# Patient Record
Sex: MALE | Race: WHITE | NOT HISPANIC OR LATINO | Employment: OTHER
[De-identification: names, ages, dates, MRNs, and addresses within clinical notes are randomized per-mention and may not be internally consistent; named-entity substitution may affect disease eponyms.]

---

## 2017-01-04 ENCOUNTER — CHARTING TRANS (OUTPATIENT)
Dept: OTHER | Age: 62
End: 2017-01-04

## 2017-03-01 ENCOUNTER — CHARTING TRANS (OUTPATIENT)
Dept: OTHER | Age: 62
End: 2017-03-01

## 2017-03-06 ENCOUNTER — CHARTING TRANS (OUTPATIENT)
Dept: OTHER | Age: 62
End: 2017-03-06

## 2017-03-07 ENCOUNTER — LAB SERVICES (OUTPATIENT)
Dept: OTHER | Age: 62
End: 2017-03-07

## 2017-03-07 ENCOUNTER — CHARTING TRANS (OUTPATIENT)
Dept: OTHER | Age: 62
End: 2017-03-07

## 2017-03-07 ENCOUNTER — CHARTING TRANS (OUTPATIENT)
Dept: INTERNAL MEDICINE | Age: 62
End: 2017-03-07

## 2017-03-07 LAB
ALBUMIN SERPL BCG-MCNC: 4 G/DL (ref 3.6–5.1)
ALBUMIN/CREAT UR: 5.6 MG/G (ref 0–30)
ALP SERPL-CCNC: 88 U/L (ref 30–130)
ALT SERPL W/O P-5'-P-CCNC: 33 U/L (ref 17–47)
AST SERPL-CCNC: 17 U/L (ref 14–43)
BASOPHIL %: 0.8 % (ref 0–1.2)
BASOPHIL ABSOLUTE #: 0.1 10*3/UL (ref 0–0.1)
BILIRUB SERPL-MCNC: 0.5 MG/DL (ref 0–1.3)
BUN SERPL-MCNC: 20 MG/DL (ref 6–27)
CALCIUM SERPL-MCNC: 9.6 MG/DL (ref 8.6–10.6)
CHLORIDE SERPL-SCNC: 100 MMOL/L (ref 96–107)
CHOLEST SERPL-MCNC: 165 MG/DL (ref 140–200)
CREAT UR-MCNC: 111 MG/DL
CREATININE, SERUM: 0.8 MG/DL (ref 0.6–1.6)
DIFFERENTIAL TYPE: NORMAL
EOSINOPHIL %: 4.8 % (ref 0–10)
EOSINOPHIL ABSOLUTE #: 0.4 10*3/UL (ref 0–0.5)
EST. AVERAGE GLUCOSE BLD GHB EST-MCNC: 171 MG/DL (ref 0–154)
GFR SERPL CREATININE-BSD FRML MDRD: >60 ML/MIN/{1.73M2}
GFR SERPL CREATININE-BSD FRML MDRD: >60 ML/MIN/{1.73M2}
GLUCOSE P FAST SERPL-MCNC: 240 MG/DL (ref 60–100)
HBA1C MFR BLD: 7.6 % (ref 4.2–6)
HCO3 SERPL-SCNC: 27 MMOL/L (ref 22–32)
HDLC SERPL-MCNC: 63 MG/DL
HEMATOCRIT: 40.9 % (ref 40–51)
HEMOGLOBIN: 13.9 G/DL (ref 13.7–17.5)
LDLC SERPL CALC-MCNC: 87 MG/DL (ref 30–100)
LYMPH PERCENT: 31.3 % (ref 20.5–51.1)
LYMPHOCYTE ABSOLUTE #: 2.3 10*3/UL (ref 1.2–3.4)
MEAN CORPUSCULAR HGB CONCENTRATION: 34 % (ref 32–36)
MEAN CORPUSCULAR HGB: 31.2 PG (ref 27–34)
MEAN CORPUSCULAR VOLUME: 91.9 FL (ref 79–95)
MEAN PLATELET VOLUME: 10.1 FL (ref 8.6–12.4)
MICROALBUMIN UR-MCNC: 6.2 MG/L
MONOCYTE ABSOLUTE #: 0.8 10*3/UL (ref 0.2–0.9)
MONOCYTE PERCENT: 11 % (ref 4.3–12.9)
NEUTROPHIL ABSOLUTE #: 3.8 10*3/UL (ref 1.4–6.5)
NEUTROPHIL PERCENT: 52.1 % (ref 34–73.5)
PLATELET COUNT: 163 10*3/UL (ref 150–400)
POTASSIUM SERPL-SCNC: 4.6 MMOL/L (ref 3.5–5.3)
PROT SERPL-MCNC: 7 G/DL (ref 6.4–8.5)
PSA SERPL-MCNC: 0.48 NG/ML (ref 0–4.1)
RED BLOOD CELL COUNT: 4.45 10*6/UL (ref 3.9–5.7)
RED CELL DISTRIBUTION WIDTH: 11.9 % (ref 11.3–14.8)
SODIUM SERPL-SCNC: 138 MMOL/L (ref 136–146)
TRIGL SERPL-MCNC: 75 MG/DL (ref 0–200)
TSH SERPL-ACNC: 5.32 M[IU]/L (ref 0.3–4.82)
WHITE BLOOD CELL COUNT: 7.4 10*3/UL (ref 4–10)

## 2017-04-01 ENCOUNTER — CHARTING TRANS (OUTPATIENT)
Dept: OTHER | Age: 62
End: 2017-04-01

## 2017-05-03 ENCOUNTER — CHARTING TRANS (OUTPATIENT)
Dept: OTHER | Age: 62
End: 2017-05-03

## 2017-06-02 ENCOUNTER — CHARTING TRANS (OUTPATIENT)
Dept: OTHER | Age: 62
End: 2017-06-02

## 2017-06-15 ENCOUNTER — LAB SERVICES (OUTPATIENT)
Dept: OTHER | Age: 62
End: 2017-06-15

## 2017-06-15 LAB — TSH SERPL-ACNC: 8.32 M[IU]/L (ref 0.3–4.82)

## 2017-07-13 ENCOUNTER — CHARTING TRANS (OUTPATIENT)
Dept: OTHER | Age: 62
End: 2017-07-13

## 2017-09-15 ENCOUNTER — CHARTING TRANS (OUTPATIENT)
Dept: OTHER | Age: 62
End: 2017-09-15

## 2017-10-05 ENCOUNTER — CHARTING TRANS (OUTPATIENT)
Dept: OTHER | Age: 62
End: 2017-10-05

## 2017-10-13 ENCOUNTER — LAB SERVICES (OUTPATIENT)
Dept: OTHER | Age: 62
End: 2017-10-13

## 2017-10-13 LAB
BUN SERPL-MCNC: 20 MG/DL (ref 6–27)
CALCIUM SERPL-MCNC: 9.6 MG/DL (ref 8.6–10.6)
CHLORIDE SERPL-SCNC: 104 MMOL/L (ref 96–107)
CREATININE, SERUM: 1 MG/DL (ref 0.6–1.6)
EST. AVERAGE GLUCOSE BLD GHB EST-MCNC: 147 MG/DL (ref 0–154)
GFR SERPL CREATININE-BSD FRML MDRD: >60 ML/MIN/{1.73M2}
GFR SERPL CREATININE-BSD FRML MDRD: >60 ML/MIN/{1.73M2}
GLUCOSE SERPL-MCNC: 104 MG/DL (ref 70–200)
HBA1C MFR BLD: 6.7 % (ref 4.2–6)
HCO3 SERPL-SCNC: 27 MMOL/L (ref 22–32)
POTASSIUM SERPL-SCNC: 4.4 MMOL/L (ref 3.5–5.3)
SODIUM SERPL-SCNC: 142 MMOL/L (ref 136–146)
TSH SERPL-ACNC: 0.25 M[IU]/L (ref 0.3–4.82)

## 2017-10-20 ENCOUNTER — CHARTING TRANS (OUTPATIENT)
Dept: OTHER | Age: 62
End: 2017-10-20

## 2017-10-23 ENCOUNTER — CHARTING TRANS (OUTPATIENT)
Dept: OTHER | Age: 62
End: 2017-10-23

## 2017-12-06 ENCOUNTER — CHARTING TRANS (OUTPATIENT)
Dept: OTHER | Age: 62
End: 2017-12-06

## 2017-12-28 ENCOUNTER — LAB SERVICES (OUTPATIENT)
Dept: OTHER | Age: 62
End: 2017-12-28

## 2017-12-28 ENCOUNTER — IMAGING SERVICES (OUTPATIENT)
Dept: OTHER | Age: 62
End: 2017-12-28

## 2017-12-28 ENCOUNTER — CHARTING TRANS (OUTPATIENT)
Dept: OTHER | Age: 62
End: 2017-12-28

## 2017-12-28 LAB — TSH SERPL DL<=0.05 MIU/L-ACNC: 0.65 M[IU]/L (ref 0.3–4.82)

## 2018-01-02 ENCOUNTER — CHARTING TRANS (OUTPATIENT)
Dept: OTHER | Age: 63
End: 2018-01-02

## 2018-01-09 ENCOUNTER — CHARTING TRANS (OUTPATIENT)
Dept: OTHER | Age: 63
End: 2018-01-09

## 2018-01-10 ENCOUNTER — CHARTING TRANS (OUTPATIENT)
Dept: OTHER | Age: 63
End: 2018-01-10

## 2018-01-22 ENCOUNTER — CHARTING TRANS (OUTPATIENT)
Dept: OTHER | Age: 63
End: 2018-01-22

## 2018-02-15 ENCOUNTER — CHARTING TRANS (OUTPATIENT)
Dept: OTHER | Age: 63
End: 2018-02-15

## 2018-02-28 ENCOUNTER — CHARTING TRANS (OUTPATIENT)
Dept: OTHER | Age: 63
End: 2018-02-28

## 2018-03-01 ENCOUNTER — LAB SERVICES (OUTPATIENT)
Dept: OTHER | Age: 63
End: 2018-03-01

## 2018-03-01 ENCOUNTER — CHARTING TRANS (OUTPATIENT)
Dept: OTHER | Age: 63
End: 2018-03-01

## 2018-03-01 LAB
ALBUMIN/CREAT UR: 4.5 MG/G (ref 0–30)
BUN SERPL-MCNC: 22 MG/DL (ref 6–27)
CALCIUM SERPL-MCNC: 9.2 MG/DL (ref 8.6–10.6)
CHLORIDE SERPL-SCNC: 107 MMOL/L (ref 96–107)
CHOLEST SERPL-MCNC: 135 MG/DL (ref 140–200)
CREAT UR-MCNC: 201.2 MG/DL
CREATININE, SERUM: 0.8 MG/DL (ref 0.6–1.6)
EST. AVERAGE GLUCOSE BLD GHB EST-MCNC: 181 MG/DL (ref 0–154)
GFR SERPL CREATININE-BSD FRML MDRD: >60 ML/MIN/{1.73M2}
GFR SERPL CREATININE-BSD FRML MDRD: >60 ML/MIN/{1.73M2}
GLUCOSE P FAST SERPL-MCNC: 205 MG/DL (ref 60–100)
HBA1C MFR BLD: 7.9 % (ref 4.2–6)
HCO3 SERPL-SCNC: 26 MMOL/L (ref 22–32)
HDLC SERPL-MCNC: 52 MG/DL
LDLC SERPL CALC-MCNC: 70 MG/DL (ref 30–100)
MICROALBUMIN UR-MCNC: 9.1 MG/L
POTASSIUM SERPL-SCNC: 4.3 MMOL/L (ref 3.5–5.3)
PSA SERPL-MCNC: 0.43 NG/ML (ref 0–4.4)
SODIUM SERPL-SCNC: 142 MMOL/L (ref 136–146)
TRIGL SERPL-MCNC: 63 MG/DL (ref 0–200)
TSH SERPL-ACNC: 1 M[IU]/L (ref 0.3–4.82)

## 2018-03-02 ENCOUNTER — CHARTING TRANS (OUTPATIENT)
Dept: OTHER | Age: 63
End: 2018-03-02

## 2018-03-04 ENCOUNTER — CHARTING TRANS (OUTPATIENT)
Dept: OTHER | Age: 63
End: 2018-03-04

## 2018-03-06 ENCOUNTER — CHARTING TRANS (OUTPATIENT)
Dept: OTHER | Age: 63
End: 2018-03-06

## 2018-04-17 ENCOUNTER — CHARTING TRANS (OUTPATIENT)
Dept: OTHER | Age: 63
End: 2018-04-17

## 2018-04-17 ENCOUNTER — IMAGING SERVICES (OUTPATIENT)
Dept: OTHER | Age: 63
End: 2018-04-17

## 2018-05-30 ENCOUNTER — CHARTING TRANS (OUTPATIENT)
Dept: OTHER | Age: 63
End: 2018-05-30

## 2018-06-05 ENCOUNTER — CHARTING TRANS (OUTPATIENT)
Dept: OTHER | Age: 63
End: 2018-06-05

## 2018-06-08 ENCOUNTER — CHARTING TRANS (OUTPATIENT)
Dept: OTHER | Age: 63
End: 2018-06-08

## 2018-07-25 ENCOUNTER — CHARTING TRANS (OUTPATIENT)
Dept: OTHER | Age: 63
End: 2018-07-25

## 2018-08-30 ENCOUNTER — CHARTING TRANS (OUTPATIENT)
Dept: OTHER | Age: 63
End: 2018-08-30

## 2018-08-30 ENCOUNTER — LAB SERVICES (OUTPATIENT)
Dept: OTHER | Age: 63
End: 2018-08-30

## 2018-08-30 LAB
EST. AVERAGE GLUCOSE BLD GHB EST-MCNC: 176 MG/DL (ref 0–154)
HBA1C BLD-MCNC: 7.8 % (ref 4.2–6)

## 2018-09-04 ENCOUNTER — CHARTING TRANS (OUTPATIENT)
Dept: OTHER | Age: 63
End: 2018-09-04

## 2018-09-05 ENCOUNTER — CHARTING TRANS (OUTPATIENT)
Dept: OTHER | Age: 63
End: 2018-09-05

## 2018-09-10 ENCOUNTER — CHARTING TRANS (OUTPATIENT)
Dept: OTHER | Age: 63
End: 2018-09-10

## 2018-09-17 ENCOUNTER — CHARTING TRANS (OUTPATIENT)
Dept: OTHER | Age: 63
End: 2018-09-17

## 2018-09-18 ENCOUNTER — CHARTING TRANS (OUTPATIENT)
Dept: OTHER | Age: 63
End: 2018-09-18

## 2018-09-21 ENCOUNTER — CHARTING TRANS (OUTPATIENT)
Dept: OTHER | Age: 63
End: 2018-09-21

## 2018-09-27 ENCOUNTER — CHARTING TRANS (OUTPATIENT)
Dept: OTHER | Age: 63
End: 2018-09-27

## 2018-09-28 ENCOUNTER — CHARTING TRANS (OUTPATIENT)
Dept: OTHER | Age: 63
End: 2018-09-28

## 2018-10-02 ENCOUNTER — CHARTING TRANS (OUTPATIENT)
Dept: OTHER | Age: 63
End: 2018-10-02

## 2018-10-09 ENCOUNTER — CHARTING TRANS (OUTPATIENT)
Dept: OTHER | Age: 63
End: 2018-10-09

## 2018-10-15 ENCOUNTER — CHARTING TRANS (OUTPATIENT)
Dept: OTHER | Age: 63
End: 2018-10-15

## 2018-10-16 ENCOUNTER — CHARTING TRANS (OUTPATIENT)
Dept: OTHER | Age: 63
End: 2018-10-16

## 2018-10-25 ENCOUNTER — CHARTING TRANS (OUTPATIENT)
Dept: OTHER | Age: 63
End: 2018-10-25

## 2018-11-06 ENCOUNTER — CHARTING TRANS (OUTPATIENT)
Dept: OTHER | Age: 63
End: 2018-11-06

## 2018-11-09 ENCOUNTER — CHARTING TRANS (OUTPATIENT)
Dept: OTHER | Age: 63
End: 2018-11-09

## 2018-11-12 ENCOUNTER — CHARTING TRANS (OUTPATIENT)
Dept: OTHER | Age: 63
End: 2018-11-12

## 2018-11-23 ENCOUNTER — IMAGING SERVICES (OUTPATIENT)
Dept: OTHER | Age: 63
End: 2018-11-23

## 2018-11-24 ENCOUNTER — LAB SERVICES (OUTPATIENT)
Dept: OTHER | Age: 63
End: 2018-11-24

## 2018-11-25 ENCOUNTER — CHARTING TRANS (OUTPATIENT)
Dept: OTHER | Age: 63
End: 2018-11-25

## 2018-11-25 ENCOUNTER — LAB SERVICES (OUTPATIENT)
Dept: OTHER | Age: 63
End: 2018-11-25

## 2018-11-25 LAB — MRSA SCREEN PCR: NORMAL

## 2018-11-27 ENCOUNTER — CHARTING TRANS (OUTPATIENT)
Dept: OTHER | Age: 63
End: 2018-11-27

## 2018-11-27 ENCOUNTER — BH HISTORICAL (OUTPATIENT)
Dept: OTHER | Age: 63
End: 2018-11-27

## 2018-11-28 ENCOUNTER — CHARTING TRANS (OUTPATIENT)
Dept: OTHER | Age: 63
End: 2018-11-28

## 2018-11-28 VITALS
HEART RATE: 92 BPM | SYSTOLIC BLOOD PRESSURE: 114 MMHG | DIASTOLIC BLOOD PRESSURE: 64 MMHG | SYSTOLIC BLOOD PRESSURE: 114 MMHG | DIASTOLIC BLOOD PRESSURE: 56 MMHG | WEIGHT: 175 LBS | WEIGHT: 174 LBS | HEART RATE: 80 BPM

## 2018-11-28 VITALS
WEIGHT: 175 LBS | OXYGEN SATURATION: 96 % | DIASTOLIC BLOOD PRESSURE: 56 MMHG | SYSTOLIC BLOOD PRESSURE: 108 MMHG | HEART RATE: 84 BPM

## 2018-11-30 LAB
CULTURE BLOOD: NORMAL
CULTURE BLOOD: NORMAL

## 2018-12-03 ENCOUNTER — TELEPHONE (OUTPATIENT)
Dept: ENDOCRINOLOGY | Age: 63
End: 2018-12-03

## 2018-12-04 ENCOUNTER — TELEPHONE (OUTPATIENT)
Dept: ENDOCRINOLOGY | Age: 63
End: 2018-12-04

## 2018-12-12 ENCOUNTER — OFFICE VISIT (OUTPATIENT)
Dept: INTERNAL MEDICINE | Age: 63
End: 2018-12-12

## 2018-12-12 VITALS
HEIGHT: 72 IN | DIASTOLIC BLOOD PRESSURE: 62 MMHG | HEART RATE: 84 BPM | WEIGHT: 170.2 LBS | SYSTOLIC BLOOD PRESSURE: 104 MMHG | RESPIRATION RATE: 16 BRPM | TEMPERATURE: 98.6 F | BODY MASS INDEX: 23.05 KG/M2

## 2018-12-12 DIAGNOSIS — E10.42 DIABETIC POLYNEUROPATHY ASSOCIATED WITH TYPE 1 DIABETES MELLITUS (CMD): ICD-10-CM

## 2018-12-12 DIAGNOSIS — E10.649 HYPOGLYCEMIA UNAWARENESS IN TYPE 1 DIABETES MELLITUS (CMD): Primary | ICD-10-CM

## 2018-12-12 DIAGNOSIS — E03.9 ACQUIRED HYPOTHYROIDISM: ICD-10-CM

## 2018-12-12 PROBLEM — S98.132A AMPUTATED TOE OF LEFT FOOT (CMD): Status: ACTIVE | Noted: 2018-05-16

## 2018-12-12 PROCEDURE — 3078F DIAST BP <80 MM HG: CPT | Performed by: INTERNAL MEDICINE

## 2018-12-12 PROCEDURE — 3074F SYST BP LT 130 MM HG: CPT | Performed by: INTERNAL MEDICINE

## 2018-12-12 PROCEDURE — 99215 OFFICE O/P EST HI 40 MIN: CPT | Performed by: INTERNAL MEDICINE

## 2018-12-12 RX ORDER — PROCHLORPERAZINE 25 MG/1
SUPPOSITORY RECTAL
COMMUNITY
Start: 2018-08-30 | End: 2020-08-17 | Stop reason: SDUPTHER

## 2018-12-12 RX ORDER — PEN NEEDLE, DIABETIC 30 GX3/16"
NEEDLE, DISPOSABLE MISCELLANEOUS
COMMUNITY
Start: 2018-08-30 | End: 2019-01-22 | Stop reason: SDUPTHER

## 2018-12-12 RX ORDER — ASPIRIN 81 MG/1
81 TABLET, CHEWABLE ORAL DAILY
COMMUNITY
Start: 2008-06-24

## 2018-12-12 RX ORDER — GLUCOSAMINE HCL/CHONDROITIN SU 500-400 MG
CAPSULE ORAL
COMMUNITY
Start: 2018-06-05 | End: 2019-06-05

## 2018-12-12 RX ORDER — CALCIUM CARB/VITAMIN D3/VIT K1 500-100-40
TABLET,CHEWABLE ORAL
COMMUNITY
Start: 2018-09-10 | End: 2019-11-01 | Stop reason: ALTCHOICE

## 2018-12-12 RX ORDER — LEVOTHYROXINE SODIUM 137 UG/1
137 TABLET ORAL
COMMUNITY
Start: 2018-11-25 | End: 2019-06-19 | Stop reason: DRUGHIGH

## 2018-12-12 RX ORDER — PROCHLORPERAZINE 25 MG/1
SUPPOSITORY RECTAL
COMMUNITY
Start: 2018-08-30

## 2018-12-12 SDOH — HEALTH STABILITY: MENTAL HEALTH: HOW OFTEN DO YOU HAVE A DRINK CONTAINING ALCOHOL?: NEVER

## 2018-12-13 ENCOUNTER — TELEPHONE (OUTPATIENT)
Dept: SCHEDULING | Age: 63
End: 2018-12-13

## 2018-12-13 ENCOUNTER — TELEPHONE (OUTPATIENT)
Dept: CARDIOLOGY | Age: 63
End: 2018-12-13

## 2018-12-13 ASSESSMENT — ENCOUNTER SYMPTOMS
GASTROINTESTINAL NEGATIVE: 1
ENDOCRINE NEGATIVE: 1
ALLERGIC/IMMUNOLOGIC NEGATIVE: 1
CONSTITUTIONAL NEGATIVE: 1
HEMATOLOGIC/LYMPHATIC NEGATIVE: 1
NEUROLOGICAL NEGATIVE: 1
RESPIRATORY NEGATIVE: 1
PSYCHIATRIC NEGATIVE: 1
EYES NEGATIVE: 1

## 2018-12-14 ENCOUNTER — APPOINTMENT (OUTPATIENT)
Dept: CARDIOLOGY | Age: 63
End: 2018-12-14

## 2018-12-14 ENCOUNTER — OFFICE VISIT (OUTPATIENT)
Dept: CARDIOLOGY | Age: 63
End: 2018-12-14

## 2018-12-14 VITALS
DIASTOLIC BLOOD PRESSURE: 56 MMHG | WEIGHT: 169 LBS | OXYGEN SATURATION: 99 % | BODY MASS INDEX: 22.89 KG/M2 | SYSTOLIC BLOOD PRESSURE: 102 MMHG | RESPIRATION RATE: 18 BRPM | HEIGHT: 72 IN | HEART RATE: 83 BPM

## 2018-12-14 DIAGNOSIS — Z72.0 TOBACCO ABUSE: ICD-10-CM

## 2018-12-14 DIAGNOSIS — I73.9 PERIPHERAL VASCULAR DISEASE (CMD): ICD-10-CM

## 2018-12-14 DIAGNOSIS — R79.89 ELEVATED TROPONIN: Primary | ICD-10-CM

## 2018-12-14 DIAGNOSIS — E10.59 TYPE 1 DIABETES MELLITUS WITH OTHER CIRCULATORY COMPLICATION (CMD): ICD-10-CM

## 2018-12-14 PROCEDURE — 99214 OFFICE O/P EST MOD 30 MIN: CPT | Performed by: INTERNAL MEDICINE

## 2018-12-14 PROCEDURE — 3074F SYST BP LT 130 MM HG: CPT | Performed by: INTERNAL MEDICINE

## 2018-12-14 PROCEDURE — 3078F DIAST BP <80 MM HG: CPT | Performed by: INTERNAL MEDICINE

## 2018-12-14 SDOH — HEALTH STABILITY: MENTAL HEALTH: HOW OFTEN DO YOU HAVE A DRINK CONTAINING ALCOHOL?: NEVER

## 2018-12-21 ENCOUNTER — DOCUMENTATION (OUTPATIENT)
Dept: INTERNAL MEDICINE | Age: 63
End: 2018-12-21

## 2019-01-07 ENCOUNTER — OFFICE VISIT (OUTPATIENT)
Dept: ENDOCRINOLOGY | Age: 64
End: 2019-01-07

## 2019-01-07 VITALS
BODY MASS INDEX: 23.16 KG/M2 | SYSTOLIC BLOOD PRESSURE: 112 MMHG | DIASTOLIC BLOOD PRESSURE: 70 MMHG | HEIGHT: 72 IN | HEART RATE: 76 BPM | WEIGHT: 171 LBS

## 2019-01-07 DIAGNOSIS — E03.9 ACQUIRED HYPOTHYROIDISM: ICD-10-CM

## 2019-01-07 DIAGNOSIS — E10.65 UNCONTROLLED TYPE 1 DIABETES MELLITUS WITH HYPERGLYCEMIA (CMD): Primary | ICD-10-CM

## 2019-01-07 DIAGNOSIS — E10.649 HYPOGLYCEMIA UNAWARENESS IN TYPE 1 DIABETES MELLITUS (CMD): ICD-10-CM

## 2019-01-07 PROCEDURE — 3078F DIAST BP <80 MM HG: CPT | Performed by: INTERNAL MEDICINE

## 2019-01-07 PROCEDURE — 3074F SYST BP LT 130 MM HG: CPT | Performed by: INTERNAL MEDICINE

## 2019-01-07 PROCEDURE — 99215 OFFICE O/P EST HI 40 MIN: CPT | Performed by: INTERNAL MEDICINE

## 2019-01-07 SDOH — HEALTH STABILITY: MENTAL HEALTH: HOW OFTEN DO YOU HAVE A DRINK CONTAINING ALCOHOL?: NEVER

## 2019-01-10 ENCOUNTER — LAB SERVICES (OUTPATIENT)
Dept: LAB | Age: 64
End: 2019-01-10

## 2019-01-10 DIAGNOSIS — E10.65 UNCONTROLLED TYPE 1 DIABETES MELLITUS WITH HYPERGLYCEMIA (CMD): ICD-10-CM

## 2019-01-10 LAB
EST. AVERAGE GLUCOSE BLD GHB EST-MCNC: 170 MG/DL (ref 0–154)
HBA1C BLD-MCNC: 7.6 % (ref 4.2–6)

## 2019-01-10 PROCEDURE — 83036 HEMOGLOBIN GLYCOSYLATED A1C: CPT | Performed by: INTERNAL MEDICINE

## 2019-01-10 PROCEDURE — 36415 COLL VENOUS BLD VENIPUNCTURE: CPT | Performed by: INTERNAL MEDICINE

## 2019-01-11 ENCOUNTER — TELEPHONE (OUTPATIENT)
Dept: ENDOCRINOLOGY | Age: 64
End: 2019-01-11

## 2019-01-11 ENCOUNTER — TELEPHONE (OUTPATIENT)
Dept: CARDIOLOGY | Age: 64
End: 2019-01-11

## 2019-01-22 ENCOUNTER — MED INFO FORMS (OUTPATIENT)
Dept: OTHER | Age: 64
End: 2019-01-22

## 2019-01-22 RX ORDER — PEN NEEDLE, DIABETIC 32GX 5/32"
NEEDLE, DISPOSABLE MISCELLANEOUS
Qty: 450 EACH | Refills: 3 | Status: SHIPPED | OUTPATIENT
Start: 2019-01-22 | End: 2019-11-01 | Stop reason: SDUPTHER

## 2019-01-22 RX ORDER — INSULIN GLARGINE 100 [IU]/ML
8 INJECTION, SOLUTION SUBCUTANEOUS NIGHTLY
Qty: 15 ML | Refills: 2 | Status: SHIPPED | OUTPATIENT
Start: 2019-01-22 | End: 2019-01-28 | Stop reason: SDUPTHER

## 2019-01-24 ENCOUNTER — IMAGING SERVICES (OUTPATIENT)
Dept: OTHER | Age: 64
End: 2019-01-24

## 2019-01-28 ENCOUNTER — TELEPHONE (OUTPATIENT)
Dept: ENDOCRINOLOGY | Age: 64
End: 2019-01-28

## 2019-01-28 RX ORDER — INSULIN GLARGINE 100 [IU]/ML
10 INJECTION, SOLUTION SUBCUTANEOUS NIGHTLY
Qty: 15 ML | Refills: 2 | Status: SHIPPED | OUTPATIENT
Start: 2019-01-28 | End: 2019-07-30 | Stop reason: DRUGHIGH

## 2019-01-29 ENCOUNTER — TELEPHONE (OUTPATIENT)
Dept: ENDOCRINOLOGY | Age: 64
End: 2019-01-29

## 2019-02-01 RX ORDER — BLOOD-GLUCOSE METER
EACH MISCELLANEOUS
COMMUNITY
End: 2020-10-13 | Stop reason: ALTCHOICE

## 2019-02-01 RX ORDER — LANCETS
EACH MISCELLANEOUS
COMMUNITY
End: 2019-11-08 | Stop reason: SDUPTHER

## 2019-02-04 ENCOUNTER — OFFICE VISIT (OUTPATIENT)
Dept: PODIATRY | Age: 64
End: 2019-02-04

## 2019-02-04 VITALS — BODY MASS INDEX: 22.35 KG/M2 | WEIGHT: 165 LBS | HEIGHT: 72 IN

## 2019-02-04 DIAGNOSIS — M20.5X9 HL (HALLUX LIMITUS), UNSPECIFIED LATERALITY: ICD-10-CM

## 2019-02-04 DIAGNOSIS — L97.512 SKIN ULCER OF RIGHT FOOT WITH FAT LAYER EXPOSED (CMD): ICD-10-CM

## 2019-02-04 DIAGNOSIS — S98.132A AMPUTATED TOE OF LEFT FOOT (CMD): ICD-10-CM

## 2019-02-04 DIAGNOSIS — E11.42 TYPE 2 DIABETES MELLITUS WITH DIABETIC POLYNEUROPATHY, UNSPECIFIED WHETHER LONG TERM INSULIN USE (CMD): Primary | ICD-10-CM

## 2019-02-04 PROCEDURE — 3074F SYST BP LT 130 MM HG: CPT | Performed by: PODIATRIST

## 2019-02-04 PROCEDURE — 3078F DIAST BP <80 MM HG: CPT | Performed by: PODIATRIST

## 2019-02-04 PROCEDURE — 99213 OFFICE O/P EST LOW 20 MIN: CPT | Performed by: PODIATRIST

## 2019-02-04 PROCEDURE — 11042 DBRDMT SUBQ TIS 1ST 20SQCM/<: CPT | Performed by: PODIATRIST

## 2019-02-26 ENCOUNTER — OFFICE VISIT (OUTPATIENT)
Dept: PODIATRY | Age: 64
End: 2019-02-26

## 2019-02-26 DIAGNOSIS — E10.42 DIABETIC POLYNEUROPATHY ASSOCIATED WITH TYPE 1 DIABETES MELLITUS (CMD): Primary | ICD-10-CM

## 2019-02-26 DIAGNOSIS — M20.5X9 HL (HALLUX LIMITUS), UNSPECIFIED LATERALITY: ICD-10-CM

## 2019-02-26 DIAGNOSIS — S98.132A AMPUTATED TOE OF LEFT FOOT (CMD): ICD-10-CM

## 2019-02-26 PROCEDURE — 3078F DIAST BP <80 MM HG: CPT | Performed by: PODIATRIST

## 2019-02-26 PROCEDURE — 99213 OFFICE O/P EST LOW 20 MIN: CPT | Performed by: PODIATRIST

## 2019-02-26 PROCEDURE — 3074F SYST BP LT 130 MM HG: CPT | Performed by: PODIATRIST

## 2019-02-26 SDOH — HEALTH STABILITY: MENTAL HEALTH: HOW OFTEN DO YOU HAVE A DRINK CONTAINING ALCOHOL?: NEVER

## 2019-02-28 ENCOUNTER — ANCILLARY PROCEDURE (OUTPATIENT)
Dept: CARDIOLOGY | Age: 64
End: 2019-02-28
Attending: INTERNAL MEDICINE

## 2019-02-28 DIAGNOSIS — I73.9 PVD (PERIPHERAL VASCULAR DISEASE) (CMD): ICD-10-CM

## 2019-03-05 VITALS
WEIGHT: 175 LBS | HEART RATE: 81 BPM | SYSTOLIC BLOOD PRESSURE: 104 MMHG | BODY MASS INDEX: 23.7 KG/M2 | DIASTOLIC BLOOD PRESSURE: 62 MMHG | HEIGHT: 72 IN

## 2019-03-05 VITALS
BODY MASS INDEX: 23.7 KG/M2 | DIASTOLIC BLOOD PRESSURE: 68 MMHG | HEIGHT: 72 IN | SYSTOLIC BLOOD PRESSURE: 110 MMHG | WEIGHT: 175 LBS | HEART RATE: 76 BPM

## 2019-04-01 ENCOUNTER — OFFICE VISIT (OUTPATIENT)
Dept: PSYCHOLOGY | Age: 64
End: 2019-04-01

## 2019-04-01 DIAGNOSIS — Z01.89 ENCOUNTER FOR NEUROPSYCHOLOGICAL TESTING: Primary | ICD-10-CM

## 2019-04-01 PROCEDURE — X1094 NO CHARGE VISIT: HCPCS | Performed by: PSYCHOLOGIST

## 2019-04-12 ENCOUNTER — TELEPHONE (OUTPATIENT)
Dept: BEHAVIORAL HEALTH | Age: 64
End: 2019-04-12

## 2019-04-16 ENCOUNTER — TELEPHONE (OUTPATIENT)
Dept: ENDOCRINOLOGY | Age: 64
End: 2019-04-16

## 2019-04-30 ENCOUNTER — TELEPHONE (OUTPATIENT)
Dept: PSYCHOLOGY | Age: 64
End: 2019-04-30

## 2019-04-30 ENCOUNTER — OFFICE VISIT (OUTPATIENT)
Dept: ENDOCRINOLOGY | Age: 64
End: 2019-04-30

## 2019-04-30 ENCOUNTER — OFFICE VISIT (OUTPATIENT)
Dept: PSYCHOLOGY | Age: 64
End: 2019-04-30

## 2019-04-30 VITALS
SYSTOLIC BLOOD PRESSURE: 110 MMHG | HEART RATE: 80 BPM | DIASTOLIC BLOOD PRESSURE: 60 MMHG | WEIGHT: 167.8 LBS | BODY MASS INDEX: 22.76 KG/M2

## 2019-04-30 DIAGNOSIS — R41.844 EXECUTIVE FUNCTION DEFICIT: ICD-10-CM

## 2019-04-30 DIAGNOSIS — E10.649 HYPOGLYCEMIA UNAWARENESS IN TYPE 1 DIABETES MELLITUS (CMD): ICD-10-CM

## 2019-04-30 DIAGNOSIS — E03.9 ACQUIRED HYPOTHYROIDISM: ICD-10-CM

## 2019-04-30 DIAGNOSIS — E10.65 UNCONTROLLED TYPE 1 DIABETES MELLITUS WITH HYPERGLYCEMIA (CMD): Primary | ICD-10-CM

## 2019-04-30 DIAGNOSIS — R41.840 ATTENTION AND CONCENTRATION DEFICIT: ICD-10-CM

## 2019-04-30 DIAGNOSIS — R41.3 MEMORY LOSS: Primary | ICD-10-CM

## 2019-04-30 PROCEDURE — 90791 PSYCH DIAGNOSTIC EVALUATION: CPT | Performed by: PSYCHOLOGIST

## 2019-04-30 PROCEDURE — 99215 OFFICE O/P EST HI 40 MIN: CPT | Performed by: INTERNAL MEDICINE

## 2019-04-30 PROCEDURE — 96136 PSYCL/NRPSYC TST PHY/QHP 1ST: CPT | Performed by: PSYCHOLOGIST

## 2019-04-30 PROCEDURE — 3078F DIAST BP <80 MM HG: CPT | Performed by: INTERNAL MEDICINE

## 2019-04-30 PROCEDURE — 3074F SYST BP LT 130 MM HG: CPT | Performed by: INTERNAL MEDICINE

## 2019-04-30 PROCEDURE — 96137 PSYCL/NRPSYC TST PHY/QHP EA: CPT | Performed by: PSYCHOLOGIST

## 2019-04-30 PROCEDURE — 96132 NRPSYC TST EVAL PHYS/QHP 1ST: CPT | Performed by: PSYCHOLOGIST

## 2019-04-30 PROCEDURE — 96133 NRPSYC TST EVAL PHYS/QHP EA: CPT | Performed by: PSYCHOLOGIST

## 2019-04-30 PROCEDURE — 95251 CONT GLUC MNTR ANALYSIS I&R: CPT | Performed by: INTERNAL MEDICINE

## 2019-05-08 ENCOUNTER — TELEPHONE (OUTPATIENT)
Dept: BEHAVIORAL HEALTH | Age: 64
End: 2019-05-08

## 2019-05-28 ENCOUNTER — OFFICE VISIT (OUTPATIENT)
Dept: PODIATRY | Age: 64
End: 2019-05-28

## 2019-05-28 DIAGNOSIS — M20.40 HAMMER TOE, UNSPECIFIED LATERALITY: ICD-10-CM

## 2019-05-28 DIAGNOSIS — L97.512 SKIN ULCER OF RIGHT FOOT WITH FAT LAYER EXPOSED (CMD): ICD-10-CM

## 2019-05-28 DIAGNOSIS — L60.0 INGROWN NAIL: ICD-10-CM

## 2019-05-28 DIAGNOSIS — E10.42 DIABETIC POLYNEUROPATHY ASSOCIATED WITH TYPE 1 DIABETES MELLITUS (CMD): ICD-10-CM

## 2019-05-28 DIAGNOSIS — M20.5X9 HL (HALLUX LIMITUS), UNSPECIFIED LATERALITY: Primary | ICD-10-CM

## 2019-05-28 PROCEDURE — 11721 DEBRIDE NAIL 6 OR MORE: CPT | Performed by: PODIATRIST

## 2019-05-28 PROCEDURE — 11042 DBRDMT SUBQ TIS 1ST 20SQCM/<: CPT | Performed by: PODIATRIST

## 2019-06-13 ENCOUNTER — OFFICE VISIT (OUTPATIENT)
Dept: INTERNAL MEDICINE | Age: 64
End: 2019-06-13

## 2019-06-13 ENCOUNTER — LAB SERVICES (OUTPATIENT)
Dept: LAB | Age: 64
End: 2019-06-13

## 2019-06-13 VITALS
TEMPERATURE: 98.2 F | HEART RATE: 76 BPM | DIASTOLIC BLOOD PRESSURE: 60 MMHG | WEIGHT: 167 LBS | BODY MASS INDEX: 22.62 KG/M2 | SYSTOLIC BLOOD PRESSURE: 118 MMHG | HEIGHT: 72 IN | OXYGEN SATURATION: 98 %

## 2019-06-13 DIAGNOSIS — R41.3 MEMORY DIFFICULTY: ICD-10-CM

## 2019-06-13 DIAGNOSIS — E10.42 DIABETIC POLYNEUROPATHY ASSOCIATED WITH TYPE 1 DIABETES MELLITUS (CMD): ICD-10-CM

## 2019-06-13 DIAGNOSIS — E03.9 ACQUIRED HYPOTHYROIDISM: ICD-10-CM

## 2019-06-13 DIAGNOSIS — E10.65 UNCONTROLLED TYPE 1 DIABETES MELLITUS WITH HYPERGLYCEMIA (CMD): ICD-10-CM

## 2019-06-13 DIAGNOSIS — R41.0 CONFUSION: Primary | ICD-10-CM

## 2019-06-13 DIAGNOSIS — Z00.00 PREVENTATIVE HEALTH CARE: ICD-10-CM

## 2019-06-13 LAB
EST. AVERAGE GLUCOSE BLD GHB EST-MCNC: 173 MG/DL (ref 0–154)
HBA1C MFR BLD: 7.7 % (ref 4.2–6)
TSH SERPL DL<=0.05 MIU/L-ACNC: 5.85 M[IU]/L (ref 0.3–4.82)

## 2019-06-13 PROCEDURE — 36415 COLL VENOUS BLD VENIPUNCTURE: CPT | Performed by: INTERNAL MEDICINE

## 2019-06-13 PROCEDURE — 99214 OFFICE O/P EST MOD 30 MIN: CPT | Performed by: FAMILY MEDICINE

## 2019-06-13 PROCEDURE — 83036 HEMOGLOBIN GLYCOSYLATED A1C: CPT | Performed by: INTERNAL MEDICINE

## 2019-06-13 PROCEDURE — 84443 ASSAY THYROID STIM HORMONE: CPT | Performed by: INTERNAL MEDICINE

## 2019-06-13 PROCEDURE — 3074F SYST BP LT 130 MM HG: CPT | Performed by: FAMILY MEDICINE

## 2019-06-13 PROCEDURE — 3078F DIAST BP <80 MM HG: CPT | Performed by: FAMILY MEDICINE

## 2019-06-13 SDOH — HEALTH STABILITY: MENTAL HEALTH: HOW OFTEN DO YOU HAVE A DRINK CONTAINING ALCOHOL?: NEVER

## 2019-06-18 ENCOUNTER — TELEPHONE (OUTPATIENT)
Dept: ENDOCRINOLOGY | Age: 64
End: 2019-06-18

## 2019-06-18 ENCOUNTER — OFFICE VISIT (OUTPATIENT)
Dept: PODIATRY | Age: 64
End: 2019-06-18

## 2019-06-18 DIAGNOSIS — Z72.0 TOBACCO CONSUMPTION: Primary | ICD-10-CM

## 2019-06-18 DIAGNOSIS — M20.5X9 HL (HALLUX LIMITUS), UNSPECIFIED LATERALITY: ICD-10-CM

## 2019-06-18 DIAGNOSIS — L97.512 SKIN ULCER OF RIGHT FOOT WITH FAT LAYER EXPOSED (CMD): ICD-10-CM

## 2019-06-18 PROCEDURE — 99212 OFFICE O/P EST SF 10 MIN: CPT | Performed by: PODIATRIST

## 2019-06-18 PROCEDURE — 11042 DBRDMT SUBQ TIS 1ST 20SQCM/<: CPT | Performed by: PODIATRIST

## 2019-06-18 RX ORDER — GABAPENTIN 100 MG/1
100 CAPSULE ORAL 2 TIMES DAILY
Qty: 60 CAPSULE | Refills: 3 | Status: SHIPPED | OUTPATIENT
Start: 2019-06-18 | End: 2020-02-05 | Stop reason: ALTCHOICE

## 2019-06-18 SDOH — HEALTH STABILITY: MENTAL HEALTH: HOW OFTEN DO YOU HAVE A DRINK CONTAINING ALCOHOL?: NEVER

## 2019-06-19 ENCOUNTER — TELEPHONE (OUTPATIENT)
Dept: ENDOCRINOLOGY | Age: 64
End: 2019-06-19

## 2019-06-19 DIAGNOSIS — E03.9 ACQUIRED HYPOTHYROIDISM: Primary | ICD-10-CM

## 2019-06-19 PROBLEM — Z72.0 TOBACCO CONSUMPTION: Status: ACTIVE | Noted: 2019-06-19

## 2019-06-19 RX ORDER — LEVOTHYROXINE SODIUM 0.15 MG/1
150 TABLET ORAL DAILY
Qty: 30 TABLET | Refills: 2 | Status: SHIPPED | OUTPATIENT
Start: 2019-06-19 | End: 2019-09-11 | Stop reason: SDUPTHER

## 2019-06-19 RX ORDER — LEVOTHYROXINE SODIUM 0.15 MG/1
150 TABLET ORAL DAILY
Qty: 30 TABLET | Refills: 2 | Status: SHIPPED | OUTPATIENT
Start: 2019-06-19 | End: 2019-06-19 | Stop reason: SDUPTHER

## 2019-06-27 ENCOUNTER — TELEPHONE (OUTPATIENT)
Dept: INTERNAL MEDICINE | Age: 64
End: 2019-06-27

## 2019-07-01 RX ORDER — ALPRAZOLAM 0.25 MG/1
TABLET ORAL
Qty: 2 TABLET | Refills: 0 | Status: SHIPPED | OUTPATIENT
Start: 2019-07-01 | End: 2019-07-30 | Stop reason: ALTCHOICE

## 2019-07-09 ENCOUNTER — OFFICE VISIT (OUTPATIENT)
Dept: PODIATRY | Age: 64
End: 2019-07-09

## 2019-07-09 DIAGNOSIS — M20.5X9 HL (HALLUX LIMITUS), UNSPECIFIED LATERALITY: Primary | ICD-10-CM

## 2019-07-09 DIAGNOSIS — Z79.4 TYPE 2 DIABETES MELLITUS WITH DIABETIC POLYNEUROPATHY, WITH LONG-TERM CURRENT USE OF INSULIN (CMD): ICD-10-CM

## 2019-07-09 DIAGNOSIS — E11.42 TYPE 2 DIABETES MELLITUS WITH DIABETIC POLYNEUROPATHY, WITH LONG-TERM CURRENT USE OF INSULIN (CMD): ICD-10-CM

## 2019-07-09 DIAGNOSIS — M20.40 HAMMER TOE, UNSPECIFIED LATERALITY: ICD-10-CM

## 2019-07-09 DIAGNOSIS — L97.512 SKIN ULCER OF RIGHT FOOT WITH FAT LAYER EXPOSED (CMD): ICD-10-CM

## 2019-07-09 DIAGNOSIS — Z72.0 TOBACCO CONSUMPTION: ICD-10-CM

## 2019-07-09 PROCEDURE — 99214 OFFICE O/P EST MOD 30 MIN: CPT | Performed by: PODIATRIST

## 2019-07-09 PROCEDURE — 29515 APPLICATION SHORT LEG SPLINT: CPT | Performed by: PODIATRIST

## 2019-07-09 PROCEDURE — L3260 AMBULATORY SURGICAL BOOT EAC: HCPCS

## 2019-07-09 PROCEDURE — 11042 DBRDMT SUBQ TIS 1ST 20SQCM/<: CPT | Performed by: PODIATRIST

## 2019-07-09 SDOH — HEALTH STABILITY: MENTAL HEALTH: HOW OFTEN DO YOU HAVE A DRINK CONTAINING ALCOHOL?: NEVER

## 2019-07-30 ENCOUNTER — OFFICE VISIT (OUTPATIENT)
Dept: PODIATRY | Age: 64
End: 2019-07-30

## 2019-07-30 ENCOUNTER — OFFICE VISIT (OUTPATIENT)
Dept: ENDOCRINOLOGY | Age: 64
End: 2019-07-30

## 2019-07-30 VITALS
WEIGHT: 164 LBS | DIASTOLIC BLOOD PRESSURE: 66 MMHG | HEART RATE: 72 BPM | HEIGHT: 72 IN | BODY MASS INDEX: 22.21 KG/M2 | SYSTOLIC BLOOD PRESSURE: 112 MMHG

## 2019-07-30 DIAGNOSIS — Z72.0 TOBACCO CONSUMPTION: Primary | ICD-10-CM

## 2019-07-30 DIAGNOSIS — M20.5X9 HL (HALLUX LIMITUS), UNSPECIFIED LATERALITY: ICD-10-CM

## 2019-07-30 DIAGNOSIS — E10.65 UNCONTROLLED TYPE 1 DIABETES MELLITUS WITH HYPERGLYCEMIA (CMD): Primary | ICD-10-CM

## 2019-07-30 DIAGNOSIS — E03.9 ACQUIRED HYPOTHYROIDISM: ICD-10-CM

## 2019-07-30 DIAGNOSIS — M20.41 HAMMER TOES OF BOTH FEET: ICD-10-CM

## 2019-07-30 DIAGNOSIS — E10.42 DIABETIC POLYNEUROPATHY ASSOCIATED WITH TYPE 1 DIABETES MELLITUS (CMD): ICD-10-CM

## 2019-07-30 DIAGNOSIS — M20.42 HAMMER TOES OF BOTH FEET: ICD-10-CM

## 2019-07-30 PROCEDURE — 3078F DIAST BP <80 MM HG: CPT | Performed by: INTERNAL MEDICINE

## 2019-07-30 PROCEDURE — 99214 OFFICE O/P EST MOD 30 MIN: CPT | Performed by: PODIATRIST

## 2019-07-30 PROCEDURE — 99215 OFFICE O/P EST HI 40 MIN: CPT | Performed by: INTERNAL MEDICINE

## 2019-07-30 PROCEDURE — 11042 DBRDMT SUBQ TIS 1ST 20SQCM/<: CPT | Performed by: PODIATRIST

## 2019-07-30 PROCEDURE — 3074F SYST BP LT 130 MM HG: CPT | Performed by: INTERNAL MEDICINE

## 2019-07-30 RX ORDER — INSULIN GLARGINE 100 [IU]/ML
8 INJECTION, SOLUTION SUBCUTANEOUS NIGHTLY
Status: SHIPPED | COMMUNITY
Start: 2019-07-30 | End: 2019-11-01 | Stop reason: SDUPTHER

## 2019-08-20 ENCOUNTER — IMAGING SERVICES (OUTPATIENT)
Dept: GENERAL RADIOLOGY | Age: 64
End: 2019-08-20
Attending: PODIATRIST

## 2019-08-20 ENCOUNTER — OFFICE VISIT (OUTPATIENT)
Dept: PODIATRY | Age: 64
End: 2019-08-20

## 2019-08-20 DIAGNOSIS — M20.41 HAMMER TOES OF BOTH FEET: Primary | ICD-10-CM

## 2019-08-20 DIAGNOSIS — M20.5X9 HL (HALLUX LIMITUS), UNSPECIFIED LATERALITY: ICD-10-CM

## 2019-08-20 DIAGNOSIS — L97.512 SKIN ULCER OF RIGHT FOOT WITH FAT LAYER EXPOSED (CMD): ICD-10-CM

## 2019-08-20 DIAGNOSIS — M20.42 HAMMER TOES OF BOTH FEET: ICD-10-CM

## 2019-08-20 DIAGNOSIS — M20.41 HAMMER TOES OF BOTH FEET: ICD-10-CM

## 2019-08-20 DIAGNOSIS — M20.42 HAMMER TOES OF BOTH FEET: Primary | ICD-10-CM

## 2019-08-20 PROCEDURE — 99213 OFFICE O/P EST LOW 20 MIN: CPT | Performed by: PODIATRIST

## 2019-09-10 ENCOUNTER — LAB SERVICES (OUTPATIENT)
Dept: LAB | Age: 64
End: 2019-09-10

## 2019-09-10 DIAGNOSIS — E03.9 ACQUIRED HYPOTHYROIDISM: ICD-10-CM

## 2019-09-10 DIAGNOSIS — Z00.00 PREVENTATIVE HEALTH CARE: ICD-10-CM

## 2019-09-10 DIAGNOSIS — E10.65 UNCONTROLLED TYPE 1 DIABETES MELLITUS WITH HYPERGLYCEMIA (CMD): ICD-10-CM

## 2019-09-10 LAB
ALBUMIN SERPL-MCNC: 4.1 G/DL (ref 3.6–5.1)
ALP SERPL-CCNC: 94 U/L (ref 45–115)
ALT SERPL W/O P-5'-P-CCNC: 16 U/L (ref 5–49)
AST SERPL-CCNC: 18 U/L (ref 14–43)
BASOPHIL %: 0.6 % (ref 0–1.2)
BASOPHIL ABSOLUTE #: 0 10*3/UL (ref 0–0.1)
BILIRUB SERPL-MCNC: 0.7 MG/DL (ref 0–1.3)
BUN SERPL-MCNC: 21 MG/DL (ref 6–27)
CALCIUM SERPL-MCNC: 9.7 MG/DL (ref 8.6–10.6)
CHLORIDE SERPL-SCNC: 104 MMOL/L (ref 96–107)
CHOLEST SERPL-MCNC: 140 MG/DL (ref 140–200)
CO2 SERPL-SCNC: 27 MMOL/L (ref 22–32)
CREAT SERPL-MCNC: 1.1 MG/DL (ref 0.6–1.6)
DIFFERENTIAL TYPE: ABNORMAL
EOSINOPHIL %: 1.8 % (ref 0–10)
EOSINOPHIL ABSOLUTE #: 0.1 10*3/UL (ref 0–0.5)
EST. AVERAGE GLUCOSE BLD GHB EST-MCNC: 170 MG/DL (ref 0–154)
GFR SERPL CREATININE-BSD FRML MDRD: >60 ML/MIN/{1.73M2}
GFR SERPL CREATININE-BSD FRML MDRD: >60 ML/MIN/{1.73M2}
GLUCOSE SERPL-MCNC: 272 MG/DL (ref 70–200)
HBA1C MFR BLD: 7.6 % (ref 4.2–6)
HDLC SERPL-MCNC: 51 MG/DL
HEMATOCRIT: 42.3 % (ref 40–51)
HEMOGLOBIN: 13.8 G/DL (ref 13.7–17.5)
LDLC SERPL CALC-MCNC: 76 MG/DL (ref 30–100)
LYMPH PERCENT: 19.1 % (ref 20.5–51.1)
LYMPHOCYTE ABSOLUTE #: 1.4 10*3/UL (ref 1.2–3.4)
MEAN CORPUSCULAR HGB CONCENTRATION: 32.6 % (ref 32–36)
MEAN CORPUSCULAR HGB: 30.1 PG (ref 27–34)
MEAN CORPUSCULAR VOLUME: 92.4 FL (ref 79–95)
MEAN PLATELET VOLUME: 10.2 FL (ref 8.6–12.4)
MONOCYTE ABSOLUTE #: 0.8 10*3/UL (ref 0.2–0.9)
MONOCYTE PERCENT: 10.7 % (ref 4.3–12.9)
NEUTROPHIL ABSOLUTE #: 4.9 10*3/UL (ref 1.4–6.5)
NEUTROPHIL PERCENT: 67.8 % (ref 34–73.5)
PLATELET COUNT: 177 10*3/UL (ref 150–400)
POTASSIUM SERPL-SCNC: 4.9 MMOL/L (ref 3.5–5.3)
PROT SERPL-MCNC: 7 G/DL (ref 6.4–8.5)
PSA SERPL-MCNC: 0.82 NG/ML (ref 0–4.4)
RED BLOOD CELL COUNT: 4.58 10*6/UL (ref 3.9–5.7)
RED CELL DISTRIBUTION WIDTH: 11.7 % (ref 11.3–14.8)
SODIUM SERPL-SCNC: 139 MMOL/L (ref 136–146)
TRIGL SERPL-MCNC: 64 MG/DL (ref 0–200)
TSH SERPL DL<=0.05 MIU/L-ACNC: 0.66 M[IU]/L (ref 0.3–4.82)
WHITE BLOOD CELL COUNT: 7.2 10*3/UL (ref 4–10)

## 2019-09-10 PROCEDURE — 80050 GENERAL HEALTH PANEL: CPT | Performed by: INTERNAL MEDICINE

## 2019-09-10 PROCEDURE — 82570 ASSAY OF URINE CREATININE: CPT | Performed by: INTERNAL MEDICINE

## 2019-09-10 PROCEDURE — 80061 LIPID PANEL: CPT | Performed by: INTERNAL MEDICINE

## 2019-09-10 PROCEDURE — G0103 PSA SCREENING: HCPCS | Performed by: INTERNAL MEDICINE

## 2019-09-10 PROCEDURE — 82043 UR ALBUMIN QUANTITATIVE: CPT | Performed by: INTERNAL MEDICINE

## 2019-09-10 PROCEDURE — 36415 COLL VENOUS BLD VENIPUNCTURE: CPT | Performed by: INTERNAL MEDICINE

## 2019-09-10 PROCEDURE — 83036 HEMOGLOBIN GLYCOSYLATED A1C: CPT | Performed by: INTERNAL MEDICINE

## 2019-09-11 DIAGNOSIS — E03.9 ACQUIRED HYPOTHYROIDISM: Primary | ICD-10-CM

## 2019-09-11 LAB
ALBUMIN/CREAT UR: 16.2 MG/G (ref 0–30)
CREAT UR-MCNC: 188.2 MG/DL
MICROALBUMIN UR-MCNC: 30.5 MG/L

## 2019-09-11 RX ORDER — LEVOTHYROXINE SODIUM 0.15 MG/1
150 TABLET ORAL DAILY
Qty: 30 TABLET | Refills: 8 | Status: SHIPPED | OUTPATIENT
Start: 2019-09-11 | End: 2019-09-11 | Stop reason: SDUPTHER

## 2019-09-11 RX ORDER — LEVOTHYROXINE SODIUM 0.15 MG/1
150 TABLET ORAL DAILY
Qty: 30 TABLET | Refills: 5 | Status: SHIPPED | OUTPATIENT
Start: 2019-09-11 | End: 2019-09-11 | Stop reason: SDUPTHER

## 2019-09-11 RX ORDER — LEVOTHYROXINE SODIUM 0.15 MG/1
150 TABLET ORAL DAILY
Qty: 30 TABLET | Refills: 8 | Status: SHIPPED | OUTPATIENT
Start: 2019-09-11 | End: 2019-11-01 | Stop reason: SDUPTHER

## 2019-09-17 RX ORDER — LEVOTHYROXINE SODIUM 0.15 MG/1
TABLET ORAL
Qty: 30 TABLET | Refills: 2 | OUTPATIENT
Start: 2019-09-17

## 2019-09-23 ENCOUNTER — TELEPHONE (OUTPATIENT)
Dept: ENDOCRINOLOGY | Age: 64
End: 2019-09-23

## 2019-09-23 ENCOUNTER — TELEPHONE (OUTPATIENT)
Dept: PODIATRY | Age: 64
End: 2019-09-23

## 2019-10-15 ENCOUNTER — IMAGING SERVICES (OUTPATIENT)
Dept: MRI IMAGING | Age: 64
End: 2019-10-15
Attending: FAMILY MEDICINE

## 2019-10-15 DIAGNOSIS — R41.3 MEMORY DIFFICULTY: ICD-10-CM

## 2019-10-15 DIAGNOSIS — R41.0 CONFUSION: ICD-10-CM

## 2019-10-15 DIAGNOSIS — R90.89 ABNORMAL BRAIN MRI: Primary | ICD-10-CM

## 2019-10-15 PROCEDURE — A9585 GADOBUTROL INJECTION: HCPCS

## 2019-10-15 PROCEDURE — 70553 MRI BRAIN STEM W/O & W/DYE: CPT | Performed by: RADIOLOGY

## 2019-10-15 RX ORDER — GADOBUTROL 604.72 MG/ML
30 INJECTION INTRAVENOUS ONCE
Status: COMPLETED | OUTPATIENT
Start: 2019-10-15 | End: 2019-10-15

## 2019-10-15 RX ADMIN — GADOBUTROL 7 ML: 604.72 INJECTION INTRAVENOUS at 09:15

## 2019-10-16 ENCOUNTER — TELEPHONE (OUTPATIENT)
Dept: INTERNAL MEDICINE | Age: 64
End: 2019-10-16

## 2019-10-24 ENCOUNTER — TELEPHONE (OUTPATIENT)
Dept: ENDOCRINOLOGY | Age: 64
End: 2019-10-24

## 2019-10-25 PROCEDURE — 93010 ELECTROCARDIOGRAM REPORT: CPT | Performed by: INTERNAL MEDICINE

## 2019-10-25 PROCEDURE — 99223 1ST HOSP IP/OBS HIGH 75: CPT | Performed by: HOSPITALIST

## 2019-10-26 PROCEDURE — 99232 SBSQ HOSP IP/OBS MODERATE 35: CPT | Performed by: HOSPITALIST

## 2019-10-26 PROCEDURE — 93010 ELECTROCARDIOGRAM REPORT: CPT | Performed by: INTERNAL MEDICINE

## 2019-10-27 PROCEDURE — 99232 SBSQ HOSP IP/OBS MODERATE 35: CPT | Performed by: HOSPITALIST

## 2019-10-27 PROCEDURE — 93010 ELECTROCARDIOGRAM REPORT: CPT | Performed by: INTERNAL MEDICINE

## 2019-10-28 ENCOUNTER — TELEPHONE (OUTPATIENT)
Dept: ENDOCRINOLOGY | Age: 64
End: 2019-10-28

## 2019-10-28 ENCOUNTER — EXTERNAL RECORD (OUTPATIENT)
Dept: HEALTH INFORMATION MANAGEMENT | Facility: OTHER | Age: 64
End: 2019-10-28

## 2019-10-28 PROCEDURE — 93018 CV STRESS TEST I&R ONLY: CPT | Performed by: INTERNAL MEDICINE

## 2019-10-28 PROCEDURE — 99232 SBSQ HOSP IP/OBS MODERATE 35: CPT | Performed by: HOSPITALIST

## 2019-10-28 PROCEDURE — 78452 HT MUSCLE IMAGE SPECT MULT: CPT | Performed by: INTERNAL MEDICINE

## 2019-10-28 PROCEDURE — 93016 CV STRESS TEST SUPVJ ONLY: CPT | Performed by: INTERNAL MEDICINE

## 2019-10-29 PROCEDURE — 99232 SBSQ HOSP IP/OBS MODERATE 35: CPT | Performed by: HOSPITALIST

## 2019-10-30 PROCEDURE — 99239 HOSP IP/OBS DSCHRG MGMT >30: CPT | Performed by: HOSPITALIST

## 2019-11-01 ENCOUNTER — OFFICE VISIT (OUTPATIENT)
Dept: ENDOCRINOLOGY | Age: 64
End: 2019-11-01

## 2019-11-01 VITALS
HEART RATE: 76 BPM | WEIGHT: 168 LBS | BODY MASS INDEX: 22.75 KG/M2 | SYSTOLIC BLOOD PRESSURE: 90 MMHG | DIASTOLIC BLOOD PRESSURE: 60 MMHG | HEIGHT: 72 IN

## 2019-11-01 DIAGNOSIS — E03.9 ACQUIRED HYPOTHYROIDISM: ICD-10-CM

## 2019-11-01 DIAGNOSIS — E10.649 HYPOGLYCEMIA UNAWARENESS IN TYPE 1 DIABETES MELLITUS (CMD): ICD-10-CM

## 2019-11-01 DIAGNOSIS — E10.65 UNCONTROLLED TYPE 1 DIABETES MELLITUS WITH HYPERGLYCEMIA (CMD): Primary | ICD-10-CM

## 2019-11-01 PROCEDURE — 99214 OFFICE O/P EST MOD 30 MIN: CPT | Performed by: INTERNAL MEDICINE

## 2019-11-01 PROCEDURE — 3078F DIAST BP <80 MM HG: CPT | Performed by: INTERNAL MEDICINE

## 2019-11-01 PROCEDURE — 3074F SYST BP LT 130 MM HG: CPT | Performed by: INTERNAL MEDICINE

## 2019-11-01 RX ORDER — LEVOTHYROXINE SODIUM 0.15 MG/1
150 TABLET ORAL DAILY
Qty: 90 TABLET | Refills: 2 | Status: SHIPPED | OUTPATIENT
Start: 2019-11-01 | End: 2020-02-07 | Stop reason: SDUPTHER

## 2019-11-01 RX ORDER — INSULIN GLARGINE 100 [IU]/ML
8 INJECTION, SOLUTION SUBCUTANEOUS NIGHTLY
Qty: 15 ML | Refills: 2 | Status: SHIPPED | OUTPATIENT
Start: 2019-11-01 | End: 2020-05-01 | Stop reason: SDUPTHER

## 2019-11-08 ENCOUNTER — TELEPHONE (OUTPATIENT)
Dept: ENDOCRINOLOGY | Age: 64
End: 2019-11-08

## 2019-11-08 RX ORDER — LANCETS
EACH MISCELLANEOUS
Qty: 300 EACH | Refills: 3 | Status: SHIPPED | OUTPATIENT
Start: 2019-11-08 | End: 2019-11-12 | Stop reason: SDUPTHER

## 2019-11-11 ENCOUNTER — TELEPHONE (OUTPATIENT)
Dept: ENDOCRINOLOGY | Age: 64
End: 2019-11-11

## 2019-11-26 ENCOUNTER — OFFICE VISIT (OUTPATIENT)
Dept: PODIATRY | Age: 64
End: 2019-11-26

## 2019-11-26 DIAGNOSIS — L97.512 SKIN ULCER OF RIGHT FOOT WITH FAT LAYER EXPOSED (CMD): ICD-10-CM

## 2019-11-26 DIAGNOSIS — M20.5X9 HL (HALLUX LIMITUS), UNSPECIFIED LATERALITY: ICD-10-CM

## 2019-11-26 DIAGNOSIS — M20.42 HAMMER TOES OF BOTH FEET: ICD-10-CM

## 2019-11-26 DIAGNOSIS — Z72.0 TOBACCO CONSUMPTION: Primary | ICD-10-CM

## 2019-11-26 DIAGNOSIS — M20.41 HAMMER TOES OF BOTH FEET: ICD-10-CM

## 2019-11-26 PROCEDURE — 99213 OFFICE O/P EST LOW 20 MIN: CPT | Performed by: PODIATRIST

## 2019-11-26 PROCEDURE — 11721 DEBRIDE NAIL 6 OR MORE: CPT | Performed by: PODIATRIST

## 2019-11-27 ENCOUNTER — TELEPHONE (OUTPATIENT)
Dept: INTERNAL MEDICINE | Age: 64
End: 2019-11-27

## 2020-02-05 ENCOUNTER — LAB SERVICES (OUTPATIENT)
Dept: LAB | Age: 65
End: 2020-02-05

## 2020-02-05 ENCOUNTER — OFFICE VISIT (OUTPATIENT)
Dept: ENDOCRINOLOGY | Age: 65
End: 2020-02-05

## 2020-02-05 VITALS
BODY MASS INDEX: 22.08 KG/M2 | HEIGHT: 72 IN | DIASTOLIC BLOOD PRESSURE: 64 MMHG | WEIGHT: 163 LBS | SYSTOLIC BLOOD PRESSURE: 112 MMHG | HEART RATE: 72 BPM

## 2020-02-05 DIAGNOSIS — E03.9 ACQUIRED HYPOTHYROIDISM: ICD-10-CM

## 2020-02-05 DIAGNOSIS — E10.65 UNCONTROLLED TYPE 1 DIABETES MELLITUS WITH HYPERGLYCEMIA (CMD): ICD-10-CM

## 2020-02-05 DIAGNOSIS — E10.649 HYPOGLYCEMIA UNAWARENESS IN TYPE 1 DIABETES MELLITUS (CMD): ICD-10-CM

## 2020-02-05 DIAGNOSIS — E10.65 UNCONTROLLED TYPE 1 DIABETES MELLITUS WITH HYPERGLYCEMIA (CMD): Primary | ICD-10-CM

## 2020-02-05 LAB
EST. AVERAGE GLUCOSE BLD GHB EST-MCNC: 195 MG/DL (ref 0–154)
HBA1C MFR BLD: 8.4 % (ref 4.2–6)
TSH SERPL DL<=0.05 MIU/L-ACNC: 0.17 M[IU]/L (ref 0.3–4.82)

## 2020-02-05 PROCEDURE — 99215 OFFICE O/P EST HI 40 MIN: CPT | Performed by: INTERNAL MEDICINE

## 2020-02-05 PROCEDURE — 36415 COLL VENOUS BLD VENIPUNCTURE: CPT | Performed by: INTERNAL MEDICINE

## 2020-02-05 PROCEDURE — 84443 ASSAY THYROID STIM HORMONE: CPT | Performed by: INTERNAL MEDICINE

## 2020-02-05 PROCEDURE — 83036 HEMOGLOBIN GLYCOSYLATED A1C: CPT | Performed by: INTERNAL MEDICINE

## 2020-02-07 ENCOUNTER — TELEPHONE (OUTPATIENT)
Dept: ENDOCRINOLOGY | Age: 65
End: 2020-02-07

## 2020-02-07 DIAGNOSIS — E03.9 ACQUIRED HYPOTHYROIDISM: ICD-10-CM

## 2020-02-07 RX ORDER — LEVOTHYROXINE SODIUM 0.15 MG/1
TABLET ORAL
Qty: 90 TABLET | Refills: 1 | Status: SHIPPED | OUTPATIENT
Start: 2020-02-07 | End: 2020-05-01 | Stop reason: DRUGHIGH

## 2020-03-10 ENCOUNTER — OFFICE VISIT (OUTPATIENT)
Dept: PODIATRY | Age: 65
End: 2020-03-10

## 2020-03-10 DIAGNOSIS — B35.1 DERMATOPHYTOSIS OF NAIL: ICD-10-CM

## 2020-03-10 DIAGNOSIS — L60.3 NAIL DYSTROPHY: ICD-10-CM

## 2020-03-10 DIAGNOSIS — M20.5X9 HL (HALLUX LIMITUS), UNSPECIFIED LATERALITY: ICD-10-CM

## 2020-03-10 DIAGNOSIS — E11.42 TYPE 2 DIABETES MELLITUS WITH DIABETIC POLYNEUROPATHY, WITH LONG-TERM CURRENT USE OF INSULIN (CMD): Primary | ICD-10-CM

## 2020-03-10 DIAGNOSIS — Z79.4 TYPE 2 DIABETES MELLITUS WITH DIABETIC POLYNEUROPATHY, WITH LONG-TERM CURRENT USE OF INSULIN (CMD): Primary | ICD-10-CM

## 2020-03-10 DIAGNOSIS — M20.41 HAMMER TOES OF BOTH FEET: ICD-10-CM

## 2020-03-10 DIAGNOSIS — S98.132A AMPUTATED TOE OF LEFT FOOT (CMD): ICD-10-CM

## 2020-03-10 DIAGNOSIS — M20.42 HAMMER TOES OF BOTH FEET: ICD-10-CM

## 2020-03-10 PROCEDURE — 99213 OFFICE O/P EST LOW 20 MIN: CPT | Performed by: NURSE PRACTITIONER

## 2020-03-10 PROCEDURE — 11721 DEBRIDE NAIL 6 OR MORE: CPT | Performed by: NURSE PRACTITIONER

## 2020-03-10 SDOH — HEALTH STABILITY: MENTAL HEALTH: HOW OFTEN DO YOU HAVE A DRINK CONTAINING ALCOHOL?: NEVER

## 2020-03-16 ASSESSMENT — ENCOUNTER SYMPTOMS
ACTIVITY CHANGE: 0
BRUISES/BLEEDS EASILY: 0
NAUSEA: 0
WEAKNESS: 0
CONFUSION: 0
SLEEP DISTURBANCE: 0
DIAPHORESIS: 0
APPETITE CHANGE: 0
BACK PAIN: 0
LIGHT-HEADEDNESS: 0
FATIGUE: 0
SORE THROAT: 0
DIARRHEA: 0
CHILLS: 0
TREMORS: 0
SEIZURES: 0
SHORTNESS OF BREATH: 0
VOMITING: 0
COUGH: 0
CHEST TIGHTNESS: 0
WOUND: 0
ADENOPATHY: 0
ENDOCRINE COMMENTS: HISTORY OF DIABETES
DIZZINESS: 0
AGITATION: 0
FEVER: 0
PHOTOPHOBIA: 0
NUMBNESS: 0
ABDOMINAL PAIN: 0
COLOR CHANGE: 0
HEADACHES: 0
CONSTIPATION: 0

## 2020-04-08 ENCOUNTER — TELEPHONE (OUTPATIENT)
Dept: PSYCHOLOGY | Age: 65
End: 2020-04-08

## 2020-04-14 ENCOUNTER — APPOINTMENT (OUTPATIENT)
Dept: PSYCHOLOGY | Age: 65
End: 2020-04-14

## 2020-04-21 ENCOUNTER — TELEPHONE (OUTPATIENT)
Dept: FAMILY MEDICINE | Age: 65
End: 2020-04-21

## 2020-04-21 PROCEDURE — G0180 MD CERTIFICATION HHA PATIENT: HCPCS | Performed by: FAMILY MEDICINE

## 2020-04-23 ENCOUNTER — OFFICE VISIT (OUTPATIENT)
Dept: INTERNAL MEDICINE | Age: 65
End: 2020-04-23

## 2020-04-23 ENCOUNTER — LAB SERVICES (OUTPATIENT)
Dept: LAB | Age: 65
End: 2020-04-23

## 2020-04-23 DIAGNOSIS — R41.3 MEMORY DIFFICULTY: ICD-10-CM

## 2020-04-23 DIAGNOSIS — N28.9 RENAL INSUFFICIENCY: ICD-10-CM

## 2020-04-23 DIAGNOSIS — E10.42 DIABETIC POLYNEUROPATHY ASSOCIATED WITH TYPE 1 DIABETES MELLITUS (CMD): Primary | ICD-10-CM

## 2020-04-23 LAB
ALBUMIN SERPL-MCNC: 3.6 G/DL (ref 3.6–5.1)
ALP SERPL-CCNC: 99 U/L (ref 45–115)
ALT SERPL W/O P-5'-P-CCNC: 33 U/L (ref 5–49)
AST SERPL-CCNC: 29 U/L (ref 14–43)
BILIRUB SERPL-MCNC: 0.5 MG/DL (ref 0–1.3)
BUN SERPL-MCNC: 20 MG/DL (ref 6–27)
CALCIUM SERPL-MCNC: 8.9 MG/DL (ref 8.6–10.6)
CHLORIDE SERPL-SCNC: 103 MMOL/L (ref 96–107)
CO2 SERPL-SCNC: 27 MMOL/L (ref 22–32)
CREAT SERPL-MCNC: 1 MG/DL (ref 0.6–1.6)
GFR SERPL CREATININE-BSD FRML MDRD: >60 ML/MIN/{1.73M2}
GFR SERPL CREATININE-BSD FRML MDRD: >60 ML/MIN/{1.73M2}
GLUCOSE SERPL-MCNC: 218 MG/DL (ref 70–200)
POTASSIUM SERPL-SCNC: 5.2 MMOL/L (ref 3.5–5.3)
PROT SERPL-MCNC: 6.3 G/DL (ref 6.4–8.5)
SODIUM SERPL-SCNC: 136 MMOL/L (ref 136–146)
T4 FREE SERPL-MCNC: 1.08 NG/DL (ref 0.78–2.19)
TSH SERPL DL<=0.05 MIU/L-ACNC: 13.1 M[IU]/L (ref 0.3–4.82)

## 2020-04-23 PROCEDURE — 85027 COMPLETE CBC AUTOMATED: CPT | Performed by: FAMILY MEDICINE

## 2020-04-23 PROCEDURE — 99443 TELEPHONE E&M BY PHYSICIAN EST PT NOT ORIG PREV 7 DAYS 21-30 MIN: CPT | Performed by: FAMILY MEDICINE

## 2020-04-23 PROCEDURE — 36415 COLL VENOUS BLD VENIPUNCTURE: CPT | Performed by: FAMILY MEDICINE

## 2020-04-23 PROCEDURE — 84443 ASSAY THYROID STIM HORMONE: CPT | Performed by: FAMILY MEDICINE

## 2020-04-23 PROCEDURE — 85007 BL SMEAR W/DIFF WBC COUNT: CPT | Performed by: FAMILY MEDICINE

## 2020-04-23 PROCEDURE — 84439 ASSAY OF FREE THYROXINE: CPT | Performed by: FAMILY MEDICINE

## 2020-04-23 PROCEDURE — 80053 COMPREHEN METABOLIC PANEL: CPT | Performed by: FAMILY MEDICINE

## 2020-04-24 ENCOUNTER — TELEPHONE (OUTPATIENT)
Dept: ENDOCRINOLOGY | Age: 65
End: 2020-04-24

## 2020-04-24 ENCOUNTER — TELEPHONE (OUTPATIENT)
Dept: INTERNAL MEDICINE | Age: 65
End: 2020-04-24

## 2020-04-24 LAB
DIFFERENTIAL TYPE: ABNORMAL
HEMATOCRIT: 37 % (ref 40–51)
HEMOGLOBIN: 11.9 G/DL (ref 13.7–17.5)
LYMPH PERCENT MD: 15 % (ref 20.5–51.1)
LYMPHOCYTE ABSOLUTE # MD: 1.3 /UL (ref 1.2–3.4)
MEAN CORPUSCULAR HGB CONCENTRATION: 32.2 % (ref 32–36)
MEAN CORPUSCULAR HGB: 30.1 PG (ref 27–34)
MEAN CORPUSCULAR VOLUME: 93.7 FL (ref 79–95)
MEAN PLATELET VOLUME: 10.2 FL (ref 8.6–12.4)
MONOCYTE ABSOLUTE # MD: 1 /UL (ref 0.2–0.9)
MONOCYTE PERCENT MD: 12 % (ref 4.3–12.9)
NEUTROPHIL ABSOLUTE # MD: 6.3 /UL (ref 1.4–6.5)
NEUTROPHIL PERCENT MD: 73 % (ref 34–73.5)
PLATELET COUNT: 281 10*3/UL (ref 150–400)
RED BLOOD CELL COUNT: 3.95 10*6/UL (ref 3.9–5.7)
RED CELL DISTRIBUTION WIDTH: 12.3 % (ref 11.3–14.8)
WHITE BLOOD CELL COUNT: 8.6 10*3/UL (ref 4–10)

## 2020-04-26 RX ORDER — PROCHLORPERAZINE 25 MG/1
SUPPOSITORY RECTAL
Status: CANCELLED | OUTPATIENT
Start: 2020-04-26

## 2020-04-26 RX ORDER — PROCHLORPERAZINE 25 MG/1
1 SUPPOSITORY RECTAL SEE ADMIN INSTRUCTIONS
Qty: 1 EACH | Refills: 12 | Status: SHIPPED | OUTPATIENT
Start: 2020-04-26

## 2020-04-26 RX ORDER — PROCHLORPERAZINE 25 MG/1
1 SUPPOSITORY RECTAL SEE ADMIN INSTRUCTIONS
Qty: 1 EACH | Refills: 3 | Status: SHIPPED | OUTPATIENT
Start: 2020-04-26 | End: 2020-10-13 | Stop reason: ALTCHOICE

## 2020-04-27 ENCOUNTER — TELEPHONE (OUTPATIENT)
Dept: INTERNAL MEDICINE | Age: 65
End: 2020-04-27

## 2020-04-27 DIAGNOSIS — D64.9 LOW HEMOGLOBIN: ICD-10-CM

## 2020-04-27 DIAGNOSIS — D64.9 MILD ANEMIA: Primary | ICD-10-CM

## 2020-04-28 ENCOUNTER — TELEPHONE (OUTPATIENT)
Dept: FAMILY MEDICINE | Age: 65
End: 2020-04-28

## 2020-04-28 DIAGNOSIS — E03.9 HYPOTHYROIDISM, UNSPECIFIED TYPE: Primary | ICD-10-CM

## 2020-05-01 ENCOUNTER — TELEPHONE (OUTPATIENT)
Dept: INTERNAL MEDICINE | Age: 65
End: 2020-05-01

## 2020-05-01 ENCOUNTER — TELEPHONE (OUTPATIENT)
Dept: FAMILY MEDICINE | Age: 65
End: 2020-05-01

## 2020-05-01 DIAGNOSIS — E10.65 UNCONTROLLED TYPE 1 DIABETES MELLITUS WITH HYPERGLYCEMIA (CMD): ICD-10-CM

## 2020-05-01 RX ORDER — INSULIN GLARGINE 100 [IU]/ML
INJECTION, SOLUTION SUBCUTANEOUS
Status: SHIPPED | COMMUNITY
Start: 2020-05-01 | End: 2020-05-05 | Stop reason: SDUPTHER

## 2020-05-01 RX ORDER — LEVOTHYROXINE SODIUM 175 UG/1
175 TABLET ORAL DAILY
Qty: 90 TABLET | Refills: 0 | Status: SHIPPED | OUTPATIENT
Start: 2020-05-01 | End: 2020-05-05 | Stop reason: SDUPTHER

## 2020-05-05 ENCOUNTER — TELEPHONE (OUTPATIENT)
Dept: FAMILY MEDICINE | Age: 65
End: 2020-05-05

## 2020-05-05 RX ORDER — LEVOTHYROXINE SODIUM 175 UG/1
175 TABLET ORAL DAILY
Qty: 90 TABLET | Refills: 0 | Status: SHIPPED | OUTPATIENT
Start: 2020-05-05 | End: 2020-10-13 | Stop reason: DRUGHIGH

## 2020-05-05 RX ORDER — INSULIN GLARGINE 100 [IU]/ML
10 INJECTION, SOLUTION SUBCUTANEOUS NIGHTLY
Qty: 15 ML | Refills: 3 | Status: SHIPPED | OUTPATIENT
Start: 2020-05-05 | End: 2020-05-26 | Stop reason: SDUPTHER

## 2020-05-22 ENCOUNTER — TELEPHONE (OUTPATIENT)
Dept: FAMILY MEDICINE | Age: 65
End: 2020-05-22

## 2020-05-22 DIAGNOSIS — E10.65 UNCONTROLLED TYPE 1 DIABETES MELLITUS WITH HYPERGLYCEMIA (CMD): ICD-10-CM

## 2020-05-26 RX ORDER — INSULIN GLARGINE 100 [IU]/ML
8 INJECTION, SOLUTION SUBCUTANEOUS NIGHTLY
Status: SHIPPED | COMMUNITY
Start: 2020-05-26

## 2020-06-09 ENCOUNTER — EXTERNAL RECORD (OUTPATIENT)
Dept: HEALTH INFORMATION MANAGEMENT | Facility: OTHER | Age: 65
End: 2020-06-09

## 2020-06-10 ENCOUNTER — TELEPHONE (OUTPATIENT)
Dept: INTERNAL MEDICINE | Age: 65
End: 2020-06-10

## 2020-06-16 ENCOUNTER — APPOINTMENT (OUTPATIENT)
Dept: PODIATRY | Age: 65
End: 2020-06-16

## 2020-06-16 ENCOUNTER — TELEPHONE (OUTPATIENT)
Dept: GENERAL RADIOLOGY | Age: 65
End: 2020-06-16

## 2020-06-17 ENCOUNTER — OFFICE VISIT (OUTPATIENT)
Dept: PODIATRY | Age: 65
End: 2020-06-17

## 2020-06-17 DIAGNOSIS — M79.672 BILATERAL FOOT PAIN: ICD-10-CM

## 2020-06-17 DIAGNOSIS — E11.42 TYPE 2 DIABETES MELLITUS WITH DIABETIC POLYNEUROPATHY, WITH LONG-TERM CURRENT USE OF INSULIN (CMD): Primary | ICD-10-CM

## 2020-06-17 DIAGNOSIS — S98.132A AMPUTATED TOE OF LEFT FOOT (CMD): ICD-10-CM

## 2020-06-17 DIAGNOSIS — M20.5X9 HL (HALLUX LIMITUS), UNSPECIFIED LATERALITY: ICD-10-CM

## 2020-06-17 DIAGNOSIS — L84 CORNS AND CALLOSITIES: ICD-10-CM

## 2020-06-17 DIAGNOSIS — M20.42 HAMMER TOES OF BOTH FEET: ICD-10-CM

## 2020-06-17 DIAGNOSIS — Z79.4 TYPE 2 DIABETES MELLITUS WITH DIABETIC POLYNEUROPATHY, WITH LONG-TERM CURRENT USE OF INSULIN (CMD): Primary | ICD-10-CM

## 2020-06-17 DIAGNOSIS — B35.1 DERMATOPHYTOSIS OF NAIL: ICD-10-CM

## 2020-06-17 DIAGNOSIS — M20.41 HAMMER TOES OF BOTH FEET: ICD-10-CM

## 2020-06-17 DIAGNOSIS — L60.3 NAIL DYSTROPHY: ICD-10-CM

## 2020-06-17 DIAGNOSIS — M79.671 BILATERAL FOOT PAIN: ICD-10-CM

## 2020-06-17 PROCEDURE — 11056 PARNG/CUTG B9 HYPRKR LES 2-4: CPT | Performed by: NURSE PRACTITIONER

## 2020-06-17 PROCEDURE — 11721 DEBRIDE NAIL 6 OR MORE: CPT | Performed by: NURSE PRACTITIONER

## 2020-06-17 SDOH — HEALTH STABILITY: MENTAL HEALTH: HOW OFTEN DO YOU HAVE A DRINK CONTAINING ALCOHOL?: NEVER

## 2020-06-26 ENCOUNTER — OFF PREMISE (OUTPATIENT)
Dept: INTERNAL MEDICINE | Age: 65
End: 2020-06-26

## 2020-06-26 DIAGNOSIS — I95.9 HYPOTENSION, UNSPECIFIED: ICD-10-CM

## 2020-06-26 DIAGNOSIS — E03.9 HYPOTHYROIDISM, UNSPECIFIED: ICD-10-CM

## 2020-06-26 DIAGNOSIS — Z79.4 LONG TERM CURRENT USE OF INSULIN (CMD): ICD-10-CM

## 2020-06-26 DIAGNOSIS — Z89.422 ACQUIRED ABSENCE OF OTHER LEFT TOE(S) (CMD): ICD-10-CM

## 2020-06-26 DIAGNOSIS — E11.9 DIABETES MELLITUS WITHOUT COMPLICATION (CMD): Primary | ICD-10-CM

## 2020-06-26 DIAGNOSIS — Z79.82 LONG TERM (CURRENT) USE OF ASPIRIN: ICD-10-CM

## 2020-07-02 ENCOUNTER — TELEPHONE (OUTPATIENT)
Dept: ENDOCRINOLOGY | Age: 65
End: 2020-07-02

## 2020-07-09 DIAGNOSIS — E10.65 UNCONTROLLED TYPE 1 DIABETES MELLITUS WITH HYPERGLYCEMIA (CMD): ICD-10-CM

## 2020-07-10 RX ORDER — LANCETS 33 GAUGE
EACH MISCELLANEOUS
Qty: 200 EACH | Refills: 11 | Status: SHIPPED | OUTPATIENT
Start: 2020-07-10

## 2020-07-10 RX ORDER — LANCETS 33 GAUGE
EACH MISCELLANEOUS
Qty: 300 EACH | Refills: 11 | Status: SHIPPED | OUTPATIENT
Start: 2020-07-10 | End: 2020-07-10 | Stop reason: SDUPTHER

## 2020-07-23 ENCOUNTER — TELEPHONE (OUTPATIENT)
Dept: PSYCHOLOGY | Age: 65
End: 2020-07-23

## 2020-07-24 ENCOUNTER — APPOINTMENT (OUTPATIENT)
Dept: PSYCHOLOGY | Age: 65
End: 2020-07-24

## 2020-08-10 ENCOUNTER — TELEPHONE (OUTPATIENT)
Dept: PODIATRY | Age: 65
End: 2020-08-10

## 2020-08-11 ENCOUNTER — OFFICE VISIT (OUTPATIENT)
Dept: PODIATRY | Age: 65
End: 2020-08-11

## 2020-08-11 DIAGNOSIS — E11.42 TYPE 2 DIABETES MELLITUS WITH DIABETIC POLYNEUROPATHY, WITH LONG-TERM CURRENT USE OF INSULIN (CMD): Primary | ICD-10-CM

## 2020-08-11 DIAGNOSIS — L60.3 NAIL DYSTROPHY: ICD-10-CM

## 2020-08-11 DIAGNOSIS — Z79.4 TYPE 2 DIABETES MELLITUS WITH DIABETIC POLYNEUROPATHY, WITH LONG-TERM CURRENT USE OF INSULIN (CMD): Primary | ICD-10-CM

## 2020-08-11 DIAGNOSIS — M79.672 BILATERAL FOOT PAIN: ICD-10-CM

## 2020-08-11 DIAGNOSIS — M20.5X9 HL (HALLUX LIMITUS), UNSPECIFIED LATERALITY: ICD-10-CM

## 2020-08-11 DIAGNOSIS — Z72.0 TOBACCO CONSUMPTION: ICD-10-CM

## 2020-08-11 DIAGNOSIS — M79.671 BILATERAL FOOT PAIN: ICD-10-CM

## 2020-08-11 PROCEDURE — 99213 OFFICE O/P EST LOW 20 MIN: CPT | Performed by: PODIATRIST

## 2020-08-17 RX ORDER — LEVOTHYROXINE SODIUM 175 UG/1
175 TABLET ORAL DAILY
Qty: 90 TABLET | Refills: 0 | OUTPATIENT
Start: 2020-08-17

## 2020-09-29 RX ORDER — LEVOTHYROXINE SODIUM 175 UG/1
175 TABLET ORAL DAILY
Qty: 90 TABLET | Refills: 0 | OUTPATIENT
Start: 2020-09-29

## 2020-10-01 ENCOUNTER — OFFICE VISIT (OUTPATIENT)
Dept: OPHTHALMOLOGY | Age: 65
End: 2020-10-01

## 2020-10-01 DIAGNOSIS — E10.3393 TYPE 1 DIABETES MELLITUS WITH MODERATE NONPROLIFERATIVE RETINOPATHY OF BOTH EYES WITHOUT MACULAR EDEMA (CMD): Primary | ICD-10-CM

## 2020-10-01 DIAGNOSIS — H40.053 OCULAR HYPERTENSION, BILATERAL: ICD-10-CM

## 2020-10-01 DIAGNOSIS — H35.373 MACULAR PUCKER, BILATERAL: ICD-10-CM

## 2020-10-01 PROCEDURE — 99214 OFFICE O/P EST MOD 30 MIN: CPT | Performed by: OPHTHALMOLOGY

## 2020-10-01 SDOH — HEALTH STABILITY: MENTAL HEALTH: HOW OFTEN DO YOU HAVE A DRINK CONTAINING ALCOHOL?: NEVER

## 2020-10-13 ENCOUNTER — OFFICE VISIT (OUTPATIENT)
Dept: INTERNAL MEDICINE | Age: 65
End: 2020-10-13

## 2020-10-13 ENCOUNTER — IMAGING SERVICES (OUTPATIENT)
Dept: GENERAL RADIOLOGY | Age: 65
End: 2020-10-13
Attending: FAMILY MEDICINE

## 2020-10-13 ENCOUNTER — LAB SERVICES (OUTPATIENT)
Dept: LAB | Age: 65
End: 2020-10-13

## 2020-10-13 VITALS
BODY MASS INDEX: 21.84 KG/M2 | HEART RATE: 88 BPM | DIASTOLIC BLOOD PRESSURE: 80 MMHG | WEIGHT: 161 LBS | SYSTOLIC BLOOD PRESSURE: 140 MMHG | OXYGEN SATURATION: 99 %

## 2020-10-13 DIAGNOSIS — E83.39 HYPOPHOSPHATEMIA: ICD-10-CM

## 2020-10-13 DIAGNOSIS — E03.9 HYPOTHYROIDISM, UNSPECIFIED TYPE: ICD-10-CM

## 2020-10-13 DIAGNOSIS — E10.65 UNCONTROLLED TYPE 1 DIABETES MELLITUS WITH HYPERGLYCEMIA (CMD): ICD-10-CM

## 2020-10-13 DIAGNOSIS — E83.42 HYPOMAGNESEMIA: ICD-10-CM

## 2020-10-13 DIAGNOSIS — Z12.5 SCREENING PSA (PROSTATE SPECIFIC ANTIGEN): ICD-10-CM

## 2020-10-13 DIAGNOSIS — J18.9 PNEUMONIA DUE TO INFECTIOUS ORGANISM, UNSPECIFIED LATERALITY, UNSPECIFIED PART OF LUNG: ICD-10-CM

## 2020-10-13 DIAGNOSIS — R20.0 FINGER NUMBNESS: ICD-10-CM

## 2020-10-13 DIAGNOSIS — E78.5 DYSLIPIDEMIA: ICD-10-CM

## 2020-10-13 DIAGNOSIS — E03.9 HYPOTHYROIDISM, UNSPECIFIED: ICD-10-CM

## 2020-10-13 DIAGNOSIS — Z00.00 PREVENTATIVE HEALTH CARE: Primary | ICD-10-CM

## 2020-10-13 DIAGNOSIS — Z12.5 ENCOUNTER FOR SCREENING FOR MALIGNANT NEOPLASM OF PROSTATE: ICD-10-CM

## 2020-10-13 DIAGNOSIS — E78.5 HYPERLIPIDEMIA, UNSPECIFIED: ICD-10-CM

## 2020-10-13 DIAGNOSIS — D64.9 ANEMIA, UNSPECIFIED TYPE: ICD-10-CM

## 2020-10-13 DIAGNOSIS — R93.89 ABNORMAL CHEST X-RAY: ICD-10-CM

## 2020-10-13 DIAGNOSIS — D64.9 ANEMIA, UNSPECIFIED: ICD-10-CM

## 2020-10-13 LAB
ALBUMIN SERPL-MCNC: 4.3 G/DL (ref 3.6–5.1)
ALP SERPL-CCNC: 113 U/L (ref 45–115)
ALT SERPL W/O P-5'-P-CCNC: 17 U/L (ref 5–49)
AST SERPL-CCNC: 23 U/L (ref 14–43)
BASOPHIL %: 0.5 % (ref 0–1.2)
BASOPHIL ABSOLUTE #: 0 10*3/UL (ref 0–0.1)
BILIRUB SERPL-MCNC: 0.6 MG/DL (ref 0–1.3)
BUN SERPL-MCNC: 19 MG/DL (ref 6–27)
CALCIUM SERPL-MCNC: 9.3 MG/DL (ref 8.6–10.6)
CHLORIDE SERPL-SCNC: 107 MMOL/L (ref 96–107)
CHOLEST SERPL-MCNC: 148 MG/DL (ref 140–200)
CO2 SERPL-SCNC: 24 MMOL/L (ref 22–32)
CREAT SERPL-MCNC: 1 MG/DL (ref 0.6–1.6)
DIFFERENTIAL TYPE: ABNORMAL
EOSINOPHIL %: 1.3 % (ref 0–10)
EOSINOPHIL ABSOLUTE #: 0.1 10*3/UL (ref 0–0.5)
EST. AVERAGE GLUCOSE BLD GHB EST-MCNC: 157 MG/DL (ref 0–154)
FERRITIN SERPL-MCNC: 79 NG/ML (ref 18–464)
FOLATE SERPL-MCNC: 9 NG/ML (ref 3–17)
GFR SERPL CREATININE-BSD FRML MDRD: >60 ML/MIN/{1.73M2}
GFR SERPL CREATININE-BSD FRML MDRD: >60 ML/MIN/{1.73M2}
GLUCOSE SERPL-MCNC: 175 MG/DL (ref 70–200)
HBA1C MFR BLD: 7.1 % (ref 4.2–6)
HDLC SERPL-MCNC: 49 MG/DL
HEMATOCRIT: 40 % (ref 40–51)
HEMOGLOBIN: 13.1 G/DL (ref 13.7–17.5)
IMMATURE GRANULOCYTE ABSOLUTE: 0.01 10*3/UL (ref 0–0.05)
IMMATURE GRANULOCYTE PERCENT: 0.2 % (ref 0–0.5)
IRON SATN MFR SERPL: 30 % (ref 13–59)
IRON SERPL-MCNC: 90 UG/DL (ref 49–181)
LDLC SERPL CALC-MCNC: 86 MG/DL (ref 30–100)
LYMPH PERCENT: 18.9 % (ref 20.5–51.1)
LYMPHOCYTE ABSOLUTE #: 1.2 10*3/UL (ref 1.2–3.4)
MAGNESIUM SERPL-MCNC: 2 MG/DL (ref 1.6–2.6)
MEAN CORPUSCULAR HGB CONCENTRATION: 32.8 % (ref 32–36)
MEAN CORPUSCULAR HGB: 30.6 PG (ref 27–34)
MEAN CORPUSCULAR VOLUME: 93.5 FL (ref 79–95)
MEAN PLATELET VOLUME: 10.2 FL (ref 8.6–12.4)
MONOCYTE ABSOLUTE #: 0.5 10*3/UL (ref 0.2–0.9)
MONOCYTE PERCENT: 8 % (ref 4.3–12.9)
NEUTROPHIL ABSOLUTE #: 4.3 10*3/UL (ref 1.4–6.5)
NEUTROPHIL PERCENT: 71.1 % (ref 34–73.5)
PHOSPHATE SERPL-MCNC: 3.9 MG/DL (ref 2.5–4.9)
PLATELET COUNT: 190 10*3/UL (ref 150–400)
POTASSIUM SERPL-SCNC: 4.7 MMOL/L (ref 3.5–5.3)
PROT SERPL-MCNC: 7.4 G/DL (ref 6.4–8.5)
PSA SERPL-MCNC: 0.41 NG/ML (ref 0–4.6)
RED BLOOD CELL COUNT: 4.28 10*6/UL (ref 3.9–5.7)
RED CELL DISTRIBUTION WIDTH: 11.9 % (ref 11.3–14.8)
SODIUM SERPL-SCNC: 140 MMOL/L (ref 136–146)
TIBC SERPL-MCNC: 303 UG/DL (ref 250–450)
TRIGL SERPL-MCNC: 65 MG/DL (ref 0–200)
TSH SERPL DL<=0.05 MIU/L-ACNC: 0.32 M[IU]/L (ref 0.3–4.82)
VIT B12 SERPL-MCNC: 236 PG/ML (ref 193–982)
WHITE BLOOD CELL COUNT: 6.1 10*3/UL (ref 4–10)

## 2020-10-13 PROCEDURE — G0402 INITIAL PREVENTIVE EXAM: HCPCS | Performed by: FAMILY MEDICINE

## 2020-10-13 PROCEDURE — 84443 ASSAY THYROID STIM HORMONE: CPT | Performed by: FAMILY MEDICINE

## 2020-10-13 PROCEDURE — 71046 X-RAY EXAM CHEST 2 VIEWS: CPT | Performed by: RADIOLOGY

## 2020-10-13 PROCEDURE — 83036 HEMOGLOBIN GLYCOSYLATED A1C: CPT | Performed by: FAMILY MEDICINE

## 2020-10-13 PROCEDURE — 85025 COMPLETE CBC W/AUTO DIFF WBC: CPT | Performed by: FAMILY MEDICINE

## 2020-10-13 PROCEDURE — 83735 ASSAY OF MAGNESIUM: CPT | Performed by: FAMILY MEDICINE

## 2020-10-13 PROCEDURE — G0103 PSA SCREENING: HCPCS | Performed by: FAMILY MEDICINE

## 2020-10-13 PROCEDURE — 36415 COLL VENOUS BLD VENIPUNCTURE: CPT | Performed by: FAMILY MEDICINE

## 2020-10-13 PROCEDURE — 84100 ASSAY OF PHOSPHORUS: CPT | Performed by: FAMILY MEDICINE

## 2020-10-13 PROCEDURE — 82607 VITAMIN B-12: CPT | Performed by: FAMILY MEDICINE

## 2020-10-13 PROCEDURE — 80061 LIPID PANEL: CPT | Performed by: FAMILY MEDICINE

## 2020-10-13 PROCEDURE — 80053 COMPREHEN METABOLIC PANEL: CPT | Performed by: FAMILY MEDICINE

## 2020-10-13 PROCEDURE — 82570 ASSAY OF URINE CREATININE: CPT | Performed by: FAMILY MEDICINE

## 2020-10-13 PROCEDURE — 82728 ASSAY OF FERRITIN: CPT | Performed by: FAMILY MEDICINE

## 2020-10-13 PROCEDURE — 83550 IRON BINDING TEST: CPT | Performed by: FAMILY MEDICINE

## 2020-10-13 PROCEDURE — 82746 ASSAY OF FOLIC ACID SERUM: CPT | Performed by: FAMILY MEDICINE

## 2020-10-13 PROCEDURE — 82043 UR ALBUMIN QUANTITATIVE: CPT | Performed by: FAMILY MEDICINE

## 2020-10-13 PROCEDURE — 83540 ASSAY OF IRON: CPT | Performed by: FAMILY MEDICINE

## 2020-10-13 RX ORDER — LEVOTHYROXINE SODIUM 0.15 MG/1
1 TABLET ORAL DAILY
COMMUNITY
Start: 2020-10-11

## 2020-10-13 ASSESSMENT — PATIENT HEALTH QUESTIONNAIRE - PHQ9
SUM OF ALL RESPONSES TO PHQ9 QUESTIONS 1 AND 2: 0
2. FEELING DOWN, DEPRESSED OR HOPELESS: NOT AT ALL
SUM OF ALL RESPONSES TO PHQ9 QUESTIONS 1 AND 2: 0
CLINICAL INTERPRETATION OF PHQ9 SCORE: NO FURTHER SCREENING NEEDED
CLINICAL INTERPRETATION OF PHQ2 SCORE: NO FURTHER SCREENING NEEDED
1. LITTLE INTEREST OR PLEASURE IN DOING THINGS: NOT AT ALL

## 2020-10-14 DIAGNOSIS — E78.5 DYSLIPIDEMIA: ICD-10-CM

## 2020-10-14 DIAGNOSIS — D64.9 ANEMIA, UNSPECIFIED TYPE: Primary | ICD-10-CM

## 2020-10-14 DIAGNOSIS — E10.65 UNCONTROLLED TYPE 1 DIABETES MELLITUS WITH HYPERGLYCEMIA (CMD): ICD-10-CM

## 2020-10-14 LAB
ALBUMIN/CREAT UR: 12.9 MG/G (ref 0–30)
CREAT UR-MCNC: 125.4 MG/DL
MICROALBUMIN UR-MCNC: 16.2 MG/L

## 2020-12-14 ENCOUNTER — OFFICE VISIT (OUTPATIENT)
Dept: PODIATRY | Age: 65
End: 2020-12-14

## 2020-12-14 DIAGNOSIS — Z72.0 TOBACCO CONSUMPTION: ICD-10-CM

## 2020-12-14 DIAGNOSIS — M20.42 HAMMER TOES OF BOTH FEET: ICD-10-CM

## 2020-12-14 DIAGNOSIS — M79.671 BILATERAL FOOT PAIN: ICD-10-CM

## 2020-12-14 DIAGNOSIS — L60.3 NAIL DYSTROPHY: ICD-10-CM

## 2020-12-14 DIAGNOSIS — E11.42 TYPE 2 DIABETES MELLITUS WITH DIABETIC POLYNEUROPATHY, WITH LONG-TERM CURRENT USE OF INSULIN (CMD): Primary | ICD-10-CM

## 2020-12-14 DIAGNOSIS — Z79.4 TYPE 2 DIABETES MELLITUS WITH DIABETIC POLYNEUROPATHY, WITH LONG-TERM CURRENT USE OF INSULIN (CMD): Primary | ICD-10-CM

## 2020-12-14 DIAGNOSIS — M20.41 HAMMER TOES OF BOTH FEET: ICD-10-CM

## 2020-12-14 DIAGNOSIS — M79.672 BILATERAL FOOT PAIN: ICD-10-CM

## 2020-12-14 PROCEDURE — 99214 OFFICE O/P EST MOD 30 MIN: CPT | Performed by: PODIATRIST

## 2020-12-14 RX ORDER — GABAPENTIN 100 MG/1
100 CAPSULE ORAL 2 TIMES DAILY
Qty: 60 CAPSULE | Refills: 3 | Status: SHIPPED | OUTPATIENT
Start: 2020-12-14

## 2020-12-14 SDOH — HEALTH STABILITY: MENTAL HEALTH: HOW OFTEN DO YOU HAVE A DRINK CONTAINING ALCOHOL?: NEVER

## 2021-01-14 ENCOUNTER — APPOINTMENT (OUTPATIENT)
Dept: INTERNAL MEDICINE | Age: 66
End: 2021-01-14

## 2021-03-16 ENCOUNTER — APPOINTMENT (OUTPATIENT)
Dept: PODIATRY | Age: 66
End: 2021-03-16

## 2021-04-29 ENCOUNTER — OFFICE VISIT (OUTPATIENT)
Dept: INTERNAL MEDICINE | Facility: CLINIC | Age: 66
End: 2021-04-29

## 2021-04-29 VITALS
BODY MASS INDEX: 22.75 KG/M2 | TEMPERATURE: 97.8 F | HEART RATE: 77 BPM | DIASTOLIC BLOOD PRESSURE: 58 MMHG | HEIGHT: 72 IN | SYSTOLIC BLOOD PRESSURE: 114 MMHG | WEIGHT: 168 LBS | OXYGEN SATURATION: 98 %

## 2021-04-29 DIAGNOSIS — Z00.00 ENCOUNTER FOR ANNUAL PHYSICAL EXAM: ICD-10-CM

## 2021-04-29 DIAGNOSIS — Z11.59 ENCOUNTER FOR HEPATITIS C SCREENING TEST FOR LOW RISK PATIENT: ICD-10-CM

## 2021-04-29 DIAGNOSIS — H35.379 EPIRETINAL MEMBRANE, UNSPECIFIED LATERALITY: ICD-10-CM

## 2021-04-29 DIAGNOSIS — H35.379 MACULAR PUCKER, UNSPECIFIED LATERALITY: ICD-10-CM

## 2021-04-29 DIAGNOSIS — Z12.5 PROSTATE CANCER SCREENING: ICD-10-CM

## 2021-04-29 DIAGNOSIS — E10.65 CONTROLLED TYPE 1 DIABETES MELLITUS WITH HYPERGLYCEMIA (HCC): Primary | ICD-10-CM

## 2021-04-29 PROBLEM — S98.132A AMPUTATED TOE OF LEFT FOOT: Status: ACTIVE | Noted: 2018-05-16

## 2021-04-29 PROBLEM — R73.9 HYPERGLYCEMIA: Status: ACTIVE | Noted: 2020-04-16

## 2021-04-29 PROBLEM — Z78.9 UNABLE TO CARE FOR SELF: Status: ACTIVE | Noted: 2021-01-21

## 2021-04-29 PROBLEM — R29.6 MULTIPLE FALLS: Status: ACTIVE | Noted: 2021-01-21

## 2021-04-29 PROBLEM — M20.5X9 HL (HALLUX LIMITUS), UNSPECIFIED LATERALITY: Status: ACTIVE | Noted: 2019-02-04

## 2021-04-29 PROBLEM — E10.3393: Status: ACTIVE | Noted: 2018-08-31

## 2021-04-29 PROBLEM — I95.9 HYPOTENSION: Status: ACTIVE | Noted: 2020-04-16

## 2021-04-29 PROBLEM — E86.0 MILD DEHYDRATION: Status: ACTIVE | Noted: 2020-04-16

## 2021-04-29 PROBLEM — M20.40 HAMMER TOE: Status: ACTIVE | Noted: 2019-05-28

## 2021-04-29 PROBLEM — F17.210 CIGARETTE SMOKER: Status: ACTIVE | Noted: 2020-04-16

## 2021-04-29 PROBLEM — E03.9 HYPOTHYROIDISM: Status: ACTIVE | Noted: 2018-08-31

## 2021-04-29 PROBLEM — E08.42 DIABETIC POLYNEUROPATHY ASSOCIATED WITH DIABETES MELLITUS DUE TO UNDERLYING CONDITION: Status: ACTIVE | Noted: 2021-01-21

## 2021-04-29 PROCEDURE — 99204 OFFICE O/P NEW MOD 45 MIN: CPT | Performed by: NURSE PRACTITIONER

## 2021-04-29 RX ORDER — INSULIN GLARGINE 100 [IU]/ML
9 INJECTION, SOLUTION SUBCUTANEOUS NIGHTLY
COMMUNITY
End: 2021-05-21 | Stop reason: SDUPTHER

## 2021-04-29 RX ORDER — IBUPROFEN 200 MG
600 TABLET ORAL EVERY 6 HOURS PRN
COMMUNITY
Start: 2021-01-22 | End: 2021-05-21 | Stop reason: SDUPTHER

## 2021-04-29 RX ORDER — INSULIN ASPART 100 [IU]/ML
4 INJECTION, SOLUTION INTRAVENOUS; SUBCUTANEOUS 3 TIMES DAILY
COMMUNITY
End: 2021-05-21 | Stop reason: SDUPTHER

## 2021-04-29 RX ORDER — LEVOTHYROXINE SODIUM 137 UG/1
137 TABLET ORAL DAILY
COMMUNITY
End: 2021-04-30

## 2021-04-29 RX ORDER — ACETAMINOPHEN 325 MG/1
650 TABLET ORAL EVERY 4 HOURS PRN
COMMUNITY
Start: 2021-01-22 | End: 2021-05-21 | Stop reason: SDUPTHER

## 2021-04-29 NOTE — PROGRESS NOTES
Chief Complaint  Establish Care (Blood sugar now at 189- Dexcom) and Annual Exam    Subjective          Javan Colbert presents to [facility]  Mr. Colbert is a 66 yo male who present to the office this morning to establish care. He is currently living at [facility] and is followed by Dr. Puente. He moved here recently from [place], [place] in December. Patient's past medical history includes diabetes type 1, polyneuropathy, hypothyroidism, hypokalemia, and episodes of hypotension. Regarding his Diabetes Type 1 management-He was followed by a Endocrinologist that was controlling his blood sugar with DEXCOM monitoring system, short and long acting insulin. He reports that he is currently having small adjustments to his Lantus and evening insulins and reports lower blood sugar readings around 4-6 am. Lowest reading on Dexcom was 40-45 mg/dl. He reports then having to take in orange juice or sugary snacks to get blood sugar back up and blood sugar in the morning are running 200-300 range. In the office this morning blood sugar reading was 170-189. He reports numerous ER visits related to hyperglycemia to DKA with thalamic stroke. Patient reports that one ER admission blood sugar was in 1000's range and he had a stroke and went into a coma for 4 days. Since this appointment his was started on Sliding scale insulin added to his set meal time insulin dosage.     He is currently seeing podiatrist in nursing home and reported diabetic foot check yesterday was completed without acute issues. He reports intermittent issues with blurred vision, worse on the right side. He was diagnosed with macular pucker and managed without surgical intervention.     He reports that he completing his COVID vaccine at the previous nursing home, which was required prior to his nursing home transfer. He denies history of COVID contraction. He denies recent exposure to COVID positive persons and denies  "COVID symptoms at visit today.    Javan Colbert  reports that he has been smoking cigarettes. He has never used smokeless tobacco.. I have educated him on the risk of diseases from using tobacco products such as cancer, COPD and heart disease.     I advised him to quit and he is not willing to quit.    I spent 5 minutes counseling the patient.         Objective   Vital Signs:   /58 (BP Location: Right arm)   Pulse 77   Temp 97.8 °F (36.6 °C)   Ht 182.9 cm (72\")   Wt 76.2 kg (168 lb)   SpO2 98%   BMI 22.78 kg/m²     Physical Exam  Vitals and nursing note reviewed.   Constitutional:       Appearance: Normal appearance. He is well-developed and normal weight.   HENT:      Head: Normocephalic and atraumatic.      Right Ear: Hearing, tympanic membrane, ear canal and external ear normal.      Left Ear: Hearing, tympanic membrane, ear canal and external ear normal.      Nose: Nose normal.      Mouth/Throat:      Lips: Pink.      Mouth: Mucous membranes are moist.      Pharynx: Oropharynx is clear. Uvula midline.   Eyes:      General: Lids are normal. Lids are everted, no foreign bodies appreciated. Vision grossly intact.      Extraocular Movements: Extraocular movements intact.      Conjunctiva/sclera: Conjunctivae normal.      Pupils: Pupils are equal, round, and reactive to light.   Neck:      Thyroid: No thyroid mass, thyromegaly or thyroid tenderness.   Cardiovascular:      Rate and Rhythm: Normal rate and regular rhythm.      Pulses: Normal pulses.           Dorsalis pedis pulses are 2+ on the right side and 2+ on the left side.        Posterior tibial pulses are 2+ on the right side and 2+ on the left side.      Heart sounds: Normal heart sounds.   Pulmonary:      Effort: Pulmonary effort is normal.      Breath sounds: Normal breath sounds.   Abdominal:      General: Abdomen is flat. Bowel sounds are normal.      Palpations: Abdomen is soft.      Tenderness: There is no abdominal tenderness. There is no " right CVA tenderness, left CVA tenderness or guarding.   Musculoskeletal:      Cervical back: Normal range of motion and neck supple.      Right lower leg: No edema.      Left lower leg: No edema.        Feet:    Feet:      Right foot:      Protective Sensation: 5 sites tested. 1 site sensed.     Toenail Condition: Right toenails are abnormally thick.      Left foot:      Protective Sensation: 5 sites tested. 0 sites sensed.      Toenail Condition: Left toenails are abnormally thick.   Lymphadenopathy:      Head:      Right side of head: No submental, submandibular, tonsillar, preauricular, posterior auricular or occipital adenopathy.      Left side of head: No submental, submandibular, tonsillar, preauricular, posterior auricular or occipital adenopathy.      Cervical: No cervical adenopathy.   Skin:     General: Skin is warm and dry.      Capillary Refill: Capillary refill takes less than 2 seconds.      Findings: No bruising, erythema, rash or wound.   Neurological:      Mental Status: He is alert and oriented to person, place, and time.      Cranial Nerves: Cranial nerves are intact.      Sensory: Sensory deficit present.      Motor: Weakness present.      Coordination: Coordination is intact.      Gait: Gait abnormal.      Deep Tendon Reflexes:      Reflex Scores:       Tricep reflexes are 2+ on the right side and 2+ on the left side.       Bicep reflexes are 2+ on the right side and 2+ on the left side.       Patellar reflexes are 2+ on the right side and 2+ on the left side.     Comments: Decreased sensation  L>R and decreased strength L>R. Left upper extremity 4/5 and left lower extremity 3/5. Right upper and lower extremity 5/5/ strength   Psychiatric:         Attention and Perception: Attention normal.         Mood and Affect: Mood normal.         Speech: Speech normal.         Behavior: Behavior normal. Behavior is cooperative.         Thought Content: Thought content normal.         Cognition and Memory:  Cognition normal. He exhibits impaired recent memory.         Judgment: Judgment normal.        Result Review :   The following data was reviewed by: ELANA Copeland on 04/29/2021:  Common labs    Common Labsle 1/21/21 1/22/21 2/21/21   Glucose 353 (A) 120 (A) 358 (A)   BUN 31 (A) 31 (A) 29 (A)   Creatinine 1.36 (A) 1.09 1.21   eGFR Non African Am 52 (A) >60 60   eGFR  Am >60 >60 >60   Sodium 138 141 135 (A)   Potassium 4.5 4.5 4.5   Chloride 106 110 (A) 105   Calcium 9.0 8.4 (A) 8.5 (A)   Albumin 3.9  3.4 (A)   Total Bilirubin 0.3  0.4   Alkaline Phosphatase 110  103   AST (SGOT) 16  15   ALT (SGPT) 20  18   (A) Abnormal value       Comments are available for some flowsheets but are not being displayed.           Data reviewed: Recent hospitalization notes from 4/2020; 9/2020; 12/2020; 1/2021 related to hyperglycemia diabetes type 1 and thalamic stroke with COMA 4 days          Assessment and Plan    Diagnoses and all orders for this visit:    1. Controlled type 1 diabetes mellitus with hyperglycemia (CMS/HCC) (Primary)  -     Hemoglobin A1c  -     Ambulatory Referral to Endocrinology  -     Ambulatory Referral for Diabetic Eye Exam-Ophthalmology    2. Encounter for hepatitis C screening test for low risk patient  -     Hepatitis C Antibody    3. Encounter for annual physical exam  -     Comprehensive Metabolic Panel  -     Lipid Panel  -     CBC & Differential  -     Uric Acid  -     TSH  -     Urinalysis With Microscopic If Indicated (No Culture) - Urine, Clean Catch    4. Epiretinal membrane, unspecified laterality    5. Macular pucker, unspecified laterality  -     Ambulatory Referral for Diabetic Eye Exam-Ophthalmology    6. Prostate cancer screening  -     PSA Screen      I spent 60 minutes caring for Javan on this date of service. This time includes time spent by me in the following activities:preparing for the visit, reviewing tests, obtaining and/or reviewing a separately obtained history,  performing a medically appropriate examination and/or evaluation , counseling and educating the patient/family/caregiver, ordering medications, tests, or procedures, documenting information in the medical record, independently interpreting results and communicating that information with the patient/family/caregiver and care coordination     Follow Up     Return in about 4 weeks (around 5/27/2021) for Next scheduled follow up from establishment of care.     Will check with [facility] to determine if further evaluation and management of care is necessary through our office. Will continue him on current insulin regimen at this time. This has been adjusted by Dr. Kwame Villegas at the facility. Will refer to Endocrinology per patient's preference. Will refer also to opthamologist for regular diabetic eye exams. Will complete yearly labs today including PSA. Patient denies immunizations at this time. Will re discuss at next scheduled follow up.     Patient was given instructions and counseling regarding his condition or for health maintenance advice. Please see specific information pulled into the AVS if appropriate.

## 2021-04-29 NOTE — PATIENT INSTRUCTIONS
Diabetes Mellitus and Nutrition, Adult  When you have diabetes, or diabetes mellitus, it is very important to have healthy eating habits because your blood sugar (glucose) levels are greatly affected by what you eat and drink. Eating healthy foods in the right amounts, at about the same times every day, can help you:  · Control your blood glucose.  · Lower your risk of heart disease.  · Improve your blood pressure.  · Reach or maintain a healthy weight.  What can affect my meal plan?  Every person with diabetes is different, and each person has different needs for a meal plan. Your health care provider may recommend that you work with a dietitian to make a meal plan that is best for you. Your meal plan may vary depending on factors such as:  · The calories you need.  · The medicines you take.  · Your weight.  · Your blood glucose, blood pressure, and cholesterol levels.  · Your activity level.  · Other health conditions you have, such as heart or kidney disease.  How do carbohydrates affect me?  Carbohydrates, also called carbs, affect your blood glucose level more than any other type of food. Eating carbs naturally raises the amount of glucose in your blood. Carb counting is a method for keeping track of how many carbs you eat. Counting carbs is important to keep your blood glucose at a healthy level, especially if you use insulin or take certain oral diabetes medicines.  It is important to know how many carbs you can safely have in each meal. This is different for every person. Your dietitian can help you calculate how many carbs you should have at each meal and for each snack.  How does alcohol affect me?  Alcohol can cause a sudden decrease in blood glucose (hypoglycemia), especially if you use insulin or take certain oral diabetes medicines. Hypoglycemia can be a life-threatening condition. Symptoms of hypoglycemia, such as sleepiness, dizziness, and confusion, are similar to symptoms of having too much  "alcohol.  · Do not drink alcohol if:  ? Your health care provider tells you not to drink.  ? You are pregnant, may be pregnant, or are planning to become pregnant.  · If you drink alcohol:  ? Do not drink on an empty stomach.  ? Limit how much you use to:  § 0-1 drink a day for women.  § 0-2 drinks a day for men.  ? Be aware of how much alcohol is in your drink. In the [place] one drink equals one 12 oz bottle of beer (355 mL), one 5 oz glass of wine (148 mL), or one 1½ oz glass of hard liquor (44 mL).  ? Keep yourself hydrated with water, diet soda, or unsweetened iced tea.  § Keep in mind that regular soda, juice, and other mixers may contain a lot of sugar and must be counted as carbs.  What are tips for following this plan?    Reading food labels  · Start by checking the serving size on the \"Nutrition Facts\" label of packaged foods and drinks. The amount of calories, carbs, fats, and other nutrients listed on the label is based on one serving of the item. Many items contain more than one serving per package.  · Check the total grams (g) of carbs in one serving. You can calculate the number of servings of carbs in one serving by dividing the total carbs by 15. For example, if a food has 30 g of total carbs per serving, it would be equal to 2 servings of carbs.  · Check the number of grams (g) of saturated fats and trans fats in one serving. Choose foods that have a low amount or none of these fats.  · Check the number of milligrams (mg) of salt (sodium) in one serving. Most people should limit total sodium intake to less than 2,300 mg per day.  · Always check the nutrition information of foods labeled as \"low-fat\" or \"nonfat.\" These foods may be higher in added sugar or refined carbs and should be avoided.  · Talk to your dietitian to identify your daily goals for nutrients listed on the label.  Shopping  · Avoid buying canned, pre-made, or processed foods. These foods tend to be high in fat, sodium, and added " sugar.  · Shop around the outside edge of the grocery store. This is where you will most often find fresh fruits and vegetables, bulk grains, fresh meats, and fresh dairy.  Cooking  · Use low-heat cooking methods, such as baking, instead of high-heat cooking methods like deep frying.  · Cook using healthy oils, such as olive, canola, or sunflower oil.  · Avoid cooking with butter, cream, or high-fat meats.  Meal planning  · Eat meals and snacks regularly, preferably at the same times every day. Avoid going long periods of time without eating.  · Eat foods that are high in fiber, such as fresh fruits, vegetables, beans, and whole grains. Talk with your dietitian about how many servings of carbs you can eat at each meal.  · Eat 4-6 oz (112-168 g) of lean protein each day, such as lean meat, chicken, fish, eggs, or tofu. One ounce (oz) of lean protein is equal to:  ? 1 oz (28 g) of meat, chicken, or fish.  ? 1 egg.  ? ¼ cup (62 g) of tofu.  · Eat some foods each day that contain healthy fats, such as avocado, nuts, seeds, and fish.  What foods should I eat?  Fruits  Berries. Apples. Oranges. Peaches. Apricots. Plums. Grapes. Ran. Papaya. Pomegranate. Kiwi. Cherries.  Vegetables  Lettuce. Spinach. Leafy greens, including kale, chard, carla greens, and mustard greens. Beets. Cauliflower. Cabbage. Broccoli. Carrots. Green beans. Tomatoes. Peppers. Onions. Cucumbers. [place] sprouts.  Grains  Whole grains, such as whole-wheat or whole-grain bread, crackers, tortillas, cereal, and pasta. Unsweetened oatmeal. Quinoa. Brown or wild rice.  Meats and other proteins  Seafood. Poultry without skin. Lean cuts of poultry and beef. Tofu. Nuts. Seeds.  Dairy  Low-fat or fat-free dairy products such as milk, yogurt, and cheese.  The items listed above may not be a complete list of foods and beverages you can eat. Contact a dietitian for more information.  What foods should I avoid?  Fruits  Fruits canned with  syrup.  Vegetables  Canned vegetables. Frozen vegetables with butter or cream sauce.  Grains  Refined white flour and flour products such as bread, pasta, snack foods, and cereals. Avoid all processed foods.  Meats and other proteins  Fatty cuts of meat. Poultry with skin. Breaded or fried meats. Processed meat. Avoid saturated fats.  Dairy  Full-fat yogurt, cheese, or milk.  Beverages  Sweetened drinks, such as soda or iced tea.  The items listed above may not be a complete list of foods and beverages you should avoid. Contact a dietitian for more information.  Questions to ask a health care provider  · Do I need to meet with a diabetes educator?  · Do I need to meet with a dietitian?  · What number can I call if I have questions?  · When are the best times to check my blood glucose?  Where to find more information:  · American Diabetes Association: diabetes.org  · Academy of Nutrition and Dietetics: www.eatright.org  · National [place] of Diabetes and Digestive and Kidney Diseases: www.niddk.nih.gov  · Association of Diabetes Care and Education Specialists: www.diabeteseducator.org  Summary  · It is important to have healthy eating habits because your blood sugar (glucose) levels are greatly affected by what you eat and drink.  · A healthy meal plan will help you control your blood glucose and maintain a healthy lifestyle.  · Your health care provider may recommend that you work with a dietitian to make a meal plan that is best for you.  · Keep in mind that carbohydrates (carbs) and alcohol have immediate effects on your blood glucose levels. It is important to count carbs and to use alcohol carefully.  This information is not intended to replace advice given to you by your health care provider. Make sure you discuss any questions you have with your health care provider.  Document Revised: 11/24/2020 Document Reviewed: 11/24/2020  Elsevier Patient Education © 2021 Elsevier Inc.    Diabetes Mellitus and Standards  of Medical Care  Managing diabetes (diabetes mellitus) can be complicated. Your diabetes treatment may be managed by a team of health care providers, including:  · A physician who specializes in diabetes (endocrinologist).  · A nurse practitioner or physician assistant.  · Nurses.  · A diet and nutrition specialist (registered dietitian).  · A certified diabetes educator (CDE).  · An exercise specialist.  · A pharmacist.  · An eye doctor.  · A foot specialist (podiatrist).  · A dentist.  · A primary care provider.  · A mental health provider.  Your health care providers follow guidelines to help you get the best quality of care. The following schedule is a general guideline for your diabetes management plan. Your health care providers may give you more specific instructions.  Physical exams  Upon being diagnosed with diabetes mellitus, and each year after that, your health care provider will ask about your medical and family history. He or she will also do a physical exam. Your exam may include:  · Measuring your height, weight, and body mass index (BMI).  · Checking your blood pressure. This will be done at every routine medical visit. Your target blood pressure may vary depending on your medical conditions, your age, and other factors.  · Thyroid gland exam.  · Skin exam.  · Screening for damage to your nerves (peripheral neuropathy). This may include checking the pulse in your legs and feet and checking the level of sensation in your hands and feet.  · A complete foot exam to inspect the structure and skin of your feet, including checking for cuts, bruises, redness, blisters, sores, or other problems.  · Screening for blood vessel (vascular) problems, which may include checking the pulse in your legs and feet and checking your temperature.  Blood tests  Depending on your treatment plan and your personal needs, you may have the following tests done:  · HbA1c (hemoglobin A1c). This test provides information about  blood sugar (glucose) control over the previous 2-3 months. It is used to adjust your treatment plan, if needed. This test will be done:  ? At least 2 times a year, if you are meeting your treatment goals.  ? 4 times a year, if you are not meeting your treatment goals or if treatment goals have changed.  · Lipid testing, including total, LDL, and HDL cholesterol and triglyceride levels.  ? The goal for LDL is less than 100 mg/dL (5.5 mmol/L). If you are at high risk for complications, the goal is less than 70 mg/dL (3.9 mmol/L).  ? The goal for HDL is 40 mg/dL (2.2 mmol/L) or higher for men and 50 mg/dL (2.8 mmol/L) or higher for women. An HDL cholesterol of 60 mg/dL (3.3 mmol/L) or higher gives some protection against heart disease.  ? The goal for triglycerides is less than 150 mg/dL (8.3 mmol/L).  · Liver function tests.  · Kidney function tests.  · Thyroid function tests.  Dental and eye exams  · Visit your dentist two times a year.  · If you have type 1 diabetes, your health care provider may recommend an eye exam 3-5 years after you are diagnosed, and then once a year after your first exam.  ? For children with type 1 diabetes, a health care provider may recommend an eye exam when your child is age 10 or older and has had diabetes for 3-5 years. After the first exam, your child should get an eye exam once a year.  · If you have type 2 diabetes, your health care provider may recommend an eye exam as soon as you are diagnosed, and then once a year after your first exam.  Immunizations    · The yearly flu (influenza) vaccine is recommended for everyone 6 months or older who has diabetes.  · The pneumonia (pneumococcal) vaccine is recommended for everyone 2 years or older who has diabetes. If you are 65 or older, you may get the pneumonia vaccine as a series of two separate shots.  · The hepatitis B vaccine is recommended for adults shortly after being diagnosed with diabetes.  · Adults and children with diabetes  should receive all other vaccines according to age-specific recommendations from the Centers for Disease Control and Prevention (CDC).  Mental and emotional health  Screening for symptoms of eating disorders, anxiety, and depression is recommended at the time of diagnosis and afterward as needed. If your screening shows that you have symptoms (positive screening result), you may need more evaluation and you may work with a mental health care provider.  Treatment plan  Your treatment plan will be reviewed at every medical visit. You and your health care provider will discuss:  · How you are taking your medicines, including insulin.  · Any side effects you are experiencing.  · Your blood glucose target goals.  · The frequency of your blood glucose monitoring.  · Lifestyle habits, such as activity level as well as tobacco, alcohol, and substance use.  Diabetes self-management education  Your health care provider will assess how well you are monitoring your blood glucose levels and whether you are taking your insulin correctly. He or she may refer you to:  · A certified diabetes educator to manage your diabetes throughout your life, starting at diagnosis.  · A registered dietitian who can create or review your personal nutrition plan.  · An exercise specialist who can discuss your activity level and exercise plan.  Summary  · Managing diabetes (diabetes mellitus) can be complicated. Your diabetes treatment may be managed by a team of health care providers.  · Your health care providers follow guidelines in order to help you get the best quality of care.  · Standards of care including having regular physical exams, blood tests, blood pressure monitoring, immunizations, screening tests, and education about how to manage your diabetes.  · Your health care providers may also give you more specific instructions based on your individual health.  This information is not intended to replace advice given to you by your health  care provider. Make sure you discuss any questions you have with your health care provider.  Document Revised: 09/06/2019 Document Reviewed: 09/15/2017  Elsevier Patient Education © 2020 Elsevier Inc.

## 2021-04-30 DIAGNOSIS — E03.9 ACQUIRED HYPOTHYROIDISM: Primary | ICD-10-CM

## 2021-04-30 LAB
ALBUMIN SERPL-MCNC: 4.7 G/DL (ref 3.5–5.2)
ALBUMIN/GLOB SERPL: 2 G/DL
ALP SERPL-CCNC: 95 U/L (ref 39–117)
ALT SERPL-CCNC: 22 U/L (ref 1–41)
APPEARANCE UR: CLEAR
AST SERPL-CCNC: 16 U/L (ref 1–40)
BASOPHILS # BLD AUTO: 0.07 10*3/MM3 (ref 0–0.2)
BASOPHILS NFR BLD AUTO: 1.2 % (ref 0–1.5)
BILIRUB SERPL-MCNC: 0.4 MG/DL (ref 0–1.2)
BILIRUB UR QL STRIP: NEGATIVE
BUN SERPL-MCNC: 28 MG/DL (ref 8–23)
BUN/CREAT SERPL: 25 (ref 7–25)
CALCIUM SERPL-MCNC: 10.1 MG/DL (ref 8.6–10.5)
CHLORIDE SERPL-SCNC: 102 MMOL/L (ref 98–107)
CHOLEST SERPL-MCNC: 166 MG/DL (ref 0–200)
CO2 SERPL-SCNC: 27.9 MMOL/L (ref 22–29)
COLOR UR: YELLOW
CREAT SERPL-MCNC: 1.12 MG/DL (ref 0.76–1.27)
EOSINOPHIL # BLD AUTO: 0.21 10*3/MM3 (ref 0–0.4)
EOSINOPHIL NFR BLD AUTO: 3.5 % (ref 0.3–6.2)
ERYTHROCYTE [DISTWIDTH] IN BLOOD BY AUTOMATED COUNT: 12.2 % (ref 12.3–15.4)
GLOBULIN SER CALC-MCNC: 2.3 GM/DL
GLUCOSE SERPL-MCNC: 216 MG/DL (ref 65–99)
GLUCOSE UR QL: ABNORMAL
HBA1C MFR BLD: 7.9 % (ref 4.8–5.6)
HCT VFR BLD AUTO: 39.4 % (ref 37.5–51)
HCV AB S/CO SERPL IA: <0.1 S/CO RATIO (ref 0–0.9)
HDLC SERPL-MCNC: 65 MG/DL (ref 40–60)
HGB BLD-MCNC: 12.8 G/DL (ref 13–17.7)
HGB UR QL STRIP: NEGATIVE
IMM GRANULOCYTES # BLD AUTO: 0.01 10*3/MM3 (ref 0–0.05)
IMM GRANULOCYTES NFR BLD AUTO: 0.2 % (ref 0–0.5)
KETONES UR QL STRIP: ABNORMAL
LDLC SERPL CALC-MCNC: 86 MG/DL (ref 0–100)
LEUKOCYTE ESTERASE UR QL STRIP: NEGATIVE
LYMPHOCYTES # BLD AUTO: 1.86 10*3/MM3 (ref 0.7–3.1)
LYMPHOCYTES NFR BLD AUTO: 30.7 % (ref 19.6–45.3)
MCH RBC QN AUTO: 31.6 PG (ref 26.6–33)
MCHC RBC AUTO-ENTMCNC: 32.5 G/DL (ref 31.5–35.7)
MCV RBC AUTO: 97.3 FL (ref 79–97)
MONOCYTES # BLD AUTO: 0.51 10*3/MM3 (ref 0.1–0.9)
MONOCYTES NFR BLD AUTO: 8.4 % (ref 5–12)
NEUTROPHILS # BLD AUTO: 3.4 10*3/MM3 (ref 1.7–7)
NEUTROPHILS NFR BLD AUTO: 56 % (ref 42.7–76)
NITRITE UR QL STRIP: NEGATIVE
NRBC BLD AUTO-RTO: 0 /100 WBC (ref 0–0.2)
PH UR STRIP: 6 [PH] (ref 5–8)
PLATELET # BLD AUTO: 158 10*3/MM3 (ref 140–450)
POTASSIUM SERPL-SCNC: 5.4 MMOL/L (ref 3.5–5.2)
PROT SERPL-MCNC: 7 G/DL (ref 6–8.5)
PROT UR QL STRIP: NEGATIVE
PSA SERPL-MCNC: 0.37 NG/ML (ref 0–4)
RBC # BLD AUTO: 4.05 10*6/MM3 (ref 4.14–5.8)
SODIUM SERPL-SCNC: 139 MMOL/L (ref 136–145)
SP GR UR: 1.02 (ref 1–1.03)
TRIGL SERPL-MCNC: 78 MG/DL (ref 0–150)
TSH SERPL DL<=0.005 MIU/L-ACNC: 20.1 UIU/ML (ref 0.27–4.2)
URATE SERPL-MCNC: 5.7 MG/DL (ref 3.4–7)
UROBILINOGEN UR STRIP-MCNC: ABNORMAL MG/DL
VLDLC SERPL CALC-MCNC: 15 MG/DL (ref 5–40)
WBC # BLD AUTO: 6.06 10*3/MM3 (ref 3.4–10.8)

## 2021-05-03 RX ORDER — LEVOTHYROXINE SODIUM 0.15 MG/1
150 TABLET ORAL DAILY
Qty: 90 TABLET | Refills: 1 | Status: SHIPPED | OUTPATIENT
Start: 2021-05-03 | End: 2021-07-23 | Stop reason: SDUPTHER

## 2021-05-03 RX ORDER — LEVOTHYROXINE SODIUM 112 UG/1
112 TABLET ORAL DAILY
Qty: 90 TABLET | Refills: 1 | OUTPATIENT
Start: 2021-05-03

## 2021-05-21 DIAGNOSIS — E10.3393 TYPE 1 DIABETES MELLITUS WITH MODERATE NONPROLIFERATIVE RETINOPATHY OF BOTH EYES WITHOUT MACULAR EDEMA (HCC): Primary | ICD-10-CM

## 2021-05-21 DIAGNOSIS — E03.9 ACQUIRED HYPOTHYROIDISM: ICD-10-CM

## 2021-05-21 RX ORDER — INSULIN GLARGINE 100 [IU]/ML
9 INJECTION, SOLUTION SUBCUTANEOUS NIGHTLY
Qty: 3 PEN | Refills: 3 | Status: SHIPPED | OUTPATIENT
Start: 2021-05-21 | End: 2021-07-23 | Stop reason: SDUPTHER

## 2021-05-21 RX ORDER — IBUPROFEN 200 MG
600 TABLET ORAL EVERY 6 HOURS PRN
Qty: 500 TABLET | Refills: 3 | Status: SHIPPED | OUTPATIENT
Start: 2021-05-21 | End: 2021-07-23 | Stop reason: SDUPTHER

## 2021-05-21 RX ORDER — POTASSIUM CHLORIDE 20 MEQ/1
20 TABLET, EXTENDED RELEASE ORAL DAILY
Qty: 90 TABLET | Refills: 3 | Status: SHIPPED | OUTPATIENT
Start: 2021-05-21 | End: 2021-07-23 | Stop reason: SDUPTHER

## 2021-05-21 RX ORDER — LEVOTHYROXINE SODIUM 0.15 MG/1
150 TABLET ORAL DAILY
Qty: 90 TABLET | Refills: 1 | Status: CANCELLED | OUTPATIENT
Start: 2021-05-21

## 2021-05-21 RX ORDER — PROCHLORPERAZINE 25 MG/1
1 SUPPOSITORY RECTAL TAKE AS DIRECTED
Qty: 1 EACH | Refills: 0 | Status: SHIPPED | OUTPATIENT
Start: 2021-05-21 | End: 2022-02-28 | Stop reason: SDUPTHER

## 2021-05-21 RX ORDER — ACETAMINOPHEN 325 MG/1
650 TABLET ORAL EVERY 4 HOURS PRN
Qty: 500 TABLET | Refills: 3 | Status: SHIPPED | OUTPATIENT
Start: 2021-05-21 | End: 2021-07-23 | Stop reason: SDUPTHER

## 2021-05-21 RX ORDER — INSULIN ASPART 100 [IU]/ML
4 INJECTION, SOLUTION INTRAVENOUS; SUBCUTANEOUS 3 TIMES DAILY
Qty: 6 PEN | Refills: 3 | Status: SHIPPED | OUTPATIENT
Start: 2021-05-21 | End: 2021-07-23 | Stop reason: SDUPTHER

## 2021-05-21 RX ORDER — PROCHLORPERAZINE 25 MG/1
SUPPOSITORY RECTAL
Qty: 9 EACH | Refills: 3 | Status: SHIPPED | OUTPATIENT
Start: 2021-05-21 | End: 2021-07-23 | Stop reason: SDUPTHER

## 2021-05-21 NOTE — TELEPHONE ENCOUNTER
Old Dr will not write script any longer.  Needing scripts written      klor-con 20mg 1 daily    Using Dexacon sensor

## 2021-05-24 ENCOUNTER — APPOINTMENT (OUTPATIENT)
Dept: CT IMAGING | Facility: HOSPITAL | Age: 66
End: 2021-05-24

## 2021-05-24 ENCOUNTER — HOSPITAL ENCOUNTER (EMERGENCY)
Facility: HOSPITAL | Age: 66
Discharge: HOME | End: 2021-05-25
Attending: EMERGENCY MEDICINE | Admitting: EMERGENCY MEDICINE

## 2021-05-24 DIAGNOSIS — N40.0 ENLARGED PROSTATE: ICD-10-CM

## 2021-05-24 DIAGNOSIS — N20.0 KIDNEY STONE: Primary | ICD-10-CM

## 2021-05-24 DIAGNOSIS — R33.9 URINARY RETENTION: ICD-10-CM

## 2021-05-24 DIAGNOSIS — K59.00 CONSTIPATION, UNSPECIFIED CONSTIPATION TYPE: ICD-10-CM

## 2021-05-24 LAB
ANION GAP SERPL CALCULATED.3IONS-SCNC: 9 MMOL/L (ref 5–15)
APTT PPP: 27.8 SECONDS (ref 24.1–35)
BASOPHILS # BLD AUTO: 0.06 10*3/MM3 (ref 0–0.2)
BASOPHILS NFR BLD AUTO: 0.9 % (ref 0–1.5)
BILIRUB UR QL STRIP: NEGATIVE
BUN SERPL-MCNC: 25 MG/DL (ref 8–23)
BUN/CREAT SERPL: 24 (ref 7–25)
CALCIUM SPEC-SCNC: 9.6 MG/DL (ref 8.6–10.5)
CHLORIDE SERPL-SCNC: 105 MMOL/L (ref 98–107)
CLARITY UR: CLEAR
CO2 SERPL-SCNC: 26 MMOL/L (ref 22–29)
COLOR UR: YELLOW
CREAT SERPL-MCNC: 1.04 MG/DL (ref 0.76–1.27)
DEPRECATED RDW RBC AUTO: 41 FL (ref 37–54)
EOSINOPHIL # BLD AUTO: 0.24 10*3/MM3 (ref 0–0.4)
EOSINOPHIL NFR BLD AUTO: 3.6 % (ref 0.3–6.2)
ERYTHROCYTE [DISTWIDTH] IN BLOOD BY AUTOMATED COUNT: 12.1 % (ref 12.3–15.4)
GFR SERPL CREATININE-BSD FRML MDRD: 71 ML/MIN/1.73
GLUCOSE SERPL-MCNC: 164 MG/DL (ref 65–99)
GLUCOSE UR STRIP-MCNC: ABNORMAL MG/DL
HCT VFR BLD AUTO: 37.8 % (ref 37.5–51)
HGB BLD-MCNC: 12.7 G/DL (ref 13–17.7)
HGB UR QL STRIP.AUTO: NEGATIVE
HOLD SPECIMEN: NORMAL
HOLD SPECIMEN: NORMAL
IMM GRANULOCYTES # BLD AUTO: 0.01 10*3/MM3 (ref 0–0.05)
IMM GRANULOCYTES NFR BLD AUTO: 0.2 % (ref 0–0.5)
INR PPP: 1.03 (ref 0.91–1.09)
KETONES UR QL STRIP: NEGATIVE
LEUKOCYTE ESTERASE UR QL STRIP.AUTO: NEGATIVE
LYMPHOCYTES # BLD AUTO: 1.95 10*3/MM3 (ref 0.7–3.1)
LYMPHOCYTES NFR BLD AUTO: 29.3 % (ref 19.6–45.3)
MCH RBC QN AUTO: 31 PG (ref 26.6–33)
MCHC RBC AUTO-ENTMCNC: 33.6 G/DL (ref 31.5–35.7)
MCV RBC AUTO: 92.2 FL (ref 79–97)
MONOCYTES # BLD AUTO: 0.64 10*3/MM3 (ref 0.1–0.9)
MONOCYTES NFR BLD AUTO: 9.6 % (ref 5–12)
NEUTROPHILS NFR BLD AUTO: 3.76 10*3/MM3 (ref 1.7–7)
NEUTROPHILS NFR BLD AUTO: 56.4 % (ref 42.7–76)
NITRITE UR QL STRIP: NEGATIVE
NRBC BLD AUTO-RTO: 0 /100 WBC (ref 0–0.2)
PH UR STRIP.AUTO: 6 [PH] (ref 5–8)
PLATELET # BLD AUTO: 159 10*3/MM3 (ref 140–450)
PMV BLD AUTO: 10 FL (ref 6–12)
POTASSIUM SERPL-SCNC: 5.1 MMOL/L (ref 3.5–5.2)
PROT UR QL STRIP: NEGATIVE
PROTHROMBIN TIME: 12.7 SECONDS (ref 11.5–13.4)
RBC # BLD AUTO: 4.1 10*6/MM3 (ref 4.14–5.8)
SODIUM SERPL-SCNC: 140 MMOL/L (ref 136–145)
SP GR UR STRIP: 1.03 (ref 1–1.03)
UROBILINOGEN UR QL STRIP: ABNORMAL
WBC # BLD AUTO: 6.66 10*3/MM3 (ref 3.4–10.8)
WHOLE BLOOD HOLD SPECIMEN: NORMAL
WHOLE BLOOD HOLD SPECIMEN: NORMAL

## 2021-05-24 PROCEDURE — 74177 CT ABD & PELVIS W/CONTRAST: CPT

## 2021-05-24 PROCEDURE — 25010000002 IOPAMIDOL 61 % SOLUTION: Performed by: EMERGENCY MEDICINE

## 2021-05-24 PROCEDURE — 51798 US URINE CAPACITY MEASURE: CPT

## 2021-05-24 PROCEDURE — 81003 URINALYSIS AUTO W/O SCOPE: CPT | Performed by: EMERGENCY MEDICINE

## 2021-05-24 PROCEDURE — 85610 PROTHROMBIN TIME: CPT | Performed by: EMERGENCY MEDICINE

## 2021-05-24 PROCEDURE — 99283 EMERGENCY DEPT VISIT LOW MDM: CPT

## 2021-05-24 PROCEDURE — 51702 INSERT TEMP BLADDER CATH: CPT

## 2021-05-24 PROCEDURE — 85025 COMPLETE CBC W/AUTO DIFF WBC: CPT | Performed by: EMERGENCY MEDICINE

## 2021-05-24 PROCEDURE — 85730 THROMBOPLASTIN TIME PARTIAL: CPT | Performed by: EMERGENCY MEDICINE

## 2021-05-24 PROCEDURE — 80048 BASIC METABOLIC PNL TOTAL CA: CPT | Performed by: EMERGENCY MEDICINE

## 2021-05-24 RX ADMIN — IOPAMIDOL 100 ML: 612 INJECTION, SOLUTION INTRAVENOUS at 22:43

## 2021-05-25 VITALS
DIASTOLIC BLOOD PRESSURE: 72 MMHG | TEMPERATURE: 98 F | WEIGHT: 160 LBS | HEART RATE: 63 BPM | RESPIRATION RATE: 18 BRPM | HEIGHT: 72 IN | BODY MASS INDEX: 21.67 KG/M2 | SYSTOLIC BLOOD PRESSURE: 121 MMHG | OXYGEN SATURATION: 96 %

## 2021-05-25 LAB — GLUCOSE BLDC GLUCOMTR-MCNC: 114 MG/DL (ref 70–130)

## 2021-05-25 PROCEDURE — 82962 GLUCOSE BLOOD TEST: CPT

## 2021-05-25 RX ORDER — DOCUSATE SODIUM 100 MG/1
100 CAPSULE, LIQUID FILLED ORAL 2 TIMES DAILY
Qty: 15 CAPSULE | Refills: 0 | Status: SHIPPED | OUTPATIENT
Start: 2021-05-25 | End: 2021-07-23 | Stop reason: SDUPTHER

## 2021-05-25 RX ORDER — TAMSULOSIN HYDROCHLORIDE 0.4 MG/1
1 CAPSULE ORAL NIGHTLY
Qty: 30 CAPSULE | Refills: 0 | Status: SHIPPED | OUTPATIENT
Start: 2021-05-25 | End: 2021-07-22

## 2021-05-25 NOTE — ED NOTES
Attempted to call [facility] for transportation back home but services do not start until 0800.      Mela Leonardo, RN  05/25/21 [id number]

## 2021-05-25 NOTE — DISCHARGE INSTRUCTIONS
Hi,    You do have a small kidney stone and enlarged prostate either of which maybe leading to the blood in your urine. Make sure you see the urologist to have the gustafson removed and return here for worsening symptoms.

## 2021-05-25 NOTE — ED PROVIDER NOTES
Subjective   66 y/o male with history of stroke arrives for evaluation of painless hematuria. He states he micturated and then noted some drops of blood on the toilet afterwards. He was unsure if this was from his penis or rectum thus had a BM which he states was normal. He denies any previous history of similar or any anticoagulation usage, falls, trauma, dysuria or urinary frequency/urgency. He denies any history of prostate issues or cancer. He arrives in NAD.           Review of Systems   All other systems reviewed and are negative.      Past Medical History:   Diagnosis Date   • Cataract    • Diabetes mellitus (CMS/HCC)    • GERD (gastroesophageal reflux disease)    • Hypothyroidism        No Known Allergies    Past Surgical History:   Procedure Laterality Date   • OTHER SURGICAL HISTORY      cyst on tailbone, left big toe       No family history on file.    Social History     Socioeconomic History   • Marital status: Single     Spouse name: Not on file   • Number of children: Not on file   • Years of education: Not on file   • Highest education level: Not on file   Tobacco Use   • Smoking status: Current Every Day Smoker     Types: Cigarettes   • Smokeless tobacco: Never Used   • Tobacco comment: 16-17 cigs a day   Substance and Sexual Activity   • Alcohol use: Never           Objective   Physical Exam  Vitals and nursing note reviewed.   Constitutional:       Appearance: Normal appearance. He is normal weight.   HENT:      Head: Normocephalic and atraumatic.      Right Ear: External ear normal.      Left Ear: External ear normal.      Nose: Nose normal.      Mouth/Throat:      Mouth: Mucous membranes are moist.      Pharynx: Oropharynx is clear.   Eyes:      Conjunctiva/sclera: Conjunctivae normal.      Pupils: Pupils are equal, round, and reactive to light.   Cardiovascular:      Rate and Rhythm: Normal rate and regular rhythm.      Pulses: Normal pulses.      Heart sounds: Normal heart sounds.   Pulmonary:       Effort: Pulmonary effort is normal.      Breath sounds: Normal breath sounds.   Abdominal:      General: Abdomen is flat. Bowel sounds are normal. There is no distension.      Palpations: There is no mass.      Tenderness: There is no abdominal tenderness. There is no guarding or rebound.      Hernia: No hernia is present.   Genitourinary:     Penis: Normal.    Musculoskeletal:         General: Normal range of motion.      Cervical back: Normal range of motion and neck supple.   Skin:     General: Skin is warm and dry.      Capillary Refill: Capillary refill takes less than 2 seconds.   Neurological:      General: No focal deficit present.      Mental Status: He is alert and oriented to person, place, and time. Mental status is at baseline.   Psychiatric:         Mood and Affect: Mood normal.         Behavior: Behavior normal.         Procedures           ED Course  ED Course as of May 25 0127   Tue May 25, 2021   0049 CT shows 3 mm stone near the uvj, constipation, enlarged prostate, no acute findings.     []   0124 Patient had >500 cc in bladder and could not mictruate thus we did place gustafson    At this time we will dc to home. His hematuria is likely either from his stone or prostate. We will do flomax and medications for his constiption and provide urology followup. All qustions answered.     [JH]      ED Course User Index  [] Baldemar Austin MD            CT Abdomen Pelvis With Contrast    (Results Pending)     Labs Reviewed   BASIC METABOLIC PANEL - Abnormal; Notable for the following components:       Result Value    Glucose 164 (*)     BUN 25 (*)     All other components within normal limits    Narrative:     GFR Normal >60  Chronic Kidney Disease <60  Kidney Failure <15     URINALYSIS W/ CULTURE IF INDICATED - Abnormal; Notable for the following components:    Glucose,  mg/dL (1+) (*)     All other components within normal limits    Narrative:     Urine microscopic not indicated.   CBC  WITH AUTO DIFFERENTIAL - Abnormal; Notable for the following components:    RBC 4.10 (*)     Hemoglobin 12.7 (*)     RDW 12.1 (*)     All other components within normal limits   PROTIME-INR - Normal   APTT - Normal   POCT GLUCOSE FINGERSTICK - Normal   RAINBOW DRAW    Narrative:     The following orders were created for panel order [place] Draw.  Procedure                               Abnormality         Status                     ---------                               -----------         ------                     Light Blue Top[[id number]]                                   Final result               Green Top (Gel)[[id number]]                                  Final result               Lavender Top[[id number]]                                     Final result               Red Top[[id number]]                                          Final result                 Please view results for these tests on the individual orders.   POCT GLUCOSE FINGERSTICK   LIGHT BLUE TOP   GREEN TOP   LAVENDER TOP   RED TOP   CBC AND DIFFERENTIAL    Narrative:     The following orders were created for panel order CBC & Differential.  Procedure                               Abnormality         Status                     ---------                               -----------         ------                     CBC Auto Differential[[id number]]        Abnormal            Final result                 Please view results for these tests on the individual orders.                                       MDM    Final diagnoses:   Kidney stone   Enlarged prostate   Constipation, unspecified constipation type   Urinary retention       ED Disposition  ED Disposition     ED Disposition Condition Comment    Discharge Stable           Kesha Del Cid, APRN  [address] [place] [id number] [phone]          Piyush Shukla MD  [address]  [place] [address] [phone]    Call            Medication List      New  Prescriptions    docusate sodium 100 MG capsule  Commonly known as: COLACE  Take 1 capsule by mouth 2 (Two) Times a Day.     tamsulosin 0.4 MG capsule 24 hr capsule  Commonly known as: FLOMAX  Take 1 capsule by mouth Every Night.           Where to Get Your Medications      These medications were sent to [facility] - [place], [place] - [address] - [phone]  - [phone]   [address], [facility] [address]    Phone: [phone]   · docusate sodium 100 MG capsule  · tamsulosin 0.4 MG capsule 24 hr capsule          Baldemar Austin MD  05/25/21 5088

## 2021-05-25 NOTE — ED NOTES
Attempted to call brother for ride home but unable to reach him at this time.     Mela Leonardo, RN  05/25/21 0153

## 2021-05-27 ENCOUNTER — TELEPHONE (OUTPATIENT)
Dept: UROLOGY | Facility: CLINIC | Age: 66
End: 2021-05-27

## 2021-05-27 NOTE — TELEPHONE ENCOUNTER
SEEN IN ER 05/26/2021, CATH PLACED, STONE IN UVJ. WHEN SHOULD PT BE SCHEDULED AND WITH WHOME? PLEASE ADVISE

## 2021-05-29 ENCOUNTER — HOSPITAL ENCOUNTER (INPATIENT)
Facility: HOSPITAL | Age: 66
LOS: 4 days | Discharge: HOME HEALTH | End: 2021-06-02
Attending: FAMILY MEDICINE | Admitting: FAMILY MEDICINE

## 2021-05-29 ENCOUNTER — APPOINTMENT (OUTPATIENT)
Dept: CT IMAGING | Facility: HOSPITAL | Age: 66
End: 2021-05-29

## 2021-05-29 ENCOUNTER — APPOINTMENT (OUTPATIENT)
Dept: GENERAL RADIOLOGY | Facility: HOSPITAL | Age: 66
End: 2021-05-29

## 2021-05-29 DIAGNOSIS — R53.1 GENERALIZED WEAKNESS: ICD-10-CM

## 2021-05-29 DIAGNOSIS — R26.9 GAIT ABNORMALITY: ICD-10-CM

## 2021-05-29 DIAGNOSIS — N39.0 ACUTE UTI: ICD-10-CM

## 2021-05-29 DIAGNOSIS — E10.10 DIABETIC KETOACIDOSIS WITHOUT COMA ASSOCIATED WITH TYPE 1 DIABETES MELLITUS (HCC): Primary | ICD-10-CM

## 2021-05-29 LAB
ACETONE BLD QL: ABNORMAL
ALBUMIN SERPL-MCNC: 2.4 G/DL (ref 3.5–5.2)
ALBUMIN/GLOB SERPL: 1.3 G/DL
ALP SERPL-CCNC: 48 U/L (ref 39–117)
ALT SERPL W P-5'-P-CCNC: 8 U/L (ref 1–41)
ANION GAP SERPL CALCULATED.3IONS-SCNC: 12 MMOL/L (ref 5–15)
ANION GAP SERPL CALCULATED.3IONS-SCNC: 12 MMOL/L (ref 5–15)
ANION GAP SERPL CALCULATED.3IONS-SCNC: 14 MMOL/L (ref 5–15)
ANION GAP SERPL CALCULATED.3IONS-SCNC: 9 MMOL/L (ref 5–15)
ARTERIAL PATENCY WRIST A: POSITIVE
AST SERPL-CCNC: 15 U/L (ref 1–40)
ATMOSPHERIC PRESS: 752 MMHG
BACTERIA UR QL AUTO: ABNORMAL /HPF
BASE EXCESS BLDA CALC-SCNC: -6.9 MMOL/L (ref 0–2)
BDY SITE: ABNORMAL
BILIRUB SERPL-MCNC: 0.5 MG/DL (ref 0–1.2)
BILIRUB UR QL STRIP: NEGATIVE
BODY TEMPERATURE: 37 C
BUN SERPL-MCNC: 20 MG/DL (ref 8–23)
BUN SERPL-MCNC: 23 MG/DL (ref 8–23)
BUN SERPL-MCNC: 24 MG/DL (ref 8–23)
BUN SERPL-MCNC: 26 MG/DL (ref 8–23)
BUN/CREAT SERPL: 22.8 (ref 7–25)
BUN/CREAT SERPL: 24 (ref 7–25)
BUN/CREAT SERPL: 24.5 (ref 7–25)
BUN/CREAT SERPL: 25 (ref 7–25)
CALCIUM SPEC-SCNC: 6 MG/DL (ref 8.6–10.5)
CALCIUM SPEC-SCNC: 8.2 MG/DL (ref 8.6–10.5)
CALCIUM SPEC-SCNC: 8.7 MG/DL (ref 8.6–10.5)
CALCIUM SPEC-SCNC: 8.8 MG/DL (ref 8.6–10.5)
CHLORIDE SERPL-SCNC: 101 MMOL/L (ref 98–107)
CHLORIDE SERPL-SCNC: 105 MMOL/L (ref 98–107)
CHLORIDE SERPL-SCNC: 106 MMOL/L (ref 98–107)
CHLORIDE SERPL-SCNC: 110 MMOL/L (ref 98–107)
CLARITY UR: CLEAR
CO2 SERPL-SCNC: 14 MMOL/L (ref 22–29)
CO2 SERPL-SCNC: 18 MMOL/L (ref 22–29)
CO2 SERPL-SCNC: 18 MMOL/L (ref 22–29)
CO2 SERPL-SCNC: 20 MMOL/L (ref 22–29)
COLOR UR: YELLOW
CREAT SERPL-MCNC: 0.8 MG/DL (ref 0.76–1.27)
CREAT SERPL-MCNC: 0.94 MG/DL (ref 0.76–1.27)
CREAT SERPL-MCNC: 1 MG/DL (ref 0.76–1.27)
CREAT SERPL-MCNC: 1.14 MG/DL (ref 0.76–1.27)
D-LACTATE SERPL-SCNC: 1.4 MMOL/L (ref 0.5–2)
DEPRECATED RDW RBC AUTO: 40.7 FL (ref 37–54)
ERYTHROCYTE [DISTWIDTH] IN BLOOD BY AUTOMATED COUNT: 12 % (ref 12.3–15.4)
GFR SERPL CREATININE-BSD FRML MDRD: 64 ML/MIN/1.73
GFR SERPL CREATININE-BSD FRML MDRD: 75 ML/MIN/1.73
GFR SERPL CREATININE-BSD FRML MDRD: 80 ML/MIN/1.73
GFR SERPL CREATININE-BSD FRML MDRD: 97 ML/MIN/1.73
GLOBULIN UR ELPH-MCNC: 1.9 GM/DL
GLUCOSE BLDC GLUCOMTR-MCNC: 115 MG/DL (ref 70–130)
GLUCOSE BLDC GLUCOMTR-MCNC: 115 MG/DL (ref 70–130)
GLUCOSE BLDC GLUCOMTR-MCNC: 129 MG/DL (ref 70–130)
GLUCOSE BLDC GLUCOMTR-MCNC: 137 MG/DL (ref 70–130)
GLUCOSE BLDC GLUCOMTR-MCNC: 161 MG/DL (ref 70–130)
GLUCOSE BLDC GLUCOMTR-MCNC: 219 MG/DL (ref 70–130)
GLUCOSE BLDC GLUCOMTR-MCNC: 228 MG/DL (ref 70–130)
GLUCOSE BLDC GLUCOMTR-MCNC: 293 MG/DL (ref 70–130)
GLUCOSE BLDC GLUCOMTR-MCNC: 405 MG/DL (ref 70–130)
GLUCOSE BLDC GLUCOMTR-MCNC: 412 MG/DL (ref 70–130)
GLUCOSE BLDC GLUCOMTR-MCNC: 416 MG/DL (ref 70–130)
GLUCOSE BLDC GLUCOMTR-MCNC: 416 MG/DL (ref 70–130)
GLUCOSE SERPL-MCNC: 122 MG/DL (ref 65–99)
GLUCOSE SERPL-MCNC: 200 MG/DL (ref 65–99)
GLUCOSE SERPL-MCNC: 328 MG/DL (ref 65–99)
GLUCOSE SERPL-MCNC: 341 MG/DL (ref 65–99)
GLUCOSE UR STRIP-MCNC: ABNORMAL MG/DL
HBA1C MFR BLD: 7.9 % (ref 4.8–5.6)
HCO3 BLDA-SCNC: 17.5 MMOL/L (ref 20–26)
HCT VFR BLD AUTO: 29 % (ref 37.5–51)
HGB BLD-MCNC: 9.7 G/DL (ref 13–17.7)
HGB UR QL STRIP.AUTO: ABNORMAL
HYALINE CASTS UR QL AUTO: ABNORMAL /LPF
KETONES UR QL STRIP: ABNORMAL
LEUKOCYTE ESTERASE UR QL STRIP.AUTO: ABNORMAL
LYMPHOCYTES # BLD MANUAL: 1.43 10*3/MM3 (ref 0.7–3.1)
LYMPHOCYTES NFR BLD MANUAL: 5 % (ref 5–12)
LYMPHOCYTES NFR BLD MANUAL: 7 % (ref 19.6–45.3)
Lab: ABNORMAL
MAGNESIUM SERPL-MCNC: 1.1 MG/DL (ref 1.6–2.4)
MAGNESIUM SERPL-MCNC: 1.6 MG/DL (ref 1.6–2.4)
MAGNESIUM SERPL-MCNC: 1.6 MG/DL (ref 1.6–2.4)
MAGNESIUM SERPL-MCNC: 1.7 MG/DL (ref 1.6–2.4)
MCH RBC QN AUTO: 30.8 PG (ref 26.6–33)
MCHC RBC AUTO-ENTMCNC: 33.4 G/DL (ref 31.5–35.7)
MCV RBC AUTO: 92.1 FL (ref 79–97)
MODALITY: ABNORMAL
MONOCYTES # BLD AUTO: 1.02 10*3/MM3 (ref 0.1–0.9)
NEUTROPHILS # BLD AUTO: 17.97 10*3/MM3 (ref 1.7–7)
NEUTROPHILS NFR BLD MANUAL: 80 % (ref 42.7–76)
NEUTS BAND NFR BLD MANUAL: 8 % (ref 0–5)
NITRITE UR QL STRIP: POSITIVE
OSMOLALITY SERPL: 296 MOSM/KG (ref 280–301)
PCO2 BLDA: 31.2 MM HG (ref 35–45)
PCO2 TEMP ADJ BLD: 31.2 MM HG (ref 35–45)
PH BLDA: 7.36 PH UNITS (ref 7.35–7.45)
PH UR STRIP.AUTO: 5.5 [PH] (ref 5–8)
PH, TEMP CORRECTED: 7.36 PH UNITS (ref 7.35–7.45)
PHOSPHATE SERPL-MCNC: 1.5 MG/DL (ref 2.5–4.5)
PHOSPHATE SERPL-MCNC: 2.6 MG/DL (ref 2.5–4.5)
PHOSPHATE SERPL-MCNC: 2.9 MG/DL (ref 2.5–4.5)
PHOSPHATE SERPL-MCNC: 4.2 MG/DL (ref 2.5–4.5)
PLATELET # BLD AUTO: 116 10*3/MM3 (ref 140–450)
PMV BLD AUTO: 10.2 FL (ref 6–12)
PO2 BLDA: 75.9 MM HG (ref 83–108)
PO2 TEMP ADJ BLD: 75.9 MM HG (ref 83–108)
POIKILOCYTOSIS BLD QL SMEAR: ABNORMAL
POTASSIUM SERPL-SCNC: 3.6 MMOL/L (ref 3.5–5.2)
POTASSIUM SERPL-SCNC: 4 MMOL/L (ref 3.5–5.2)
POTASSIUM SERPL-SCNC: 4 MMOL/L (ref 3.5–5.2)
POTASSIUM SERPL-SCNC: 4.1 MMOL/L (ref 3.5–5.2)
PROT SERPL-MCNC: 4.3 G/DL (ref 6–8.5)
PROT UR QL STRIP: ABNORMAL
RBC # BLD AUTO: 3.15 10*6/MM3 (ref 4.14–5.8)
RBC # UR: ABNORMAL /HPF
REF LAB TEST METHOD: ABNORMAL
SAO2 % BLDCOA: 95 % (ref 94–99)
SARS-COV-2 RNA PNL SPEC NAA+PROBE: NOT DETECTED
SMALL PLATELETS BLD QL SMEAR: ABNORMAL
SODIUM SERPL-SCNC: 133 MMOL/L (ref 136–145)
SODIUM SERPL-SCNC: 135 MMOL/L (ref 136–145)
SODIUM SERPL-SCNC: 135 MMOL/L (ref 136–145)
SODIUM SERPL-SCNC: 136 MMOL/L (ref 136–145)
SP GR UR STRIP: 1.03 (ref 1–1.03)
SQUAMOUS #/AREA URNS HPF: ABNORMAL /HPF
UROBILINOGEN UR QL STRIP: ABNORMAL
VENTILATOR MODE: ABNORMAL
WBC # BLD AUTO: 20.42 10*3/MM3 (ref 3.4–10.8)
WBC MORPH BLD: NORMAL
WBC UR QL AUTO: ABNORMAL /HPF

## 2021-05-29 PROCEDURE — 82009 KETONE BODYS QUAL: CPT | Performed by: NURSE PRACTITIONER

## 2021-05-29 PROCEDURE — 87086 URINE CULTURE/COLONY COUNT: CPT | Performed by: NURSE PRACTITIONER

## 2021-05-29 PROCEDURE — 87186 SC STD MICRODIL/AGAR DIL: CPT | Performed by: NURSE PRACTITIONER

## 2021-05-29 PROCEDURE — 83036 HEMOGLOBIN GLYCOSYLATED A1C: CPT | Performed by: FAMILY MEDICINE

## 2021-05-29 PROCEDURE — 71045 X-RAY EXAM CHEST 1 VIEW: CPT

## 2021-05-29 PROCEDURE — 36600 WITHDRAWAL OF ARTERIAL BLOOD: CPT

## 2021-05-29 PROCEDURE — 82962 GLUCOSE BLOOD TEST: CPT

## 2021-05-29 PROCEDURE — 93010 ELECTROCARDIOGRAM REPORT: CPT | Performed by: INTERNAL MEDICINE

## 2021-05-29 PROCEDURE — 63710000001 INSULIN REGULAR HUMAN PER 5 UNITS: Performed by: NURSE PRACTITIONER

## 2021-05-29 PROCEDURE — 25010000002 CEFTRIAXONE PER 250 MG: Performed by: NURSE PRACTITIONER

## 2021-05-29 PROCEDURE — 83735 ASSAY OF MAGNESIUM: CPT | Performed by: FAMILY MEDICINE

## 2021-05-29 PROCEDURE — 84100 ASSAY OF PHOSPHORUS: CPT | Performed by: FAMILY MEDICINE

## 2021-05-29 PROCEDURE — 87635 SARS-COV-2 COVID-19 AMP PRB: CPT | Performed by: NURSE PRACTITIONER

## 2021-05-29 PROCEDURE — 25010000003 INSULIN REGULAR HUMAN PER 5 UNITS: Performed by: FAMILY MEDICINE

## 2021-05-29 PROCEDURE — 83605 ASSAY OF LACTIC ACID: CPT | Performed by: NURSE PRACTITIONER

## 2021-05-29 PROCEDURE — 93005 ELECTROCARDIOGRAM TRACING: CPT | Performed by: FAMILY MEDICINE

## 2021-05-29 PROCEDURE — 70450 CT HEAD/BRAIN W/O DYE: CPT

## 2021-05-29 PROCEDURE — 99284 EMERGENCY DEPT VISIT MOD MDM: CPT

## 2021-05-29 PROCEDURE — 85007 BL SMEAR W/DIFF WBC COUNT: CPT | Performed by: NURSE PRACTITIONER

## 2021-05-29 PROCEDURE — 85025 COMPLETE CBC W/AUTO DIFF WBC: CPT | Performed by: NURSE PRACTITIONER

## 2021-05-29 PROCEDURE — 83930 ASSAY OF BLOOD OSMOLALITY: CPT | Performed by: FAMILY MEDICINE

## 2021-05-29 PROCEDURE — 82803 BLOOD GASES ANY COMBINATION: CPT

## 2021-05-29 PROCEDURE — 63710000001 INSULIN DETEMIR PER 5 UNITS: Performed by: FAMILY MEDICINE

## 2021-05-29 PROCEDURE — 80053 COMPREHEN METABOLIC PANEL: CPT | Performed by: NURSE PRACTITIONER

## 2021-05-29 PROCEDURE — 81001 URINALYSIS AUTO W/SCOPE: CPT | Performed by: NURSE PRACTITIONER

## 2021-05-29 RX ORDER — SODIUM CHLORIDE AND POTASSIUM CHLORIDE 300; 900 MG/100ML; MG/100ML
250 INJECTION, SOLUTION INTRAVENOUS CONTINUOUS PRN
Status: DISCONTINUED | OUTPATIENT
Start: 2021-05-29 | End: 2021-05-29

## 2021-05-29 RX ORDER — ERGOCALCIFEROL 1.25 MG/1
50000 CAPSULE ORAL
COMMUNITY
End: 2021-07-23 | Stop reason: SDUPTHER

## 2021-05-29 RX ORDER — NICOTINE POLACRILEX 4 MG
15 LOZENGE BUCCAL
Status: DISCONTINUED | OUTPATIENT
Start: 2021-05-29 | End: 2021-06-02 | Stop reason: HOSPADM

## 2021-05-29 RX ORDER — SODIUM CHLORIDE 9 MG/ML
250 INJECTION, SOLUTION INTRAVENOUS CONTINUOUS PRN
Status: DISCONTINUED | OUTPATIENT
Start: 2021-05-29 | End: 2021-05-29

## 2021-05-29 RX ORDER — SODIUM CHLORIDE 450 MG/100ML
250 INJECTION, SOLUTION INTRAVENOUS CONTINUOUS PRN
Status: DISCONTINUED | OUTPATIENT
Start: 2021-05-29 | End: 2021-05-29

## 2021-05-29 RX ORDER — SODIUM CHLORIDE 9 MG/ML
100 INJECTION, SOLUTION INTRAVENOUS CONTINUOUS
Status: DISCONTINUED | OUTPATIENT
Start: 2021-05-29 | End: 2021-05-31

## 2021-05-29 RX ORDER — POTASSIUM CHLORIDE, DEXTROSE MONOHYDRATE AND SODIUM CHLORIDE 300; 5; 900 MG/100ML; G/100ML; MG/100ML
150 INJECTION, SOLUTION INTRAVENOUS CONTINUOUS PRN
Status: DISCONTINUED | OUTPATIENT
Start: 2021-05-29 | End: 2021-05-29

## 2021-05-29 RX ORDER — DEXTROSE MONOHYDRATE 25 G/50ML
12.5 INJECTION, SOLUTION INTRAVENOUS AS NEEDED
Status: DISCONTINUED | OUTPATIENT
Start: 2021-05-29 | End: 2021-05-29

## 2021-05-29 RX ORDER — SODIUM CHLORIDE 0.9 % (FLUSH) 0.9 %
3 SYRINGE (ML) INJECTION EVERY 12 HOURS SCHEDULED
Status: DISCONTINUED | OUTPATIENT
Start: 2021-05-29 | End: 2021-06-02 | Stop reason: HOSPADM

## 2021-05-29 RX ORDER — SODIUM CHLORIDE 0.9 % (FLUSH) 0.9 %
10 SYRINGE (ML) INJECTION AS NEEDED
Status: DISCONTINUED | OUTPATIENT
Start: 2021-05-29 | End: 2021-06-02 | Stop reason: HOSPADM

## 2021-05-29 RX ORDER — SODIUM CHLORIDE AND POTASSIUM CHLORIDE 150; 450 MG/100ML; MG/100ML
250 INJECTION, SOLUTION INTRAVENOUS CONTINUOUS PRN
Status: DISCONTINUED | OUTPATIENT
Start: 2021-05-29 | End: 2021-05-29

## 2021-05-29 RX ORDER — SODIUM CHLORIDE 0.9 % (FLUSH) 0.9 %
10 SYRINGE (ML) INJECTION ONCE AS NEEDED
Status: DISCONTINUED | OUTPATIENT
Start: 2021-05-29 | End: 2021-06-02 | Stop reason: HOSPADM

## 2021-05-29 RX ORDER — DEXTROSE AND SODIUM CHLORIDE 5; .9 G/100ML; G/100ML
150 INJECTION, SOLUTION INTRAVENOUS CONTINUOUS PRN
Status: DISCONTINUED | OUTPATIENT
Start: 2021-05-29 | End: 2021-05-29

## 2021-05-29 RX ORDER — ONDANSETRON 2 MG/ML
4 INJECTION INTRAMUSCULAR; INTRAVENOUS EVERY 6 HOURS PRN
Status: DISCONTINUED | OUTPATIENT
Start: 2021-05-29 | End: 2021-06-02 | Stop reason: HOSPADM

## 2021-05-29 RX ORDER — DEXTROSE MONOHYDRATE 25 G/50ML
25 INJECTION, SOLUTION INTRAVENOUS
Status: DISCONTINUED | OUTPATIENT
Start: 2021-05-29 | End: 2021-06-02 | Stop reason: HOSPADM

## 2021-05-29 RX ORDER — FAMOTIDINE 10 MG/ML
20 INJECTION, SOLUTION INTRAVENOUS 2 TIMES DAILY
Status: DISCONTINUED | OUTPATIENT
Start: 2021-05-29 | End: 2021-06-01

## 2021-05-29 RX ORDER — DEXTROSE AND SODIUM CHLORIDE 5; .45 G/100ML; G/100ML
150 INJECTION, SOLUTION INTRAVENOUS CONTINUOUS PRN
Status: DISCONTINUED | OUTPATIENT
Start: 2021-05-29 | End: 2021-05-29

## 2021-05-29 RX ORDER — SODIUM CHLORIDE AND POTASSIUM CHLORIDE 150; 900 MG/100ML; MG/100ML
250 INJECTION, SOLUTION INTRAVENOUS CONTINUOUS PRN
Status: DISCONTINUED | OUTPATIENT
Start: 2021-05-29 | End: 2021-05-29

## 2021-05-29 RX ORDER — DEXTROSE, SODIUM CHLORIDE, AND POTASSIUM CHLORIDE 5; .45; .3 G/100ML; G/100ML; G/100ML
150 INJECTION INTRAVENOUS CONTINUOUS PRN
Status: DISCONTINUED | OUTPATIENT
Start: 2021-05-29 | End: 2021-05-29

## 2021-05-29 RX ORDER — DEXTROSE, SODIUM CHLORIDE, AND POTASSIUM CHLORIDE 5; .9; .15 G/100ML; G/100ML; G/100ML
150 INJECTION INTRAVENOUS CONTINUOUS PRN
Status: DISCONTINUED | OUTPATIENT
Start: 2021-05-29 | End: 2021-05-29

## 2021-05-29 RX ORDER — DEXTROSE, SODIUM CHLORIDE, AND POTASSIUM CHLORIDE 5; .45; .15 G/100ML; G/100ML; G/100ML
150 INJECTION INTRAVENOUS CONTINUOUS PRN
Status: DISCONTINUED | OUTPATIENT
Start: 2021-05-29 | End: 2021-05-29

## 2021-05-29 RX ORDER — SODIUM CHLORIDE 9 MG/ML
10 INJECTION, SOLUTION INTRAVENOUS CONTINUOUS PRN
Status: DISCONTINUED | OUTPATIENT
Start: 2021-05-29 | End: 2021-06-02 | Stop reason: HOSPADM

## 2021-05-29 RX ADMIN — FAMOTIDINE 20 MG: 10 INJECTION INTRAVENOUS at 12:15

## 2021-05-29 RX ADMIN — SODIUM CHLORIDE 1 G: 900 INJECTION INTRAVENOUS at 10:00

## 2021-05-29 RX ADMIN — SODIUM CHLORIDE, PRESERVATIVE FREE 3 ML: 5 INJECTION INTRAVENOUS at 12:16

## 2021-05-29 RX ADMIN — FAMOTIDINE 20 MG: 10 INJECTION INTRAVENOUS at 20:03

## 2021-05-29 RX ADMIN — SODIUM CHLORIDE 2000 ML: 9 INJECTION, SOLUTION INTRAVENOUS at 12:15

## 2021-05-29 RX ADMIN — DEXTROSE AND SODIUM CHLORIDE 150 ML/HR: 5; 450 INJECTION, SOLUTION INTRAVENOUS at 17:05

## 2021-05-29 RX ADMIN — SODIUM CHLORIDE, PRESERVATIVE FREE 3 ML: 5 INJECTION INTRAVENOUS at 20:03

## 2021-05-29 RX ADMIN — INSULIN DETEMIR 5 UNITS: 100 INJECTION, SOLUTION SUBCUTANEOUS at 21:09

## 2021-05-29 RX ADMIN — SODIUM PHOSPHATE, MONOBASIC, MONOHYDRATE 30 MMOL: 276; 142 INJECTION, SOLUTION INTRAVENOUS at 18:45

## 2021-05-29 RX ADMIN — SODIUM CHLORIDE 100 ML/HR: 9 INJECTION, SOLUTION INTRAVENOUS at 18:45

## 2021-05-29 RX ADMIN — DEXTROSE AND SODIUM CHLORIDE 150 ML/HR: 5; 900 INJECTION, SOLUTION INTRAVENOUS at 15:05

## 2021-05-29 RX ADMIN — INSULIN HUMAN 15 UNITS: 100 INJECTION, SOLUTION PARENTERAL at 10:25

## 2021-05-29 RX ADMIN — SODIUM CHLORIDE 2000 ML: 9 INJECTION, SOLUTION INTRAVENOUS at 08:08

## 2021-05-29 RX ADMIN — SODIUM CHLORIDE 8 UNITS/HR: 9 INJECTION, SOLUTION INTRAVENOUS at 12:20

## 2021-05-30 PROBLEM — T83.511A UTI (URINARY TRACT INFECTION) DUE TO URINARY INDWELLING CATHETER (HCC): Status: ACTIVE | Noted: 2021-05-30

## 2021-05-30 PROBLEM — N39.0 UTI (URINARY TRACT INFECTION) DUE TO URINARY INDWELLING CATHETER: Status: ACTIVE | Noted: 2021-05-30

## 2021-05-30 LAB
ANION GAP SERPL CALCULATED.3IONS-SCNC: 11 MMOL/L (ref 5–15)
ANION GAP SERPL CALCULATED.3IONS-SCNC: 12 MMOL/L (ref 5–15)
ANION GAP SERPL CALCULATED.3IONS-SCNC: 9 MMOL/L (ref 5–15)
BUN SERPL-MCNC: 23 MG/DL (ref 8–23)
BUN SERPL-MCNC: 23 MG/DL (ref 8–23)
BUN SERPL-MCNC: 25 MG/DL (ref 8–23)
BUN/CREAT SERPL: 24 (ref 7–25)
BUN/CREAT SERPL: 24.7 (ref 7–25)
BUN/CREAT SERPL: 25.8 (ref 7–25)
CALCIUM SPEC-SCNC: 8.2 MG/DL (ref 8.6–10.5)
CALCIUM SPEC-SCNC: 8.3 MG/DL (ref 8.6–10.5)
CALCIUM SPEC-SCNC: 8.4 MG/DL (ref 8.6–10.5)
CHLORIDE SERPL-SCNC: 104 MMOL/L (ref 98–107)
CHOLEST SERPL-MCNC: 92 MG/DL (ref 0–200)
CO2 SERPL-SCNC: 18 MMOL/L (ref 22–29)
CO2 SERPL-SCNC: 19 MMOL/L (ref 22–29)
CO2 SERPL-SCNC: 20 MMOL/L (ref 22–29)
CREAT SERPL-MCNC: 0.93 MG/DL (ref 0.76–1.27)
CREAT SERPL-MCNC: 0.96 MG/DL (ref 0.76–1.27)
CREAT SERPL-MCNC: 0.97 MG/DL (ref 0.76–1.27)
DEPRECATED RDW RBC AUTO: 39.7 FL (ref 37–54)
ERYTHROCYTE [DISTWIDTH] IN BLOOD BY AUTOMATED COUNT: 12 % (ref 12.3–15.4)
GFR SERPL CREATININE-BSD FRML MDRD: 77 ML/MIN/1.73
GFR SERPL CREATININE-BSD FRML MDRD: 78 ML/MIN/1.73
GFR SERPL CREATININE-BSD FRML MDRD: 81 ML/MIN/1.73
GLUCOSE BLDC GLUCOMTR-MCNC: 233 MG/DL (ref 70–130)
GLUCOSE BLDC GLUCOMTR-MCNC: 262 MG/DL (ref 70–130)
GLUCOSE BLDC GLUCOMTR-MCNC: 340 MG/DL (ref 70–130)
GLUCOSE SERPL-MCNC: 242 MG/DL (ref 65–99)
GLUCOSE SERPL-MCNC: 280 MG/DL (ref 65–99)
GLUCOSE SERPL-MCNC: 304 MG/DL (ref 65–99)
HCT VFR BLD AUTO: 33 % (ref 37.5–51)
HDLC SERPL-MCNC: 48 MG/DL (ref 40–60)
HGB BLD-MCNC: 11.3 G/DL (ref 13–17.7)
LDLC SERPL CALC-MCNC: 27 MG/DL (ref 0–100)
LDLC/HDLC SERPL: 0.55 {RATIO}
LYMPHOCYTES # BLD MANUAL: 1.91 10*3/MM3 (ref 0.7–3.1)
LYMPHOCYTES NFR BLD MANUAL: 5 % (ref 5–12)
LYMPHOCYTES NFR BLD MANUAL: 8.9 % (ref 19.6–45.3)
MAGNESIUM SERPL-MCNC: 1.7 MG/DL (ref 1.6–2.4)
MAGNESIUM SERPL-MCNC: 1.7 MG/DL (ref 1.6–2.4)
MAGNESIUM SERPL-MCNC: 1.8 MG/DL (ref 1.6–2.4)
MCH RBC QN AUTO: 31 PG (ref 26.6–33)
MCHC RBC AUTO-ENTMCNC: 34.2 G/DL (ref 31.5–35.7)
MCV RBC AUTO: 90.4 FL (ref 79–97)
MONOCYTES # BLD AUTO: 1.07 10*3/MM3 (ref 0.1–0.9)
NEUTROPHILS # BLD AUTO: 18.45 10*3/MM3 (ref 1.7–7)
NEUTROPHILS NFR BLD MANUAL: 82.2 % (ref 42.7–76)
NEUTS BAND NFR BLD MANUAL: 4 % (ref 0–5)
PHOSPHATE SERPL-MCNC: 3.1 MG/DL (ref 2.5–4.5)
PHOSPHATE SERPL-MCNC: 3.3 MG/DL (ref 2.5–4.5)
PHOSPHATE SERPL-MCNC: 3.3 MG/DL (ref 2.5–4.5)
PLAT MORPH BLD: NORMAL
PLATELET # BLD AUTO: 138 10*3/MM3 (ref 140–450)
PMV BLD AUTO: 10.2 FL (ref 6–12)
POIKILOCYTOSIS BLD QL SMEAR: ABNORMAL
POLYCHROMASIA BLD QL SMEAR: ABNORMAL
POTASSIUM SERPL-SCNC: 4.2 MMOL/L (ref 3.5–5.2)
POTASSIUM SERPL-SCNC: 4.3 MMOL/L (ref 3.5–5.2)
POTASSIUM SERPL-SCNC: 4.5 MMOL/L (ref 3.5–5.2)
PTH-INTACT SERPL-MCNC: 35.4 PG/ML (ref 15–65)
RBC # BLD AUTO: 3.65 10*6/MM3 (ref 4.14–5.8)
SODIUM SERPL-SCNC: 133 MMOL/L (ref 136–145)
SODIUM SERPL-SCNC: 134 MMOL/L (ref 136–145)
SODIUM SERPL-SCNC: 134 MMOL/L (ref 136–145)
T3FREE SERPL-MCNC: 0.58 PG/ML (ref 2–4.4)
TRIGL SERPL-MCNC: 87 MG/DL (ref 0–150)
TSH SERPL DL<=0.05 MIU/L-ACNC: 5.96 UIU/ML (ref 0.27–4.2)
VLDLC SERPL-MCNC: 17 MG/DL (ref 5–40)
WBC # BLD AUTO: 21.42 10*3/MM3 (ref 3.4–10.8)
WBC MORPH BLD: NORMAL

## 2021-05-30 PROCEDURE — 84100 ASSAY OF PHOSPHORUS: CPT | Performed by: FAMILY MEDICINE

## 2021-05-30 PROCEDURE — 84481 FREE ASSAY (FT-3): CPT | Performed by: FAMILY MEDICINE

## 2021-05-30 PROCEDURE — 83735 ASSAY OF MAGNESIUM: CPT | Performed by: FAMILY MEDICINE

## 2021-05-30 PROCEDURE — 80061 LIPID PANEL: CPT | Performed by: FAMILY MEDICINE

## 2021-05-30 PROCEDURE — 82962 GLUCOSE BLOOD TEST: CPT

## 2021-05-30 PROCEDURE — 85025 COMPLETE CBC W/AUTO DIFF WBC: CPT | Performed by: FAMILY MEDICINE

## 2021-05-30 PROCEDURE — 87040 BLOOD CULTURE FOR BACTERIA: CPT | Performed by: FAMILY MEDICINE

## 2021-05-30 PROCEDURE — 85007 BL SMEAR W/DIFF WBC COUNT: CPT | Performed by: FAMILY MEDICINE

## 2021-05-30 PROCEDURE — 80048 BASIC METABOLIC PNL TOTAL CA: CPT | Performed by: FAMILY MEDICINE

## 2021-05-30 PROCEDURE — 63710000001 INSULIN LISPRO (HUMAN) PER 5 UNITS: Performed by: FAMILY MEDICINE

## 2021-05-30 PROCEDURE — 63710000001 INSULIN LISPRO (HUMAN) PER 5 UNITS: Performed by: INTERNAL MEDICINE

## 2021-05-30 PROCEDURE — 83970 ASSAY OF PARATHORMONE: CPT | Performed by: FAMILY MEDICINE

## 2021-05-30 PROCEDURE — 84443 ASSAY THYROID STIM HORMONE: CPT | Performed by: FAMILY MEDICINE

## 2021-05-30 PROCEDURE — 63710000001 INSULIN DETEMIR PER 5 UNITS: Performed by: FAMILY MEDICINE

## 2021-05-30 PROCEDURE — 25010000002 CEFTRIAXONE PER 250 MG: Performed by: FAMILY MEDICINE

## 2021-05-30 RX ORDER — DOCUSATE SODIUM 100 MG/1
100 CAPSULE, LIQUID FILLED ORAL 2 TIMES DAILY
Status: DISCONTINUED | OUTPATIENT
Start: 2021-05-30 | End: 2021-06-02 | Stop reason: HOSPADM

## 2021-05-30 RX ORDER — TAMSULOSIN HYDROCHLORIDE 0.4 MG/1
0.4 CAPSULE ORAL NIGHTLY
Status: DISCONTINUED | OUTPATIENT
Start: 2021-05-30 | End: 2021-06-02 | Stop reason: HOSPADM

## 2021-05-30 RX ORDER — LEVOTHYROXINE SODIUM 0.15 MG/1
150 TABLET ORAL DAILY
Status: DISCONTINUED | OUTPATIENT
Start: 2021-05-30 | End: 2021-06-02 | Stop reason: HOSPADM

## 2021-05-30 RX ADMIN — SODIUM CHLORIDE, PRESERVATIVE FREE 3 ML: 5 INJECTION INTRAVENOUS at 09:12

## 2021-05-30 RX ADMIN — INSULIN DETEMIR 7 UNITS: 100 INJECTION, SOLUTION SUBCUTANEOUS at 22:35

## 2021-05-30 RX ADMIN — FAMOTIDINE 20 MG: 10 INJECTION INTRAVENOUS at 22:34

## 2021-05-30 RX ADMIN — SODIUM CHLORIDE 1 G: 900 INJECTION INTRAVENOUS at 09:12

## 2021-05-30 RX ADMIN — INSULIN LISPRO 8 UNITS: 100 INJECTION, SOLUTION INTRAVENOUS; SUBCUTANEOUS at 11:29

## 2021-05-30 RX ADMIN — TAMSULOSIN HYDROCHLORIDE 0.4 MG: 0.4 CAPSULE ORAL at 22:34

## 2021-05-30 RX ADMIN — SODIUM CHLORIDE 100 ML/HR: 9 INJECTION, SOLUTION INTRAVENOUS at 15:30

## 2021-05-30 RX ADMIN — FAMOTIDINE 20 MG: 10 INJECTION INTRAVENOUS at 09:12

## 2021-05-30 RX ADMIN — INSULIN LISPRO 10 UNITS: 100 INJECTION, SOLUTION INTRAVENOUS; SUBCUTANEOUS at 22:34

## 2021-05-30 RX ADMIN — LEVOTHYROXINE SODIUM 150 MCG: 150 TABLET ORAL at 10:42

## 2021-05-30 RX ADMIN — INSULIN LISPRO 5 UNITS: 100 INJECTION, SOLUTION INTRAVENOUS; SUBCUTANEOUS at 06:37

## 2021-05-30 RX ADMIN — INSULIN LISPRO 5 UNITS: 100 INJECTION, SOLUTION INTRAVENOUS; SUBCUTANEOUS at 18:28

## 2021-05-30 RX ADMIN — SODIUM CHLORIDE 100 ML/HR: 9 INJECTION, SOLUTION INTRAVENOUS at 05:15

## 2021-05-31 LAB
ALBUMIN SERPL-MCNC: 2.7 G/DL (ref 3.5–5.2)
ALBUMIN/GLOB SERPL: 0.9 G/DL
ALP SERPL-CCNC: 79 U/L (ref 39–117)
ALT SERPL W P-5'-P-CCNC: 11 U/L (ref 1–41)
ANION GAP SERPL CALCULATED.3IONS-SCNC: 8 MMOL/L (ref 5–15)
AST SERPL-CCNC: 16 U/L (ref 1–40)
BACTERIA SPEC AEROBE CULT: ABNORMAL
BASOPHILS # BLD AUTO: 0.03 10*3/MM3 (ref 0–0.2)
BASOPHILS NFR BLD AUTO: 0.2 % (ref 0–1.5)
BILIRUB SERPL-MCNC: 0.3 MG/DL (ref 0–1.2)
BUN SERPL-MCNC: 17 MG/DL (ref 8–23)
BUN/CREAT SERPL: 20.2 (ref 7–25)
CALCIUM SPEC-SCNC: 8.7 MG/DL (ref 8.6–10.5)
CHLORIDE SERPL-SCNC: 102 MMOL/L (ref 98–107)
CO2 SERPL-SCNC: 23 MMOL/L (ref 22–29)
CREAT SERPL-MCNC: 0.84 MG/DL (ref 0.76–1.27)
DEPRECATED RDW RBC AUTO: 39.9 FL (ref 37–54)
EOSINOPHIL # BLD AUTO: 0.05 10*3/MM3 (ref 0–0.4)
EOSINOPHIL NFR BLD AUTO: 0.3 % (ref 0.3–6.2)
ERYTHROCYTE [DISTWIDTH] IN BLOOD BY AUTOMATED COUNT: 12 % (ref 12.3–15.4)
GFR SERPL CREATININE-BSD FRML MDRD: 91 ML/MIN/1.73
GLOBULIN UR ELPH-MCNC: 2.9 GM/DL
GLUCOSE BLDC GLUCOMTR-MCNC: 119 MG/DL (ref 70–130)
GLUCOSE BLDC GLUCOMTR-MCNC: 128 MG/DL (ref 70–130)
GLUCOSE BLDC GLUCOMTR-MCNC: 133 MG/DL (ref 70–130)
GLUCOSE BLDC GLUCOMTR-MCNC: 182 MG/DL (ref 70–130)
GLUCOSE SERPL-MCNC: 162 MG/DL (ref 65–99)
HCT VFR BLD AUTO: 31 % (ref 37.5–51)
HGB BLD-MCNC: 10.5 G/DL (ref 13–17.7)
LYMPHOCYTES # BLD AUTO: 1.39 10*3/MM3 (ref 0.7–3.1)
LYMPHOCYTES NFR BLD AUTO: 9 % (ref 19.6–45.3)
MCH RBC QN AUTO: 30.7 PG (ref 26.6–33)
MCHC RBC AUTO-ENTMCNC: 33.9 G/DL (ref 31.5–35.7)
MCV RBC AUTO: 90.6 FL (ref 79–97)
MONOCYTES # BLD AUTO: 0.92 10*3/MM3 (ref 0.1–0.9)
MONOCYTES NFR BLD AUTO: 6 % (ref 5–12)
NEUTROPHILS NFR BLD AUTO: 12.83 10*3/MM3 (ref 1.7–7)
NEUTROPHILS NFR BLD AUTO: 83.3 % (ref 42.7–76)
PLATELET # BLD AUTO: 135 10*3/MM3 (ref 140–450)
PMV BLD AUTO: 11 FL (ref 6–12)
POTASSIUM SERPL-SCNC: 3.7 MMOL/L (ref 3.5–5.2)
PROT SERPL-MCNC: 5.6 G/DL (ref 6–8.5)
QT INTERVAL: 342 MS
QTC INTERVAL: 418 MS
RBC # BLD AUTO: 3.42 10*6/MM3 (ref 4.14–5.8)
SODIUM SERPL-SCNC: 133 MMOL/L (ref 136–145)
WBC # BLD AUTO: 15.41 10*3/MM3 (ref 3.4–10.8)

## 2021-05-31 PROCEDURE — 80053 COMPREHEN METABOLIC PANEL: CPT | Performed by: FAMILY MEDICINE

## 2021-05-31 PROCEDURE — 63710000001 INSULIN LISPRO (HUMAN) PER 5 UNITS: Performed by: INTERNAL MEDICINE

## 2021-05-31 PROCEDURE — 82962 GLUCOSE BLOOD TEST: CPT

## 2021-05-31 PROCEDURE — 63710000001 INSULIN DETEMIR PER 5 UNITS: Performed by: FAMILY MEDICINE

## 2021-05-31 PROCEDURE — 25010000002 CEFEPIME PER 500 MG: Performed by: FAMILY MEDICINE

## 2021-05-31 PROCEDURE — 85025 COMPLETE CBC W/AUTO DIFF WBC: CPT | Performed by: FAMILY MEDICINE

## 2021-05-31 PROCEDURE — 25010000002 CEFTRIAXONE PER 250 MG: Performed by: FAMILY MEDICINE

## 2021-05-31 RX ADMIN — INSULIN LISPRO 3 UNITS: 100 INJECTION, SOLUTION INTRAVENOUS; SUBCUTANEOUS at 19:57

## 2021-05-31 RX ADMIN — SODIUM CHLORIDE 100 ML/HR: 9 INJECTION, SOLUTION INTRAVENOUS at 02:40

## 2021-05-31 RX ADMIN — SODIUM CHLORIDE, PRESERVATIVE FREE 3 ML: 5 INJECTION INTRAVENOUS at 08:19

## 2021-05-31 RX ADMIN — TAMSULOSIN HYDROCHLORIDE 0.4 MG: 0.4 CAPSULE ORAL at 19:47

## 2021-05-31 RX ADMIN — FAMOTIDINE 20 MG: 10 INJECTION INTRAVENOUS at 22:17

## 2021-05-31 RX ADMIN — LEVOTHYROXINE SODIUM 150 MCG: 150 TABLET ORAL at 05:34

## 2021-05-31 RX ADMIN — SODIUM CHLORIDE 1 G: 900 INJECTION INTRAVENOUS at 09:09

## 2021-05-31 RX ADMIN — INSULIN DETEMIR 7 UNITS: 100 INJECTION, SOLUTION SUBCUTANEOUS at 08:18

## 2021-05-31 RX ADMIN — INSULIN DETEMIR 7 UNITS: 100 INJECTION, SOLUTION SUBCUTANEOUS at 19:47

## 2021-05-31 RX ADMIN — SODIUM CHLORIDE, PRESERVATIVE FREE 3 ML: 5 INJECTION INTRAVENOUS at 19:48

## 2021-05-31 RX ADMIN — CEFEPIME HYDROCHLORIDE 1 G: 1 INJECTION, POWDER, FOR SOLUTION INTRAMUSCULAR; INTRAVENOUS at 12:49

## 2021-05-31 RX ADMIN — CEFEPIME HYDROCHLORIDE 1 G: 1 INJECTION, POWDER, FOR SOLUTION INTRAMUSCULAR; INTRAVENOUS at 19:47

## 2021-05-31 RX ADMIN — DOCUSATE SODIUM 100 MG: 100 CAPSULE ORAL at 08:19

## 2021-05-31 RX ADMIN — FAMOTIDINE 20 MG: 10 INJECTION INTRAVENOUS at 08:19

## 2021-06-01 LAB
ALBUMIN SERPL-MCNC: 2.8 G/DL (ref 3.5–5.2)
ALBUMIN/GLOB SERPL: 1 G/DL
ALP SERPL-CCNC: 110 U/L (ref 39–117)
ALT SERPL W P-5'-P-CCNC: 12 U/L (ref 1–41)
ANION GAP SERPL CALCULATED.3IONS-SCNC: 6 MMOL/L (ref 5–15)
AST SERPL-CCNC: 30 U/L (ref 1–40)
BASOPHILS # BLD AUTO: 0.03 10*3/MM3 (ref 0–0.2)
BASOPHILS NFR BLD AUTO: 0.3 % (ref 0–1.5)
BILIRUB SERPL-MCNC: 0.3 MG/DL (ref 0–1.2)
BUN SERPL-MCNC: 11 MG/DL (ref 8–23)
BUN/CREAT SERPL: 14.5 (ref 7–25)
CALCIUM SPEC-SCNC: 8.6 MG/DL (ref 8.6–10.5)
CHLORIDE SERPL-SCNC: 106 MMOL/L (ref 98–107)
CO2 SERPL-SCNC: 27 MMOL/L (ref 22–29)
CREAT SERPL-MCNC: 0.76 MG/DL (ref 0.76–1.27)
DEPRECATED RDW RBC AUTO: 38.7 FL (ref 37–54)
EOSINOPHIL # BLD AUTO: 0.06 10*3/MM3 (ref 0–0.4)
EOSINOPHIL NFR BLD AUTO: 0.6 % (ref 0.3–6.2)
ERYTHROCYTE [DISTWIDTH] IN BLOOD BY AUTOMATED COUNT: 11.8 % (ref 12.3–15.4)
GFR SERPL CREATININE-BSD FRML MDRD: 103 ML/MIN/1.73
GLOBULIN UR ELPH-MCNC: 2.7 GM/DL
GLUCOSE BLDC GLUCOMTR-MCNC: 107 MG/DL (ref 70–130)
GLUCOSE BLDC GLUCOMTR-MCNC: 119 MG/DL (ref 70–130)
GLUCOSE BLDC GLUCOMTR-MCNC: 158 MG/DL (ref 70–130)
GLUCOSE BLDC GLUCOMTR-MCNC: 164 MG/DL (ref 70–130)
GLUCOSE BLDC GLUCOMTR-MCNC: 243 MG/DL (ref 70–130)
GLUCOSE SERPL-MCNC: 113 MG/DL (ref 65–99)
HCT VFR BLD AUTO: 30.7 % (ref 37.5–51)
HGB BLD-MCNC: 10.5 G/DL (ref 13–17.7)
LYMPHOCYTES # BLD AUTO: 1.2 10*3/MM3 (ref 0.7–3.1)
LYMPHOCYTES NFR BLD AUTO: 11.1 % (ref 19.6–45.3)
MAGNESIUM SERPL-MCNC: 1.6 MG/DL (ref 1.6–2.4)
MCH RBC QN AUTO: 30.7 PG (ref 26.6–33)
MCHC RBC AUTO-ENTMCNC: 34.2 G/DL (ref 31.5–35.7)
MCV RBC AUTO: 89.8 FL (ref 79–97)
MONOCYTES # BLD AUTO: 0.9 10*3/MM3 (ref 0.1–0.9)
MONOCYTES NFR BLD AUTO: 8.3 % (ref 5–12)
NEUTROPHILS NFR BLD AUTO: 79.2 % (ref 42.7–76)
NEUTROPHILS NFR BLD AUTO: 8.57 10*3/MM3 (ref 1.7–7)
PLATELET # BLD AUTO: 147 10*3/MM3 (ref 140–450)
PMV BLD AUTO: 10.7 FL (ref 6–12)
POTASSIUM SERPL-SCNC: 3.3 MMOL/L (ref 3.5–5.2)
PROT SERPL-MCNC: 5.5 G/DL (ref 6–8.5)
RBC # BLD AUTO: 3.42 10*6/MM3 (ref 4.14–5.8)
SODIUM SERPL-SCNC: 139 MMOL/L (ref 136–145)
WBC # BLD AUTO: 10.81 10*3/MM3 (ref 3.4–10.8)

## 2021-06-01 PROCEDURE — 63710000001 INSULIN DETEMIR PER 5 UNITS: Performed by: FAMILY MEDICINE

## 2021-06-01 PROCEDURE — 25010000002 CEFEPIME PER 500 MG: Performed by: FAMILY MEDICINE

## 2021-06-01 PROCEDURE — 97162 PT EVAL MOD COMPLEX 30 MIN: CPT

## 2021-06-01 PROCEDURE — 63710000001 INSULIN LISPRO (HUMAN) PER 5 UNITS: Performed by: INTERNAL MEDICINE

## 2021-06-01 PROCEDURE — 80053 COMPREHEN METABOLIC PANEL: CPT | Performed by: FAMILY MEDICINE

## 2021-06-01 PROCEDURE — 85025 COMPLETE CBC W/AUTO DIFF WBC: CPT | Performed by: FAMILY MEDICINE

## 2021-06-01 PROCEDURE — 82962 GLUCOSE BLOOD TEST: CPT

## 2021-06-01 PROCEDURE — 83735 ASSAY OF MAGNESIUM: CPT | Performed by: FAMILY MEDICINE

## 2021-06-01 RX ORDER — LEVOFLOXACIN 500 MG/1
500 TABLET, FILM COATED ORAL DAILY
Qty: 4 TABLET | Refills: 0 | Status: SHIPPED | OUTPATIENT
Start: 2021-06-01 | End: 2021-09-14

## 2021-06-01 RX ORDER — POTASSIUM CHLORIDE 750 MG/1
40 CAPSULE, EXTENDED RELEASE ORAL ONCE
Status: COMPLETED | OUTPATIENT
Start: 2021-06-01 | End: 2021-06-01

## 2021-06-01 RX ORDER — FAMOTIDINE 20 MG/1
20 TABLET, FILM COATED ORAL
Status: DISCONTINUED | OUTPATIENT
Start: 2021-06-01 | End: 2021-06-02 | Stop reason: HOSPADM

## 2021-06-01 RX ADMIN — POTASSIUM CHLORIDE 40 MEQ: 750 CAPSULE, EXTENDED RELEASE ORAL at 11:41

## 2021-06-01 RX ADMIN — FAMOTIDINE 20 MG: 20 TABLET, FILM COATED ORAL at 17:31

## 2021-06-01 RX ADMIN — LEVOTHYROXINE SODIUM 150 MCG: 150 TABLET ORAL at 05:24

## 2021-06-01 RX ADMIN — CEFEPIME HYDROCHLORIDE 1 G: 1 INJECTION, POWDER, FOR SOLUTION INTRAMUSCULAR; INTRAVENOUS at 03:15

## 2021-06-01 RX ADMIN — CEFEPIME HYDROCHLORIDE 1 G: 1 INJECTION, POWDER, FOR SOLUTION INTRAMUSCULAR; INTRAVENOUS at 18:49

## 2021-06-01 RX ADMIN — INSULIN LISPRO 3 UNITS: 100 INJECTION, SOLUTION INTRAVENOUS; SUBCUTANEOUS at 20:21

## 2021-06-01 RX ADMIN — INSULIN LISPRO 5 UNITS: 100 INJECTION, SOLUTION INTRAVENOUS; SUBCUTANEOUS at 17:31

## 2021-06-01 RX ADMIN — SODIUM CHLORIDE 10 ML/HR: 9 INJECTION, SOLUTION INTRAVENOUS at 04:23

## 2021-06-01 RX ADMIN — SODIUM CHLORIDE, PRESERVATIVE FREE 3 ML: 5 INJECTION INTRAVENOUS at 08:52

## 2021-06-01 RX ADMIN — CEFEPIME HYDROCHLORIDE 1 G: 1 INJECTION, POWDER, FOR SOLUTION INTRAMUSCULAR; INTRAVENOUS at 11:31

## 2021-06-01 RX ADMIN — INSULIN DETEMIR 7 UNITS: 100 INJECTION, SOLUTION SUBCUTANEOUS at 20:21

## 2021-06-01 RX ADMIN — INSULIN LISPRO 3 UNITS: 100 INJECTION, SOLUTION INTRAVENOUS; SUBCUTANEOUS at 11:41

## 2021-06-01 RX ADMIN — DOCUSATE SODIUM 100 MG: 100 CAPSULE ORAL at 08:51

## 2021-06-01 RX ADMIN — SODIUM CHLORIDE, PRESERVATIVE FREE 3 ML: 5 INJECTION INTRAVENOUS at 20:20

## 2021-06-01 RX ADMIN — INSULIN DETEMIR 7 UNITS: 100 INJECTION, SOLUTION SUBCUTANEOUS at 08:51

## 2021-06-01 RX ADMIN — TAMSULOSIN HYDROCHLORIDE 0.4 MG: 0.4 CAPSULE ORAL at 20:20

## 2021-06-01 RX ADMIN — FAMOTIDINE 20 MG: 10 INJECTION INTRAVENOUS at 08:52

## 2021-06-01 NOTE — THERAPY EVALUATION
Patient Name: Hi Colbert  : 1955    MRN: [id number]                              Today's Date: 2021       Admit Date: 2021    Visit Dx:     ICD-10-CM ICD-9-CM   1. Diabetic ketoacidosis without coma associated with type 1 diabetes mellitus (CMS/HCC)  E10.10 250.13   2. Acute UTI  N39.0 599.0   3. Generalized weakness  R53.1 780.79   4. 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 (CMS/[facility])   • Unable to care for self   • Diabetic ketoacidosis without coma associated with type 1 diabetes mellitus (CMS/[facility])   • UTI (urinary tract infection) due to urinary indwelling catheter (CMS/[facility])     Past Medical History:   Diagnosis Date   • Cataract    • Diabetes mellitus (CMS/[facility])    • Falls    • Gait abnormality    • GERD (gastroesophageal reflux disease)    • Hypothyroidism    • Muscle spasm    • Nicotine dependence    • Rhabdomyolysis    • Tremor      Past Surgical History:   Procedure Laterality Date   • OTHER SURGICAL HISTORY      cyst on tailbone, left big toe     General Information     Row Name 21 1404          Physical Therapy Time and Intention    Document Type  evaluation;other (see comments) see MAR  -JE     Mode of Treatment  physical therapy  -     Row Name 21 1408          General Information    Patient Profile Reviewed  yes  -JE     Prior Level of Function  dependent:;cooking;cleaning;driving;shopping;independent:;all household mobility;ADL's;bathing;dressing;grooming;feeding uses a quad cane, 4 wheeled  walker w/ forearm support; had therapy come 3 times a week  -     Existing Precautions/Restrictions  fall  -     Barriers to Rehab  medically complex  -[facility] Name 06/01/21 1404          Living Environment    Lives With  facility resident  -[facility] Name 06/01/21 1404          Home Main Entrance    Number of Stairs, Main Entrance  none  -[facility] Name 06/01/21 1404          Stairs Within Home, Primary    Number of Stairs, Within Home, Primary  none  -[facility] Name 06/01/21 1404          Cognition    Orientation Status (Cognition)  oriented x 4;other (see comments) stated it was July 1st instead of June  -[facility] Name 06/01/21 1404          Safety Issues, Functional Mobility    Safety Issues Affecting Function (Mobility)  ability to follow commands;at risk behavior observed;awareness of need for assistance;impulsivity;insight into deficits/self-awareness;judgment;safety precaution awareness;safety precautions follow-through/compliance  -     Impairments Affecting Function (Mobility)  balance;endurance/activity tolerance;postural/trunk control;sensation/sensory awareness;strength  -[facility] Name 06/01/21 1404          Relationship/Environment    Name(s) of Who Lives With Patient  resides at [facility]  -       User Key  (r) = Recorded By, (t) = Taken By, (c) = Cosigned By    Initials Name Provider Type    Monie Barroso, PT Physical Therapist        Mobility     [facility] Name 06/01/21 1404          Bed Mobility    Bed Mobility  supine-sit  -     Supine-Sit [place] (Bed Mobility)  standby assist  -     Assistive Device (Bed Mobility)  bed rails  -[facility] Name 06/01/21 1404          Sit-Stand Transfer    Sit-Stand [place] (Transfers)  contact guard;verbal cues  -     Assistive Device (Sit-Stand Transfers)  walker, front-wheeled  -     Row Name 06/01/21 1404          Gait/Stairs (Locomotion)    [place] Level (Gait)  minimum assist (75% patient effort);contact  guard;verbal cues  -     Assistive Device (Gait)  walker, front-wheeled  -     Distance in Feet (Gait)  120 ft  -     Deviations/Abnormal Patterns (Gait)  ataxic;base of support, narrow decrease step length on L  -     Bilateral Gait Deviations  forward flexed posture  -     Comment (Gait/Stairs)  decrease control of rwx requiring assist to manage at times; veers from straight path; at times demonstrates flexed knees/hips/spine  -       User Key  (r) = Recorded By, (t) = Taken By, (c) = Cosigned By    Initials Name Provider Type    Monie Barroso, PT Physical Therapist        Obj/Interventions     Row Name 06/01/21 1400          Range of Motion Comprehensive    Comment, General Range of Motion  AROM all 4 extremities WFLs except L shld limited to ~ 50%  -     Row Name 06/01/21 1400          Strength Comprehensive (MMT)    Comment, General Manual Muscle Testing (MMT) Assessment  B UEs grossly 4+/5, R LE grossly 4+/5, L LE knee flex/extensor 4+/5, L ankle DF 4- to 3+/5, PF 4-/5, L hip flexor 3+ to 4-/5  -     Row Name 06/01/21 1400          Balance    Balance Assessment  sitting static balance;standing static balance;standing dynamic balance;sitting dynamic balance  -     Static Sitting Balance  mild impairment;unsupported;sitting, edge of bed  -     Dynamic Sitting Balance  mild impairment;moderate impairment;unsupported;sitting, edge of bed  -     Static Standing Balance  mild impairment;moderate impairment;supported;standing  -     Dynamic Standing Balance  mild impairment;moderate impairment;supported;standing  -     Row Name 06/01/21 1400          Sensory Assessment (Somatosensory)    Sensory Assessment (Somatosensory)  other (see comments) c/os numbness in his hands as well as his feet  -       User Key  (r) = Recorded By, (t) = Taken By, (c) = Cosigned By    Initials Name Provider Type    Monie Barroso, PT Physical Therapist        Goals/Plan     Row Name 06/01/21 1400           Bed Mobility Goal 1 (PT)    Activity/Assistive Device (Bed Mobility Goal 1, PT)  bed mobility activities, all  -JE     [place] Level/Cues Needed (Bed Mobility Goal 1, PT)  independent  -JE     Time Frame (Bed Mobility Goal 1, PT)  long term goal (LTG);10 days  -JE     Progress/Outcomes (Bed Mobility Goal 1, PT)  goal ongoing  -[email or web address]     Row Name 06/01/21 1400          Transfer Goal 1 (PT)    Activity/Assistive Device (Transfer Goal 1, PT)  sit-to-stand/stand-to-sit;bed-to-chair/chair-to-bed;walker, rolling  -JE     [place] Level/Cues Needed (Transfer Goal 1, PT)  standby assist  -JE     Time Frame (Transfer Goal 1, PT)  long term goal (LTG);10 days  -JE     Progress/Outcome (Transfer Goal 1, PT)  goal ongoing  -[email or web address]     Row Name 06/01/21 1400          Gait Training Goal 1 (PT)    Activity/Assistive Device (Gait Training Goal 1, PT)  gait (walking locomotion);assistive device use;decrease fall risk;diminish gait deviation;improve balance and speed;increase endurance/gait distance;walker, rolling  -JE     [place] Level (Gait Training Goal 1, PT)  contact guard assist  -JE     Distance (Gait Training Goal 1, PT)  200 ft w/ less than 1 standing rest w/ straight path, equal step length and safe use of rwx w/ proper positioning and control  -JE     Time Frame (Gait Training Goal 1, PT)  long term goal (LTG);10 days  -JE     Progress/Outcome (Gait Training Goal 1, PT)  goal ongoing  -[email or web address]     Row Name 06/01/21 1400          ROM Goal 1 (PT)    ROM Goal 1 (PT)  dynamic sitting balance I w/out any posterior/lateral lean or trunk sway  -JE     Time Frame (ROM Goal 1, PT)  long-term goal (LTG);other (see comments) 10 days  -JE     Progress/Outcome (ROM Goal 1, PT)  goal ongoing  -[email or web address]     Row Name 06/01/21 1400          Patient Education Goal (PT)    Activity (Patient Education Goal, PT)  HEP for strengthening, sitting balance; safe use of AD w/out VCs  -JE     [place]/Cues/Accuracy (Memory Goal 2, PT)   demonstrates adequately;independent;verbalizes understanding  -JE     Time Frame (Patient Education Goal, PT)  long term goal (LTG);10 days  -     Progress/Outcome (Patient Education Goal, PT)  goal ongoing  -       User Key  (r) = Recorded By, (t) = Taken By, (c) = Cosigned By    Initials Name Provider Type    Monie Barroso, PT Physical Therapist        Clinical Impression     Row Name 06/01/21 1400          Pain    Additional Documentation  Pain Scale: Numbers Pre/Post-Treatment (Group)  -     Row Name 06/01/21 1400          Pain Scale: Numbers Pre/Post-Treatment    Pretreatment Pain Rating  0/10 - no pain  -     Posttreatment Pain Rating  0/10 - no pain  -     Row Name 06/01/21 1400          Plan of Care Review    Plan of Care Reviewed With  patient  -     Progress  no change  -JE     Outcome Summary  PT eval completed.  Pt w/ decrease strength L hip/ankle compared to R, per pt due to stroke 2 yrs ago.  Pt w/ decrease sitting and standing balance requiring assist and use of rwx w/ noted posterior lean and increase trunk sway w/ activity in sitting.  Noted increase trunk sway, inconsistent step length w/ L step length decrease compared to R, veering from straight path and impuslive behavior w/ ambulation.  Pt w/ increase fall risk and demonstrates decrease safety awareness.  Pt will benefit from continued PT services to improve safety awareness, activity tolerance, strength, balance, and I w/ functional mobility reducing fall risk.  Recommend continued skilled care at discharge.  Pt reports he is planning to go home and thought he was going to go home earlier this day.  Reports if he has to stay here, he is not going to do anything, that he is just going to be a lump on a log.  Advised pt re: his decrease balance and increase fall risk and concerns for this.  Pt not interested in going for rehab in a facility or staying in our facility any longer.  Plans to go home this date.  Will follow for  needs if pt does remain in the hospital and will continued w/ skilled training.  However if pt does return home, recommend 24/7 assist and HH.  -     Row Name 06/01/21 1400          Therapy Assessment/Plan (PT)    Patient/Family Therapy Goals Statement (PT)  return home  -     Rehab Potential (PT)  fair, will monitor progress closely  -     Criteria for Skilled Interventions Met (PT)  yes;meets criteria;skilled treatment is necessary  -     Predicted Duration of Therapy Intervention (PT)  until discharge or goals achieved  -     Row Name 06/01/21 1400          Vital Signs    O2 Delivery Pre Treatment  room air  -JE     O2 Delivery Intra Treatment  room air  -JE     O2 Delivery Post Treatment  room air  -JE     Pre Patient Position  Supine  -JE     Intra Patient Position  Standing  -JE     Post Patient Position  Supine  -JE     Row Name 06/01/21 1400          Positioning and Restraints    Pre-Treatment Position  in bed  -JE     Post Treatment Position  bed  -JE     In Bed  notified nsg;fowlers;call light within reach;encouraged to call for assist;exit alarm on;with family/caregiver;side rails up x2  -       User Key  (r) = Recorded By, (t) = Taken By, (c) = Cosigned By    Initials Name Provider Type    Monie Barroso, PT Physical Therapist        Outcome Measures     Row Name 06/01/21 1400          How much help from another person do you currently need...    Turning from your back to your side while in flat bed without using bedrails?  3  -JE     Moving from lying on back to sitting on the side of a flat bed without bedrails?  3  -JE     Moving to and from a bed to a chair (including a wheelchair)?  3  -JE     Standing up from a chair using your arms (e.g., wheelchair, bedside chair)?  3  -JE     Climbing 3-5 steps with a railing?  2  -JE     To walk in hospital room?  2  -JE     AM-PAC 6 Clicks Score (PT)  16  -     Row Name 06/01/21 1400          Functional Assessment    Outcome Measure Options   AM-PAC 6 Clicks Basic Mobility (PT)  -       User Key  (r) = Recorded By, (t) = Taken By, (c) = Cosigned By    Initials Name Provider Type    Monie Barroso, PT Physical Therapist        Physical Therapy Education                 Title: PT OT SLP Therapies (In Progress)     Topic: Physical Therapy (In Progress)     Point: Mobility training (Done)     Learning Progress Summary           Patient Acceptance, E,TB,D, VU,DU,NR by TERESA at 6/1/2021 1610    Comment: Education re: purpose of PT/importance of activity; education for safety/falls prevention to include impulse control and proper use of rwx                   Point: Home exercise program (Not Started)     Learner Progress:  Not documented in this visit.          Point: Precautions (Done)     Learning Progress Summary           Patient Acceptance, E,TB,D, VU,DU,NR by TERESA at 6/1/2021 1610    Comment: Education re: purpose of PT/importance of activity; education for safety/falls prevention to include impulse control and proper use of rwx                               User Key     Initials Effective Dates Name Provider Type [facility] 08/02/18 -  Monie Mace, PT Physical Therapist PT              PT Recommendation and Plan  Planned Therapy Interventions (PT): balance training, bed mobility training, gait training, home exercise program, patient/family education, postural re-education, ROM (range of motion), strengthening, transfer training, other (see comments) (safety/falls prevention)  Plan of Care Reviewed With: patient  Progress: no change  Outcome Summary: PT eval completed.  Pt w/ decrease strength L hip/ankle compared to R, per pt due to stroke 2 yrs ago.  Pt w/ decrease sitting and standing balance requiring assist and use of rwx w/ noted posterior lean and increase trunk sway w/ activity in sitting.  Noted increase trunk sway, inconsistent step length w/ L step length decrease compared to R, veering from straight path and impuslive behavior w/  ambulation.  Pt w/ increase fall risk and demonstrates decrease safety awareness.  Pt will benefit from continued PT services to improve safety awareness, activity tolerance, strength, balance, and I w/ functional mobility reducing fall risk.  Recommend continued skilled care at discharge.  Pt reports he is planning to go home and thought he was going to go home earlier this day.  Reports if he has to stay here, he is not going to do anything, that he is just going to be a lump on a log.  Advised pt re: his decrease balance and increase fall risk and concerns for this.  Pt not interested in going for rehab in a facility or staying in our facility any longer.  Plans to go home this date.  Will follow for needs if pt does remain in the hospital and will continued w/ skilled training.  However if pt does return home, recommend 24/7 assist and HH.     Time Calculation:   PT Charges     Row Name 06/01/21 1628             Time Calculation    Start Time  1400  -      Stop Time  1455  -      Time Calculation (min)  55 min  -      PT Received On  06/01/21  -      PT Goal Re-Cert Due Date  06/11/21  -        User Key  (r) = Recorded By, (t) = Taken By, (c) = Cosigned By    Initials Name Provider Type    Monie Barroso, PT Physical Therapist        Therapy Charges for Today     Code Description Service Date Service Provider Modifiers Qty    [id number] HC PT EVAL MOD COMPLEXITY 4 6/1/2021 Monie Mace, PT GP 1          PT G-Codes  Outcome Measure Options: AM-PAC 6 Clicks Basic Mobility (PT)  AM-PAC 6 Clicks Score (PT): 16    Monie Mace PT  6/1/2021

## 2021-06-01 NOTE — PROGRESS NOTES
[facility] Medicine Services  INPATIENT PROGRESS NOTE    Patient Name: Hi Colbert  Date of Admission: 5/29/2021  Today's Date: 06/01/21  Length of Stay: 3  Primary Care Physician: Kesha Del Cid APRN    Subjective   Chief Complaint: follow up UTI    Doing well, afebrile        Review of Systems   Constitutional: Positive for fatigue. Negative for fever.   HENT: Negative for congestion and ear pain.    Eyes: Negative for redness and visual disturbance.   Respiratory: Negative for cough, shortness of breath and wheezing.    Cardiovascular: Negative for chest pain and palpitations.   Gastrointestinal: Negative for abdominal pain, diarrhea, nausea and vomiting.   Endocrine: Negative for cold intolerance and heat intolerance.   Genitourinary: Negative for dysuria and frequency.   Musculoskeletal: Negative for arthralgias and back pain.   Skin: Negative for rash and wound.   Neurological: Negative for dizziness and headaches.   Psychiatric/Behavioral: Negative for confusion. The patient is not nervous/anxious.         All pertinent negatives and positives are as above. All other systems have been reviewed and are negative unless otherwise stated.     Objective    Temp:  [98.1 °F (36.7 °C)-99.2 °F (37.3 °C)] 98.1 °F (36.7 °C)  Heart Rate:  [63-72] 63  Resp:  [18-20] 18  BP: (109-135)/(61-76) 135/76  Physical Exam  Vitals reviewed.   Constitutional:       Appearance: He is well-developed.   HENT:      Head: Normocephalic and atraumatic.      Right Ear: External ear normal.      Left Ear: External ear normal.      Nose: Nose normal.   Eyes:      General: No scleral icterus.        Right eye: No discharge.         Left eye: No discharge.      Conjunctiva/sclera: Conjunctivae normal.      Pupils: Pupils are equal, round, and reactive to light.   Neck:      Thyroid: No thyromegaly.      Trachea: No tracheal deviation.   Cardiovascular:      Rate and Rhythm: Normal rate and  regular rhythm.      Heart sounds: Normal heart sounds. No murmur heard.   No friction rub. No gallop.    Pulmonary:      Effort: Pulmonary effort is normal. No respiratory distress.      Breath sounds: Normal breath sounds. No stridor. No wheezing or rales.   Chest:      Chest wall: No tenderness.   Abdominal:      General: Bowel sounds are normal. There is no distension.      Palpations: Abdomen is soft. There is no mass.      Tenderness: There is no abdominal tenderness. There is no guarding or rebound.      Hernia: No hernia is present.   Musculoskeletal:         General: No deformity. Normal range of motion.      Cervical back: Normal range of motion and neck supple.   Lymphadenopathy:      Cervical: No cervical adenopathy.   Skin:     General: Skin is warm and dry.      Coloration: Skin is not pale.      Findings: No erythema or rash.   Neurological:      Mental Status: He is alert and oriented to person, place, and time.      Cranial Nerves: No cranial nerve deficit.      Motor: No abnormal muscle tone.      Coordination: Coordination normal.      Deep Tendon Reflexes: Reflexes are normal and symmetric. Reflexes normal.   Psychiatric:         Behavior: Behavior normal.         Thought Content: Thought content normal.         Judgment: Judgment normal.             Results Review:  I have reviewed the labs, radiology results, and diagnostic studies.    Laboratory Data:   Results from last 7 days   Lab Units 06/01/21  0551 05/31/21  0643 05/30/21  0555   WBC 10*3/mm3 10.81* 15.41* 21.42*   HEMOGLOBIN g/dL 10.5* 10.5* 11.3*   HEMATOCRIT % 30.7* 31.0* 33.0*   PLATELETS 10*3/mm3 147 135* 138*        Results from last 7 days   Lab Units 06/01/21  0551 05/31/21  0643 05/30/21  0555 05/29/21  0905   SODIUM mmol/L 139 133* 133* 136   POTASSIUM mmol/L 3.3* 3.7 4.3 3.6   CHLORIDE mmol/L 106 102 104 110*   CO2 mmol/L 27.0 23.0 20.0* 14.0*   BUN mg/dL 11 17 23 20   CREATININE mg/dL 0.76 0.84 0.96 0.80   CALCIUM mg/dL 8.6  8.7 8.2* 6.0*   BILIRUBIN mg/dL 0.3 0.3  --  0.5   ALK PHOS U/L 110 79  --  48   ALT (SGPT) U/L 12 11  --  8   AST (SGOT) U/L 30 16  --  15   GLUCOSE mg/dL 113* 162* 304* 341*       Culture Data:   Blood Culture   Date Value Ref Range Status   05/30/2021 No growth at less than 24 hours  Preliminary     Urine Culture   Date Value Ref Range Status   05/29/2021 >100,000 CFU/mL Pseudomonas aeruginosa (A)  Final       Radiology Data:   Imaging Results (Last 24 Hours)     ** No results found for the last 24 hours. **          I have reviewed the patient's current medications.     Assessment/Plan     Active Hospital Problems    Diagnosis    • UTI (urinary tract infection) due to urinary indwelling catheter (CMS/[facility])    • Diabetic ketoacidosis without coma associated with type 1 diabetes mellitus (CMS/[facility])    • Diabetic polyneuropathy associated with diabetes mellitus due to underlying condition (CMS/[facility])    • Amputated toe of left foot (CMS/[facility])        Labs reviewed:hyperglycemia, hyponatremia    Hypokalemia    Telemetry reviewed    Telemetry independently interpreted by me: NSR    1.  Pseudomonas UTI  -Change IV abx to blood cultures    2.  DKA  -resolved    3.  T2DM  -SSI    4.  Hypothyroidism  -Synthroid    5.  BPH  -Flomax  -Sanchez    6.  Hypokalemia  -PO K  -Check Mg          Discharge Planning: I expect the patient to be discharged to SNF in 1-2 days    Electronically signed by Henrry Dotson MD, 06/01/21, 16:23 CDT.

## 2021-06-01 NOTE — PROGRESS NOTES
Continued Stay Note  [facility]     Patient Name: Hi Colbert  MRN: [id number]  Today's Date: 6/1/2021    Admit Date: 5/29/2021    Discharge Plan     Row Name 06/01/21 1537       Plan    Plan Comments  Spoke to Brooke with [facility] ([phone]) to inform that pt is ready for DC. Brooke states she has to come and do an on site with pt before pt can dc. Brooke states she will be here at 830am tomorrow to do a functional needs assessment to confirm that pt still meets criteria for return to [facility].        Discharge Codes    No documentation.       Expected Discharge Date and Time     Expected Discharge Date Expected Discharge Time    Jun 1, 2021             DEJA Palomo     no yes

## 2021-06-01 NOTE — PROGRESS NOTES
Hi Colbert is a 65 y.o. male who qualifies for IV to PO therapy conversion.    Pepcid has been changed from IV to PO per System Policy.       SEJAL Hernández RP

## 2021-06-01 NOTE — DISCHARGE SUMMARY
"    [facility] Medicine Services  DISCHARGE SUMMARY       Date of Admission: 5/29/2021  Date of Discharge:  6/2/2021  Primary Care Physician: Kesha Del Cid APRN    Presenting Problem/History of Present Illness:  Diabetic ketoacidosis without coma associated with type 1 diabetes mellitus (CMS/[facility]) [E10.10]     Final Discharge Diagnoses:  Active Hospital Problems    Diagnosis    • UTI (urinary tract infection) due to urinary indwelling catheter (CMS/[facility])    • Diabetic ketoacidosis without coma associated with type 1 diabetes mellitus (CMS/[facility])    • Diabetic polyneuropathy associated with diabetes mellitus due to underlying condition (CMS/[facility])    • Amputated toe of left foot (CMS/HCC)    1.  Pseudomonas UTI  -Change IV abx to blood cultures     2.  DKA  -resolved     3.  T2DM  -SSI     4.  Hypothyroidism  -Synthroid     5.  BPH  -Flomax  -Sanchez       Consults: n/a    Procedures Performed: n/a    Pertinent Test Results:   n/a      Chief Complaint on Day of Discharge: \"I want to go home.\"    History of Present Illness on Day of Discharge:   Doing well, afebrile, no n/v/d.  Wants to go home.      Hospital Course:  The patient is a 65 y.o. male who presented to [facility] with weakness.  He was found to be in DKA.  He was treated with an insulin drip and IV Fluids.  He was also found to have a UTI.  He was treated with IV abx.  Urine culture grew Pseudomonas.  He has been switched to culture specific antibiotics.  He is doing well.  He is afebrile.  He is eating and drinking well.      He wants to go home.  I offered to have him stay for another day or 2 to have therapy evaluate him.  He says he is getting around as well as he was before and has help at the assisted living facility.  He would like to go ahead and go home today.    He is comfortable with being discharged and with the discharge plan.  He was given the chance to ask questions and all questions were " "answered to his satisfaction.        Condition on Discharge:  stable    Physical Exam on Discharge:  /75 (BP Location: Right arm, Patient Position: Lying)   Pulse 67   Temp 98.9 °F (37.2 °C) (Oral)   Resp 18   Ht 182.9 cm (72\")   Wt (!) 184 kg (404 lb 12.2 oz)   SpO2 95%   BMI 54.90 kg/m²   Physical Exam  Vitals reviewed.   Constitutional:       Appearance: He is well-developed.   HENT:      Head: Normocephalic and atraumatic.      Right Ear: External ear normal.      Left Ear: External ear normal.      Nose: Nose normal.   Eyes:      General: No scleral icterus.        Right eye: No discharge.         Left eye: No discharge.      Conjunctiva/sclera: Conjunctivae normal.      Pupils: Pupils are equal, round, and reactive to light.   Neck:      Thyroid: No thyromegaly.      Trachea: No tracheal deviation.   Cardiovascular:      Rate and Rhythm: Normal rate and regular rhythm.      Heart sounds: Normal heart sounds. No murmur heard.   No friction rub. No gallop.    Pulmonary:      Effort: Pulmonary effort is normal. No respiratory distress.      Breath sounds: Normal breath sounds. No stridor. No wheezing or rales.   Chest:      Chest wall: No tenderness.   Abdominal:      General: Bowel sounds are normal. There is no distension.      Palpations: Abdomen is soft. There is no mass.      Tenderness: There is no abdominal tenderness. There is no guarding or rebound.      Hernia: No hernia is present.   Musculoskeletal:         General: No deformity. Normal range of motion.      Cervical back: Normal range of motion and neck supple.   Lymphadenopathy:      Cervical: No cervical adenopathy.   Skin:     General: Skin is warm and dry.      Coloration: Skin is not pale.      Findings: No erythema or rash.   Neurological:      Mental Status: He is alert and oriented to person, place, and time.      Cranial Nerves: No cranial nerve deficit.      Motor: No abnormal muscle tone.      Coordination: Coordination " normal.      Deep Tendon Reflexes: Reflexes are normal and symmetric. Reflexes normal.   Psychiatric:         Behavior: Behavior normal.         Thought Content: Thought content normal.         Judgment: Judgment normal.           Discharge Disposition:  [place]-Health Care [facility]    Discharge Medications:     Discharge Medications      New Medications      Instructions Start Date   levoFLOXacin 500 MG tablet  Commonly known as: Levaquin   500 mg, Oral, Daily         Continue These Medications      Instructions Start Date   acetaminophen 325 MG tablet  Commonly known as: TYLENOL   650 mg, Oral, Every 4 Hours PRN      Dexcom G6  device   1 each, Does not apply, Take As Directed      Dexcom G6 Sensor   Does not apply, Every 10 Days      Dexcom G6 Transmitter misc   1 each, Does not apply, Take As Directed      docusate sodium 100 MG capsule  Commonly known as: COLACE   100 mg, Oral, 2 Times Daily      ibuprofen 200 MG tablet  Commonly known as: ADVIL,MOTRIN   600 mg, Oral, Every 6 Hours PRN      Lantus SoloStar 100 UNIT/ML injection pen  Generic drug: Insulin Glargine   9 Units, Subcutaneous, Nightly      levothyroxine 150 MCG tablet  Commonly known as: SYNTHROID, LEVOTHROID   150 mcg, Oral, Daily      NovoLOG FlexPen 100 UNIT/ML solution pen-injector sc pen  Generic drug: insulin aspart   4 Units, Subcutaneous, 3 Times Daily, With meals sliding scale      potassium chloride 20 MEQ CR tablet  Commonly known as: KLOR-CON   20 mEq, Oral, Daily      tamsulosin 0.4 MG capsule 24 hr capsule  Commonly known as: FLOMAX   0.4 mg, Oral, Nightly      vitamin D 1.25 MG (71838 UT) capsule capsule  Commonly known as: ERGOCALCIFEROL   50,000 Units, Oral, Daily             Discharge Diet: regular    Activity at Discharge: as tolerated    Discharge Care Plan/Instructions:   Go to ER for fever or confusion    Follow-up Appointments:   Future Appointments   Date Time Provider Department Center   6/3/2021 10:30 AM Kesha Del Cid  [facility]   7/22/2021  3:00 PM Tee Gonsales APRN [facility]       Test Results Pending at Discharge: n/a    Electronically signed by Henrry Dotson MD, 06/02/21, 11:24 CDT.    Time: 35 minutes

## 2021-06-02 ENCOUNTER — READMISSION MANAGEMENT (OUTPATIENT)
Dept: CALL CENTER | Facility: HOSPITAL | Age: 66
End: 2021-06-02

## 2021-06-02 ENCOUNTER — HOME HEALTH ADMISSION (OUTPATIENT)
Dept: HOME HEALTH SERVICES | Facility: HOME HEALTHCARE | Age: 66
End: 2021-06-02

## 2021-06-02 VITALS
DIASTOLIC BLOOD PRESSURE: 75 MMHG | TEMPERATURE: 98.9 F | OXYGEN SATURATION: 95 % | HEIGHT: 72 IN | RESPIRATION RATE: 18 BRPM | WEIGHT: 315 LBS | BODY MASS INDEX: 42.66 KG/M2 | HEART RATE: 67 BPM | SYSTOLIC BLOOD PRESSURE: 131 MMHG

## 2021-06-02 LAB
ALBUMIN SERPL-MCNC: 2.9 G/DL (ref 3.5–5.2)
ALBUMIN/GLOB SERPL: 0.9 G/DL
ALP SERPL-CCNC: 66 U/L (ref 39–117)
ALT SERPL W P-5'-P-CCNC: 10 U/L (ref 1–41)
ANION GAP SERPL CALCULATED.3IONS-SCNC: 7 MMOL/L (ref 5–15)
AST SERPL-CCNC: 17 U/L (ref 1–40)
BASOPHILS # BLD AUTO: 0.04 10*3/MM3 (ref 0–0.2)
BASOPHILS NFR BLD AUTO: 0.4 % (ref 0–1.5)
BILIRUB SERPL-MCNC: 0.2 MG/DL (ref 0–1.2)
BUN SERPL-MCNC: 9 MG/DL (ref 8–23)
BUN/CREAT SERPL: 10.6 (ref 7–25)
CALCIUM SPEC-SCNC: 8.8 MG/DL (ref 8.6–10.5)
CHLORIDE SERPL-SCNC: 102 MMOL/L (ref 98–107)
CO2 SERPL-SCNC: 29 MMOL/L (ref 22–29)
CREAT SERPL-MCNC: 0.85 MG/DL (ref 0.76–1.27)
DEPRECATED RDW RBC AUTO: 38.8 FL (ref 37–54)
EOSINOPHIL # BLD AUTO: 0.14 10*3/MM3 (ref 0–0.4)
EOSINOPHIL NFR BLD AUTO: 1.6 % (ref 0.3–6.2)
ERYTHROCYTE [DISTWIDTH] IN BLOOD BY AUTOMATED COUNT: 11.8 % (ref 12.3–15.4)
GFR SERPL CREATININE-BSD FRML MDRD: 90 ML/MIN/1.73
GLOBULIN UR ELPH-MCNC: 3.2 GM/DL
GLUCOSE BLDC GLUCOMTR-MCNC: 107 MG/DL (ref 70–130)
GLUCOSE BLDC GLUCOMTR-MCNC: 249 MG/DL (ref 70–130)
GLUCOSE BLDC GLUCOMTR-MCNC: 58 MG/DL (ref 70–130)
GLUCOSE BLDC GLUCOMTR-MCNC: 63 MG/DL (ref 70–130)
GLUCOSE BLDC GLUCOMTR-MCNC: 76 MG/DL (ref 70–130)
GLUCOSE SERPL-MCNC: 77 MG/DL (ref 65–99)
HCT VFR BLD AUTO: 32.4 % (ref 37.5–51)
HGB BLD-MCNC: 11.1 G/DL (ref 13–17.7)
IMM GRANULOCYTES # BLD AUTO: 0.1 10*3/MM3 (ref 0–0.05)
IMM GRANULOCYTES NFR BLD AUTO: 1.1 % (ref 0–0.5)
LYMPHOCYTES # BLD AUTO: 1.28 10*3/MM3 (ref 0.7–3.1)
LYMPHOCYTES NFR BLD AUTO: 14.3 % (ref 19.6–45.3)
MCH RBC QN AUTO: 30.8 PG (ref 26.6–33)
MCHC RBC AUTO-ENTMCNC: 34.3 G/DL (ref 31.5–35.7)
MCV RBC AUTO: 90 FL (ref 79–97)
MONOCYTES # BLD AUTO: 0.96 10*3/MM3 (ref 0.1–0.9)
MONOCYTES NFR BLD AUTO: 10.7 % (ref 5–12)
NEUTROPHILS NFR BLD AUTO: 6.44 10*3/MM3 (ref 1.7–7)
NEUTROPHILS NFR BLD AUTO: 71.9 % (ref 42.7–76)
NRBC BLD AUTO-RTO: 0 /100 WBC (ref 0–0.2)
PLATELET # BLD AUTO: 173 10*3/MM3 (ref 140–450)
PMV BLD AUTO: 10.5 FL (ref 6–12)
POTASSIUM SERPL-SCNC: 3.4 MMOL/L (ref 3.5–5.2)
PROT SERPL-MCNC: 6.1 G/DL (ref 6–8.5)
RBC # BLD AUTO: 3.6 10*6/MM3 (ref 4.14–5.8)
SODIUM SERPL-SCNC: 138 MMOL/L (ref 136–145)
WBC # BLD AUTO: 8.96 10*3/MM3 (ref 3.4–10.8)

## 2021-06-02 PROCEDURE — 85025 COMPLETE CBC W/AUTO DIFF WBC: CPT | Performed by: FAMILY MEDICINE

## 2021-06-02 PROCEDURE — 97530 THERAPEUTIC ACTIVITIES: CPT

## 2021-06-02 PROCEDURE — 63710000001 INSULIN LISPRO (HUMAN) PER 5 UNITS: Performed by: INTERNAL MEDICINE

## 2021-06-02 PROCEDURE — 82962 GLUCOSE BLOOD TEST: CPT

## 2021-06-02 PROCEDURE — 25010000002 CEFEPIME PER 500 MG: Performed by: FAMILY MEDICINE

## 2021-06-02 PROCEDURE — 63710000001 INSULIN DETEMIR PER 5 UNITS: Performed by: FAMILY MEDICINE

## 2021-06-02 PROCEDURE — 80053 COMPREHEN METABOLIC PANEL: CPT | Performed by: FAMILY MEDICINE

## 2021-06-02 RX ORDER — POTASSIUM CHLORIDE 750 MG/1
40 CAPSULE, EXTENDED RELEASE ORAL ONCE
Status: COMPLETED | OUTPATIENT
Start: 2021-06-02 | End: 2021-06-02

## 2021-06-02 RX ADMIN — POTASSIUM CHLORIDE 40 MEQ: 750 CAPSULE, EXTENDED RELEASE ORAL at 12:12

## 2021-06-02 RX ADMIN — FAMOTIDINE 20 MG: 20 TABLET, FILM COATED ORAL at 08:49

## 2021-06-02 RX ADMIN — INSULIN DETEMIR 7 UNITS: 100 INJECTION, SOLUTION SUBCUTANEOUS at 08:46

## 2021-06-02 RX ADMIN — CEFEPIME HYDROCHLORIDE 1 G: 1 INJECTION, POWDER, FOR SOLUTION INTRAMUSCULAR; INTRAVENOUS at 12:04

## 2021-06-02 RX ADMIN — LEVOTHYROXINE SODIUM 150 MCG: 150 TABLET ORAL at 05:35

## 2021-06-02 RX ADMIN — INSULIN LISPRO 5 UNITS: 100 INJECTION, SOLUTION INTRAVENOUS; SUBCUTANEOUS at 12:04

## 2021-06-02 RX ADMIN — CEFEPIME HYDROCHLORIDE 1 G: 1 INJECTION, POWDER, FOR SOLUTION INTRAMUSCULAR; INTRAVENOUS at 02:49

## 2021-06-02 NOTE — OUTREACH NOTE
Prep Survey      Responses   [facility] discharged [facility]?  [facility]   Is LACE score < 7 ?  No   Emergency Room discharge w/ pulse ox?  No   Eligibility  [facility]   Date of Admission  05/29/21   Date of Discharge  06/02/21   Discharge Disposition  Home-Health Care Sv   Discharge diagnosis  DKA, UTI   Does the patient have one of the following disease processes/diagnoses(primary or secondary)?  Other   Does the patient have Home health ordered?  Yes   What is the Home health agency?   [facility]   Is there a DME ordered?  No   General alerts for this patient  lives at  [facility]   Prep survey completed?  Yes          Cynthia Villagomez RN

## 2021-06-02 NOTE — THERAPY TREATMENT NOTE
"Acute Care - Physical Therapy Treatment Note  [facility]     Patient Name: Hi Colbert  : 1955  MRN: [id number]  Today's Date: 2021           PT Assessment (last 12 hours)      PT Evaluation and Treatment     Row Name 21 1011          Physical Therapy Time and Intention    Subjective Information  no complaints  -NW     Document Type  therapy note (daily note)  -NW     Mode of Treatment  physical therapy  -NW     Comment  pt agitated and not interested in doing PT  -NW     Row Name 21 1011          General Information    Existing Precautions/Restrictions  fall  -NW     Row Name 21 1011          Bed Mobility    Bed Mobility  sit-supine  -NW     Supine-Sit [place] (Bed Mobility)  supervision  -NW     Sit-Supine [place] (Bed Mobility)  modified independence  -NW     Comment (Bed Mobility)  (S) pt adamently refused OOB, this is so stupid im not gettng up until i can get dressed and get all this stuff disconnected and off of me! explained importance of activity but pt reports he didn't care right now he had 3 therapist where he was going and will be fine!\" Nsg notified and came to encourage pt OOB pt begrudgingly agree was able to get pt to EOB and then pt reports once again this is so stupid IM NOT GETTING UP!!! and he proceeded to lay back down. cont to encourage activity and pt was not intetested!  -NW     Row Name 21 1011          Positioning and Restraints    Pre-Treatment Position  in bed  -NW     Post Treatment Position  bed  -NW     In Bed  fowlers;call light within reach;encouraged to call for assist;exit alarm on;notified nsg  -NW       User Key  (r) = Recorded By, (t) = Taken By, (c) = Cosigned By    Initials Name Provider Type    NW Trixie Stephens, PTA Physical Therapy Assistant        Physical Therapy Education                 Title: PT OT SLP Therapies (In Progress)     Topic: Physical Therapy (In Progress)     Point: Mobility training (Done)     Learning " Progress Summary           Patient Acceptance, E,TB,D, VU,DU,NR by  at 6/1/2021 1610    Comment: Education re: purpose of PT/importance of activity; education for safety/falls prevention to include impulse control and proper use of rwx                   Point: Home exercise program (Not Started)     Learner Progress:  Not documented in this visit.          Point: Precautions (Done)     Learning Progress Summary           Patient Acceptance, E,TB,D, VU,DU,NR by  at 6/1/2021 1610    Comment: Education re: purpose of PT/importance of activity; education for safety/falls prevention to include impulse control and proper use of rwx                               User Key     Initials Effective Dates Name Provider Type Discipline     08/02/18 -  Monie Mace, PT Physical Therapist PT              PT Recommendation and Plan             Time Calculation:         PT G-Codes  Outcome Measure Options: AM-PAC 6 Clicks Basic Mobility (PT)  AM-PAC 6 Clicks Score (PT): 16    Trixie Stephens, PTA  6/2/2021

## 2021-06-02 NOTE — PROGRESS NOTES
Continued Stay Note  [facility]     Patient Name: Hi Colbert  MRN: [id number]  Today's Date: 6/2/2021    Admit Date: 5/29/2021    Discharge Plan     Row Name 06/02/21 1244       Plan    Final Discharge Disposition Code  06 - home with home health care    Final Note  Pt is being dcd back to [facility] today. [facility] has been arranged for [place] [facility] and therapy. No other dc needs identified.        Discharge Codes    No documentation.       Expected Discharge Date and Time     Expected Discharge Date Expected Discharge Time    Jun 2, 2021             DEJA Palomo

## 2021-06-02 NOTE — THERAPY DISCHARGE NOTE
Acute Care - Physical Therapy Discharge Summary  [facility]       Patient Name: Hi Colbert  : 1955  MRN: [id number]    Today's Date: 2021                 Admit Date: 2021      PT Recommendation and Plan    Visit Dx:    ICD-10-CM ICD-9-CM   1. Diabetic ketoacidosis without coma associated with type 1 diabetes mellitus (CMS/HCC)  E10.10 250.13   2. Acute UTI  N39.0 599.0   3. Generalized weakness  R53.1 780.79   4. Gait abnormality  R26.9 781.2           PT Charges     Row Name 21 1026             Time Calculation    Start Time  1011  -NW      Stop Time  1027  -NW      Time Calculation (min)  16 min  -NW      PT Received On  21  -NW      PT Goal Re-Cert Due Date  21  -NW         Time Calculation- PT    Total Timed Code Minutes- PT  16 minute(s)  -NW        User Key  (r) = Recorded By, (t) = Taken By, (c) = Cosigned By    Initials Name Provider Type    Trixie Knapp, PTA Physical Therapy Assistant          PT Rehab Goals     Row Name 21 1504             Bed Mobility Goal 1 (PT)    Activity/Assistive Device (Bed Mobility Goal 1, PT)  bed mobility activities, all  -AB      [place] Level/Cues Needed (Bed Mobility Goal 1, PT)  independent  -AB      Time Frame (Bed Mobility Goal 1, PT)  long term goal (LTG);10 days  -AB      Progress/Outcomes (Bed Mobility Goal 1, PT)  goal not met  -AB         Transfer Goal 1 (PT)    Activity/Assistive Device (Transfer Goal 1, PT)  sit-to-stand/stand-to-sit;bed-to-chair/chair-to-bed;walker, rolling  -AB      [place] Level/Cues Needed (Transfer Goal 1, PT)  standby assist  -AB      Time Frame (Transfer Goal 1, PT)  long term goal (LTG);10 days  -AB      Progress/Outcome (Transfer Goal 1, PT)  goal not met  -AB         Gait Training Goal 1 (PT)    Activity/Assistive Device (Gait Training Goal 1, PT)  gait (walking locomotion);assistive device use;decrease fall risk;diminish gait deviation;improve balance and speed;increase  endurance/gait distance;walker, rolling  -AB      [place] Level (Gait Training Goal 1, PT)  contact guard assist  -AB      Distance (Gait Training Goal 1, PT)  200 ft w/ less than 1 standing rest w/ straight path, equal step length and safe use of rwx w/ proper positioning and control  -AB      Time Frame (Gait Training Goal 1, PT)  long term goal (LTG);10 days  -AB      Progress/Outcome (Gait Training Goal 1, PT)  goal not met  -AB         ROM Goal 1 (PT)    ROM Goal 1 (PT)  dynamic sitting balance I w/out any posterior/lateral lean or trunk sway  -AB      Time Frame (ROM Goal 1, PT)  long-term goal (LTG);other (see comments) 10 days  -AB      Progress/Outcome (ROM Goal 1, PT)  goal not met  -AB         Patient Education Goal (PT)    Activity (Patient Education Goal, PT)  HEP for strengthening, sitting balance; safe use of AD w/out VCs  -AB      [place]/Cues/Accuracy (Memory Goal 2, PT)  demonstrates adequately;independent;verbalizes understanding  -AB      Time Frame (Patient Education Goal, PT)  long term goal (LTG);10 days  -AB      Progress/Outcome (Patient Education Goal, PT)  goal partially met  -AB        User Key  (r) = Recorded By, (t) = Taken By, (c) = Cosigned By    Initials Name Provider Type Discipline    La Aparicio PTA Physical Therapy Assistant PT              PT Discharge Summary  Anticipated Discharge Disposition (PT): home with assist  Reason for Discharge: Discharge from facility  Outcomes Achieved: Refer to plan of care for updates on goals achieved  Discharge Destination: Home with assist      La Stern PTA   6/2/2021

## 2021-06-02 NOTE — NURSING NOTE
Brooke with [facility] performed cognitive needs and he has passed. She will allow him to come back to .

## 2021-06-03 ENCOUNTER — TRANSITIONAL CARE MANAGEMENT TELEPHONE ENCOUNTER (OUTPATIENT)
Dept: CALL CENTER | Facility: HOSPITAL | Age: 66
End: 2021-06-03

## 2021-06-03 ENCOUNTER — OFFICE VISIT (OUTPATIENT)
Dept: UROLOGY | Facility: CLINIC | Age: 66
End: 2021-06-03

## 2021-06-03 ENCOUNTER — OFFICE VISIT (OUTPATIENT)
Dept: INTERNAL MEDICINE | Facility: CLINIC | Age: 66
End: 2021-06-03

## 2021-06-03 ENCOUNTER — HOSPITAL ENCOUNTER (OUTPATIENT)
Dept: GENERAL RADIOLOGY | Facility: HOSPITAL | Age: 66
Discharge: HOME | End: 2021-06-03
Admitting: UROLOGY

## 2021-06-03 VITALS — TEMPERATURE: 98 F | BODY MASS INDEX: 23.16 KG/M2 | WEIGHT: 171 LBS | HEIGHT: 72 IN

## 2021-06-03 VITALS
HEART RATE: 80 BPM | HEIGHT: 72 IN | OXYGEN SATURATION: 98 % | WEIGHT: 171 LBS | DIASTOLIC BLOOD PRESSURE: 60 MMHG | TEMPERATURE: 97.1 F | SYSTOLIC BLOOD PRESSURE: 116 MMHG | BODY MASS INDEX: 23.16 KG/M2

## 2021-06-03 DIAGNOSIS — Z09 HOSPITAL DISCHARGE FOLLOW-UP: Primary | ICD-10-CM

## 2021-06-03 DIAGNOSIS — R31.0 GROSS HEMATURIA: ICD-10-CM

## 2021-06-03 DIAGNOSIS — R35.0 URINE FREQUENCY: ICD-10-CM

## 2021-06-03 DIAGNOSIS — N20.0 KIDNEY STONE: Primary | ICD-10-CM

## 2021-06-03 DIAGNOSIS — E10.3393 TYPE 1 DIABETES MELLITUS WITH MODERATE NONPROLIFERATIVE RETINOPATHY OF BOTH EYES WITHOUT MACULAR EDEMA (HCC): ICD-10-CM

## 2021-06-03 DIAGNOSIS — R29.6 MULTIPLE FALLS: ICD-10-CM

## 2021-06-03 DIAGNOSIS — E10.10 DIABETIC KETOACIDOSIS WITHOUT COMA ASSOCIATED WITH TYPE 1 DIABETES MELLITUS (HCC): ICD-10-CM

## 2021-06-03 DIAGNOSIS — B95.2 UTI (URINARY TRACT INFECTION) DUE TO ENTEROCOCCUS: ICD-10-CM

## 2021-06-03 DIAGNOSIS — N39.0 UTI (URINARY TRACT INFECTION) DUE TO ENTEROCOCCUS: ICD-10-CM

## 2021-06-03 PROBLEM — L60.0 INGROWN NAIL: Status: ACTIVE | Noted: 2019-05-28

## 2021-06-03 PROBLEM — M77.42 METATARSALGIA OF BOTH FEET: Status: ACTIVE | Noted: 2018-03-07

## 2021-06-03 PROBLEM — M77.41 METATARSALGIA OF BOTH FEET: Status: ACTIVE | Noted: 2018-03-07

## 2021-06-03 PROCEDURE — 1111F DSCHRG MED/CURRENT MED MERGE: CPT | Performed by: NURSE PRACTITIONER

## 2021-06-03 PROCEDURE — 99204 OFFICE O/P NEW MOD 45 MIN: CPT | Performed by: UROLOGY

## 2021-06-03 PROCEDURE — 99496 TRANSJ CARE MGMT HIGH F2F 7D: CPT | Performed by: NURSE PRACTITIONER

## 2021-06-03 PROCEDURE — 74018 RADEX ABDOMEN 1 VIEW: CPT

## 2021-06-03 RX ORDER — TAMSULOSIN HYDROCHLORIDE 0.4 MG/1
1 CAPSULE ORAL NIGHTLY
Qty: 90 CAPSULE | Refills: 3 | Status: SHIPPED | OUTPATIENT
Start: 2021-06-03 | End: 2021-07-23 | Stop reason: SDUPTHER

## 2021-06-03 NOTE — PROGRESS NOTES
Subjective    Mr. Colbert is 65 y.o. male    Chief Complaint: Kidney stone    History of Present Illness  65-year-old male new patient with diabetes referred for history of 3 mm left UVJ stone on CT scan 5/24/2021.  He was admitted for DKA 5/29/2021 to 6/2/2021.  He also had Sanchez catheter placed for apparent urinary retention.  He does not recall having difficulty voiding previously.  KUB x-ray done today reviewed by me with the patient shows no evidence for ureteral stone.  He does have remaining bilateral 3 to 4 mm nephroliths.  He denies flank pain.  He does report history of having an isolated episode of gross hematuria.  He also has worsening bothersome LUTS.  Urine culture from 5/29/2021 grew pansensitive Pseudomonas.    I independently visualized and reviewed the patient's prior imaging studies today in clinic and discussed the imaging findings with the patient.      The following portions of the patient's history were reviewed and updated as appropriate: allergies, current medications, past family history, past medical history, past social history, past surgical history and problem list.    Review of Systems      Current Outpatient Medications:   •  acetaminophen (TYLENOL) 325 MG tablet, Take 2 tablets by mouth Every 4 (Four) Hours As Needed for Moderate Pain ., Disp: 500 tablet, Rfl: 3  •  Continuous Blood Gluc  (Dexcom G6 ) device, 1 each Take As Directed., Disp: 1 each, Rfl: 0  •  Continuous Blood Gluc Sensor (Dexcom G6 Sensor), Every 10 (Ten) Days., Disp: 9 each, Rfl: 3  •  Continuous Blood Gluc Transmit (Dexcom G6 Transmitter) misc, 1 each Take As Directed., Disp: 1 each, Rfl: 0  •  docusate sodium (COLACE) 100 MG capsule, Take 1 capsule by mouth 2 (Two) Times a Day., Disp: 15 capsule, Rfl: 0  •  ibuprofen (ADVIL,MOTRIN) 200 MG tablet, Take 3 tablets by mouth Every 6 (Six) Hours As Needed for Moderate Pain ., Disp: 500 tablet, Rfl: 3  •  insulin aspart (NovoLOG FlexPen) 100 UNIT/ML  solution pen-injector sc pen, Inject 4 Units under the skin into the appropriate area as directed 3 (Three) Times a Day. With meals sliding scale (Patient taking differently: Inject 4 Units under the skin into the appropriate area as directed 3 (Three) Times a Day. With meals sliding scale: 151-200 - 2 UNITS 201-250 - 4 UNITS 251-300 - 6 UNITS 301-350 - 8 UNITS 351-400 - 10 UNITS 401 + - 12 UNITS AND CONTACT THE MD), Disp: 6 pen, Rfl: 3  •  Insulin Glargine (Lantus SoloStar) 100 UNIT/ML injection pen, Inject 9 Units under the skin into the appropriate area as directed Every Night., Disp: 3 pen, Rfl: 3  •  levoFLOXacin (Levaquin) 500 MG tablet, Take 1 tablet by mouth Daily., Disp: 4 tablet, Rfl: 0  •  levothyroxine (SYNTHROID, LEVOTHROID) 150 MCG tablet, Take 1 tablet by mouth Daily., Disp: 90 tablet, Rfl: 1  •  potassium chloride (KLOR-CON) 20 MEQ CR tablet, Take 1 tablet by mouth Daily., Disp: 90 tablet, Rfl: 3  •  tamsulosin (FLOMAX) 0.4 MG capsule 24 hr capsule, Take 1 capsule by mouth Every Night. (Patient not taking: Reported on 6/4/2021), Disp: 30 capsule, Rfl: 0  •  vitamin D (ERGOCALCIFEROL) 1.25 MG (55324 UT) capsule capsule, Take 50,000 Units by mouth Daily., Disp: , Rfl:   •  Cholecalciferol 1.25 MG (30559 UT) tablet, Take 1.25 mg by mouth 1 (One) Time Per Week. ADMINISTER EVERY WEDNESDAY, Disp: , Rfl:   •  Glucagon, rDNA, (Glucagon Emergency) 1 MG kit, Inject 1 mg as directed As Needed (AS NEEDED FOR LOW SUGAR)., Disp: , Rfl:   •  tamsulosin (FLOMAX) 0.4 MG capsule 24 hr capsule, Take 1 capsule by mouth Every Night., Disp: 90 capsule, Rfl: 3    Past Medical History:   Diagnosis Date   • Cataract    • Diabetes mellitus (CMS/HCC)    • Falls    • Gait abnormality    • GERD (gastroesophageal reflux disease)    • Hypothyroidism    • Muscle spasm    • Nicotine dependence    • Rhabdomyolysis    • Tremor        Past Surgical History:   Procedure Laterality Date   • OTHER SURGICAL HISTORY      cyst on tailbone,  "left big toe       Social History     Socioeconomic History   • Marital status: Single     Spouse name: Not on file   • Number of children: Not on file   • Years of education: Not on file   • Highest education level: Not on file   Tobacco Use   • Smoking status: Current Every Day Smoker     Types: Cigarettes   • Smokeless tobacco: Never Used   • Tobacco comment: 16-17 cigs a day   Substance and Sexual Activity   • Alcohol use: Never   • Sexual activity: Defer       Family History   Problem Relation Age of Onset   • No Known Problems Mother    • Alzheimer's disease Father        Objective    Temp 98 °F (36.7 °C)   Ht 182.9 cm (72\")   Wt 77.6 kg (171 lb)   BMI 23.19 kg/m²     Physical Exam  He is sitting in a wheelchair no apparent distress.  His Sanchez catheter was removed today.      Results for orders placed or performed during the hospital encounter of 05/29/21   COVID-19,[facility] IN-HOUSE,Nasal Swab No Transport Media 3-4 HR TAT - Swab, Nasal Cavity    Specimen: Nasal Cavity; Swab   Result Value Ref Range    COVID19 Not Detected Not Detected - Ref. Range   Urine Culture - Urine, Urine, Clean Catch    Specimen: Urine, Clean Catch   Result Value Ref Range    Urine Culture >100,000 CFU/mL Pseudomonas aeruginosa (A)        Susceptibility    Pseudomonas aeruginosa - CRICKET     Cefepime 4 Susceptible ug/ml     Ceftazidime 4 Susceptible ug/ml     Ciprofloxacin <=0.25 Susceptible ug/ml     Gentamicin <=1 Susceptible ug/ml     Levofloxacin 1 Susceptible ug/ml     Piperacillin + Tazobactam 8 Susceptible ug/ml   Blood Culture With NELSON - Blood, Arm, Right    Specimen: Arm, Right; Blood   Result Value Ref Range    Blood Culture No growth at 5 days    Comprehensive Metabolic Panel    Specimen: Blood   Result Value Ref Range    Glucose 341 (H) 65 - 99 mg/dL    BUN 20 8 - 23 mg/dL    Creatinine 0.80 0.76 - 1.27 mg/dL    Sodium 136 136 - 145 mmol/L    Potassium 3.6 3.5 - 5.2 mmol/L    Chloride 110 (H) 98 - 107 mmol/L    CO2 14.0 " (L) 22.0 - 29.0 mmol/L    Calcium 6.0 (L) 8.6 - 10.5 mg/dL    Total Protein 4.3 (L) 6.0 - 8.5 g/dL    Albumin 2.40 (L) 3.50 - 5.20 g/dL    ALT (SGPT) 8 1 - 41 U/L    AST (SGOT) 15 1 - 40 U/L    Alkaline Phosphatase 48 39 - 117 U/L    Total Bilirubin 0.5 0.0 - 1.2 mg/dL    eGFR Non African Amer 97 >60 mL/min/1.73    Globulin 1.9 gm/dL    A/G Ratio 1.3 g/dL    BUN/Creatinine Ratio 25.0 7.0 - 25.0    Anion Gap 12.0 5.0 - 15.0 mmol/L   Urinalysis With Culture If Indicated - Urine, Clean Catch    Specimen: Urine, Clean Catch   Result Value Ref Range    Color, UA Yellow Yellow, Straw    Appearance, UA Clear Clear    pH, UA 5.5 5.0 - 8.0    Specific Gravity, UA 1.028 1.005 - 1.030    Glucose, UA >=1000 mg/dL (3+) (A) Negative    Ketones, UA 80 mg/dL (3+) (A) Negative    Bilirubin, UA Negative Negative    Blood, UA Large (3+) (A) Negative    Protein, UA 30 mg/dL (1+) (A) Negative    Leuk Esterase, UA Small (1+) (A) Negative    Nitrite, UA Positive (A) Negative    Urobilinogen, UA 0.2 E.U./dL 0.2 - 1.0 E.U./dL   Acetone    Specimen: Blood   Result Value Ref Range    Acetone Small (A) Negative   CBC Auto Differential    Specimen: Blood   Result Value Ref Range    WBC 20.42 (H) 3.40 - 10.80 10*3/mm3    RBC 3.15 (L) 4.14 - 5.80 10*6/mm3    Hemoglobin 9.7 (L) 13.0 - 17.7 g/dL    Hematocrit 29.0 (L) 37.5 - 51.0 %    MCV 92.1 79.0 - 97.0 fL    MCH 30.8 26.6 - 33.0 pg    MCHC 33.4 31.5 - 35.7 g/dL    RDW 12.0 (L) 12.3 - 15.4 %    RDW-SD 40.7 37.0 - 54.0 fl    MPV 10.2 6.0 - 12.0 fL    Platelets 116 (L) 140 - 450 10*3/mm3   Manual Differential    Specimen: Blood   Result Value Ref Range    Neutrophil % 80.0 (H) 42.7 - 76.0 %    Lymphocyte % 7.0 (L) 19.6 - 45.3 %    Monocyte % 5.0 5.0 - 12.0 %    Bands %  8.0 (H) 0.0 - 5.0 %    Neutrophils Absolute 17.97 (H) 1.70 - 7.00 10*3/mm3    Lymphocytes Absolute 1.43 0.70 - 3.10 10*3/mm3    Monocytes Absolute 1.02 (H) 0.10 - 0.90 10*3/mm3    Poikilocytes Slight/1+ None Seen    WBC Morphology  Normal Normal    Platelet Estimate Decreased Normal   Urinalysis, Microscopic Only - Urine, Clean Catch    Specimen: Urine, Clean Catch   Result Value Ref Range    RBC, UA 13-20 (A) None Seen /HPF    WBC, UA 31-50 (A) None Seen /HPF    Bacteria, UA Trace (A) None Seen /HPF    Squamous Epithelial Cells, UA 0-2 None Seen, 0-2 /HPF    Hyaline Casts, UA None Seen None Seen /LPF    Methodology Automated Microscopy    Lactic Acid, Plasma    Specimen: Blood   Result Value Ref Range    Lactate 1.4 0.5 - 2.0 mmol/L   Blood Gas, Arterial -    Specimen: Arterial Blood   Result Value Ref Range    Site Right Radial     Kennedy's Test Positive     pH, Arterial 7.358 7.350 - 7.450 pH units    pCO2, Arterial 31.2 (L) 35.0 - 45.0 mm Hg    pO2, Arterial 75.9 (L) 83.0 - 108.0 mm Hg    HCO3, Arterial 17.5 (L) 20.0 - 26.0 mmol/L    Base Excess, Arterial -6.9 (L) 0.0 - 2.0 mmol/L    O2 Saturation, Arterial 95.0 94.0 - 99.0 %    Temperature 37.0 C    Barometric Pressure for Blood Gas 752 mmHg    Modality Room Air     Ventilator Mode NA     Collected by [id number]     pCO2, Temperature Corrected 31.2 (L) 35 - 45 mm Hg    pH, Temp Corrected 7.358 7.350 - 7.450 pH Units    pO2, Temperature Corrected 75.9 (L) 83 - 108 mm Hg   Phosphorus    Specimen: Blood   Result Value Ref Range    Phosphorus 2.6 2.5 - 4.5 mg/dL   Magnesium    Specimen: Blood   Result Value Ref Range    Magnesium 1.1 (L) 1.6 - 2.4 mg/dL   Osmolality, Serum    Specimen: Blood   Result Value Ref Range    Osmolality 296 280 - 301 mOsm/kg   Hemoglobin A1c    Specimen: Blood   Result Value Ref Range    Hemoglobin A1C 7.90 (H) 4.80 - 5.60 %   Basic Metabolic Panel    Specimen: Blood   Result Value Ref Range    Glucose 328 (H) 65 - 99 mg/dL    BUN 26 (H) 8 - 23 mg/dL    Creatinine 1.14 0.76 - 1.27 mg/dL    Sodium 133 (L) 136 - 145 mmol/L    Potassium 4.0 3.5 - 5.2 mmol/L    Chloride 101 98 - 107 mmol/L    CO2 18.0 (L) 22.0 - 29.0 mmol/L    Calcium 8.8 8.6 - 10.5 mg/dL    eGFR Non African  Amer 64 >60 mL/min/1.73    BUN/Creatinine Ratio 22.8 7.0 - 25.0    Anion Gap 14.0 5.0 - 15.0 mmol/L   Basic Metabolic Panel    Specimen: Blood   Result Value Ref Range    Glucose 122 (H) 65 - 99 mg/dL    BUN 24 (H) 8 - 23 mg/dL    Creatinine 1.00 0.76 - 1.27 mg/dL    Sodium 135 (L) 136 - 145 mmol/L    Potassium 4.0 3.5 - 5.2 mmol/L    Chloride 106 98 - 107 mmol/L    CO2 20.0 (L) 22.0 - 29.0 mmol/L    Calcium 8.7 8.6 - 10.5 mg/dL    eGFR Non African Amer 75 >60 mL/min/1.73    BUN/Creatinine Ratio 24.0 7.0 - 25.0    Anion Gap 9.0 5.0 - 15.0 mmol/L   Magnesium    Specimen: Blood   Result Value Ref Range    Magnesium 1.7 1.6 - 2.4 mg/dL   Magnesium    Specimen: Blood   Result Value Ref Range    Magnesium 1.6 1.6 - 2.4 mg/dL   Phosphorus    Specimen: Blood   Result Value Ref Range    Phosphorus 2.9 2.5 - 4.5 mg/dL   Phosphorus    Specimen: Blood   Result Value Ref Range    Phosphorus 1.5 (C) 2.5 - 4.5 mg/dL   Basic Metabolic Panel    Specimen: Blood   Result Value Ref Range    Glucose 200 (H) 65 - 99 mg/dL    BUN 23 8 - 23 mg/dL    Creatinine 0.94 0.76 - 1.27 mg/dL    Sodium 135 (L) 136 - 145 mmol/L    Potassium 4.1 3.5 - 5.2 mmol/L    Chloride 105 98 - 107 mmol/L    CO2 18.0 (L) 22.0 - 29.0 mmol/L    Calcium 8.2 (L) 8.6 - 10.5 mg/dL    eGFR Non African Amer 80 >60 mL/min/1.73    BUN/Creatinine Ratio 24.5 7.0 - 25.0    Anion Gap 12.0 5.0 - 15.0 mmol/L   Magnesium    Specimen: Blood   Result Value Ref Range    Magnesium 1.6 1.6 - 2.4 mg/dL   Phosphorus    Specimen: Blood   Result Value Ref Range    Phosphorus 4.2 2.5 - 4.5 mg/dL   Basic Metabolic Panel    Specimen: Blood   Result Value Ref Range    Glucose 280 (H) 65 - 99 mg/dL    BUN 25 (H) 8 - 23 mg/dL    Creatinine 0.97 0.76 - 1.27 mg/dL    Sodium 134 (L) 136 - 145 mmol/L    Potassium 4.2 3.5 - 5.2 mmol/L    Chloride 104 98 - 107 mmol/L    CO2 19.0 (L) 22.0 - 29.0 mmol/L    Calcium 8.4 (L) 8.6 - 10.5 mg/dL    eGFR Non African Amer 77 >60 mL/min/1.73    BUN/Creatinine  Ratio 25.8 (H) 7.0 - 25.0    Anion Gap 11.0 5.0 - 15.0 mmol/L   Magnesium    Specimen: Blood   Result Value Ref Range    Magnesium 1.7 1.6 - 2.4 mg/dL   Phosphorus    Specimen: Blood   Result Value Ref Range    Phosphorus 3.3 2.5 - 4.5 mg/dL   Basic Metabolic Panel    Specimen: Blood   Result Value Ref Range    Glucose 242 (H) 65 - 99 mg/dL    BUN 23 8 - 23 mg/dL    Creatinine 0.93 0.76 - 1.27 mg/dL    Sodium 134 (L) 136 - 145 mmol/L    Potassium 4.5 3.5 - 5.2 mmol/L    Chloride 104 98 - 107 mmol/L    CO2 18.0 (L) 22.0 - 29.0 mmol/L    Calcium 8.3 (L) 8.6 - 10.5 mg/dL    eGFR Non African Amer 81 >60 mL/min/1.73    BUN/Creatinine Ratio 24.7 7.0 - 25.0    Anion Gap 12.0 5.0 - 15.0 mmol/L   Magnesium    Specimen: Blood   Result Value Ref Range    Magnesium 1.7 1.6 - 2.4 mg/dL   Phosphorus    Specimen: Blood   Result Value Ref Range    Phosphorus 3.3 2.5 - 4.5 mg/dL   PTH, Intact    Specimen: Blood   Result Value Ref Range    PTH, Intact 35.4 15.0 - 65.0 pg/mL   TSH    Specimen: Blood   Result Value Ref Range    TSH 5.960 (H) 0.270 - 4.200 uIU/mL   Lipid Panel    Specimen: Blood   Result Value Ref Range    Total Cholesterol 92 0 - 200 mg/dL    Triglycerides 87 0 - 150 mg/dL    HDL Cholesterol 48 40 - 60 mg/dL    LDL Cholesterol  27 0 - 100 mg/dL    VLDL Cholesterol 17 5 - 40 mg/dL    LDL/HDL Ratio 0.55    CBC Auto Differential    Specimen: Blood   Result Value Ref Range    WBC 21.42 (H) 3.40 - 10.80 10*3/mm3    RBC 3.65 (L) 4.14 - 5.80 10*6/mm3    Hemoglobin 11.3 (L) 13.0 - 17.7 g/dL    Hematocrit 33.0 (L) 37.5 - 51.0 %    MCV 90.4 79.0 - 97.0 fL    MCH 31.0 26.6 - 33.0 pg    MCHC 34.2 31.5 - 35.7 g/dL    RDW 12.0 (L) 12.3 - 15.4 %    RDW-SD 39.7 37.0 - 54.0 fl    MPV 10.2 6.0 - 12.0 fL    Platelets 138 (L) 140 - 450 10*3/mm3   Basic Metabolic Panel    Specimen: Blood   Result Value Ref Range    Glucose 304 (H) 65 - 99 mg/dL    BUN 23 8 - 23 mg/dL    Creatinine 0.96 0.76 - 1.27 mg/dL    Sodium 133 (L) 136 - 145 mmol/L     Potassium 4.3 3.5 - 5.2 mmol/L    Chloride 104 98 - 107 mmol/L    CO2 20.0 (L) 22.0 - 29.0 mmol/L    Calcium 8.2 (L) 8.6 - 10.5 mg/dL    eGFR Non African Amer 78 >60 mL/min/1.73    BUN/Creatinine Ratio 24.0 7.0 - 25.0    Anion Gap 9.0 5.0 - 15.0 mmol/L   Magnesium    Specimen: Blood   Result Value Ref Range    Magnesium 1.8 1.6 - 2.4 mg/dL   Phosphorus    Specimen: Blood   Result Value Ref Range    Phosphorus 3.1 2.5 - 4.5 mg/dL   Manual Differential    Specimen: Blood   Result Value Ref Range    Neutrophil % 82.2 (H) 42.7 - 76.0 %    Lymphocyte % 8.9 (L) 19.6 - 45.3 %    Monocyte % 5.0 5.0 - 12.0 %    Bands %  4.0 0.0 - 5.0 %    Neutrophils Absolute 18.45 (H) 1.70 - 7.00 10*3/mm3    Lymphocytes Absolute 1.91 0.70 - 3.10 10*3/mm3    Monocytes Absolute 1.07 (H) 0.10 - 0.90 10*3/mm3    Poikilocytes Slight/1+ None Seen    Polychromasia Slight/1+ None Seen    WBC Morphology Normal Normal    Platelet Morphology Normal Normal   T3, Free    Specimen: Blood   Result Value Ref Range    T3, Free 0.58 (L) 2.00 - 4.40 pg/mL   Comprehensive Metabolic Panel    Specimen: Blood   Result Value Ref Range    Glucose 162 (H) 65 - 99 mg/dL    BUN 17 8 - 23 mg/dL    Creatinine 0.84 0.76 - 1.27 mg/dL    Sodium 133 (L) 136 - 145 mmol/L    Potassium 3.7 3.5 - 5.2 mmol/L    Chloride 102 98 - 107 mmol/L    CO2 23.0 22.0 - 29.0 mmol/L    Calcium 8.7 8.6 - 10.5 mg/dL    Total Protein 5.6 (L) 6.0 - 8.5 g/dL    Albumin 2.70 (L) 3.50 - 5.20 g/dL    ALT (SGPT) 11 1 - 41 U/L    AST (SGOT) 16 1 - 40 U/L    Alkaline Phosphatase 79 39 - 117 U/L    Total Bilirubin 0.3 0.0 - 1.2 mg/dL    eGFR Non African Amer 91 >60 mL/min/1.73    Globulin 2.9 gm/dL    A/G Ratio 0.9 g/dL    BUN/Creatinine Ratio 20.2 7.0 - 25.0    Anion Gap 8.0 5.0 - 15.0 mmol/L   CBC Auto Differential    Specimen: Blood   Result Value Ref Range    WBC 15.41 (H) 3.40 - 10.80 10*3/mm3    RBC 3.42 (L) 4.14 - 5.80 10*6/mm3    Hemoglobin 10.5 (L) 13.0 - 17.7 g/dL    Hematocrit 31.0 (L)  37.5 - 51.0 %    MCV 90.6 79.0 - 97.0 fL    MCH 30.7 26.6 - 33.0 pg    MCHC 33.9 31.5 - 35.7 g/dL    RDW 12.0 (L) 12.3 - 15.4 %    RDW-SD 39.9 37.0 - 54.0 fl    MPV 11.0 6.0 - 12.0 fL    Platelets 135 (L) 140 - 450 10*3/mm3    Neutrophil % 83.3 (H) 42.7 - 76.0 %    Lymphocyte % 9.0 (L) 19.6 - 45.3 %    Monocyte % 6.0 5.0 - 12.0 %    Eosinophil % 0.3 0.3 - 6.2 %    Basophil % 0.2 0.0 - 1.5 %    Neutrophils, Absolute 12.83 (H) 1.70 - 7.00 10*3/mm3    Lymphocytes, Absolute 1.39 0.70 - 3.10 10*3/mm3    Monocytes, Absolute 0.92 (H) 0.10 - 0.90 10*3/mm3    Eosinophils, Absolute 0.05 0.00 - 0.40 10*3/mm3    Basophils, Absolute 0.03 0.00 - 0.20 10*3/mm3   Comprehensive Metabolic Panel    Specimen: Blood   Result Value Ref Range    Glucose 113 (H) 65 - 99 mg/dL    BUN 11 8 - 23 mg/dL    Creatinine 0.76 0.76 - 1.27 mg/dL    Sodium 139 136 - 145 mmol/L    Potassium 3.3 (L) 3.5 - 5.2 mmol/L    Chloride 106 98 - 107 mmol/L    CO2 27.0 22.0 - 29.0 mmol/L    Calcium 8.6 8.6 - 10.5 mg/dL    Total Protein 5.5 (L) 6.0 - 8.5 g/dL    Albumin 2.80 (L) 3.50 - 5.20 g/dL    ALT (SGPT) 12 1 - 41 U/L    AST (SGOT) 30 1 - 40 U/L    Alkaline Phosphatase 110 39 - 117 U/L    Total Bilirubin 0.3 0.0 - 1.2 mg/dL    eGFR Non African Amer 103 >60 mL/min/1.73    Globulin 2.7 gm/dL    A/G Ratio 1.0 g/dL    BUN/Creatinine Ratio 14.5 7.0 - 25.0    Anion Gap 6.0 5.0 - 15.0 mmol/L   CBC Auto Differential    Specimen: Blood   Result Value Ref Range    WBC 10.81 (H) 3.40 - 10.80 10*3/mm3    RBC 3.42 (L) 4.14 - 5.80 10*6/mm3    Hemoglobin 10.5 (L) 13.0 - 17.7 g/dL    Hematocrit 30.7 (L) 37.5 - 51.0 %    MCV 89.8 79.0 - 97.0 fL    MCH 30.7 26.6 - 33.0 pg    MCHC 34.2 31.5 - 35.7 g/dL    RDW 11.8 (L) 12.3 - 15.4 %    RDW-SD 38.7 37.0 - 54.0 fl    MPV 10.7 6.0 - 12.0 fL    Platelets 147 140 - 450 10*3/mm3    Neutrophil % 79.2 (H) 42.7 - 76.0 %    Lymphocyte % 11.1 (L) 19.6 - 45.3 %    Monocyte % 8.3 5.0 - 12.0 %    Eosinophil % 0.6 0.3 - 6.2 %    Basophil % 0.3  0.0 - 1.5 %    Neutrophils, Absolute 8.57 (H) 1.70 - 7.00 10*3/mm3    Lymphocytes, Absolute 1.20 0.70 - 3.10 10*3/mm3    Monocytes, Absolute 0.90 0.10 - 0.90 10*3/mm3    Eosinophils, Absolute 0.06 0.00 - 0.40 10*3/mm3    Basophils, Absolute 0.03 0.00 - 0.20 10*3/mm3   Magnesium    Specimen: Blood   Result Value Ref Range    Magnesium 1.6 1.6 - 2.4 mg/dL   Comprehensive Metabolic Panel    Specimen: Blood   Result Value Ref Range    Glucose 77 65 - 99 mg/dL    BUN 9 8 - 23 mg/dL    Creatinine 0.85 0.76 - 1.27 mg/dL    Sodium 138 136 - 145 mmol/L    Potassium 3.4 (L) 3.5 - 5.2 mmol/L    Chloride 102 98 - 107 mmol/L    CO2 29.0 22.0 - 29.0 mmol/L    Calcium 8.8 8.6 - 10.5 mg/dL    Total Protein 6.1 6.0 - 8.5 g/dL    Albumin 2.90 (L) 3.50 - 5.20 g/dL    ALT (SGPT) 10 1 - 41 U/L    AST (SGOT) 17 1 - 40 U/L    Alkaline Phosphatase 66 39 - 117 U/L    Total Bilirubin 0.2 0.0 - 1.2 mg/dL    eGFR Non African Amer 90 >60 mL/min/1.73    Globulin 3.2 gm/dL    A/G Ratio 0.9 g/dL    BUN/Creatinine Ratio 10.6 7.0 - 25.0    Anion Gap 7.0 5.0 - 15.0 mmol/L   CBC Auto Differential    Specimen: Blood   Result Value Ref Range    WBC 8.96 3.40 - 10.80 10*3/mm3    RBC 3.60 (L) 4.14 - 5.80 10*6/mm3    Hemoglobin 11.1 (L) 13.0 - 17.7 g/dL    Hematocrit 32.4 (L) 37.5 - 51.0 %    MCV 90.0 79.0 - 97.0 fL    MCH 30.8 26.6 - 33.0 pg    MCHC 34.3 31.5 - 35.7 g/dL    RDW 11.8 (L) 12.3 - 15.4 %    RDW-SD 38.8 37.0 - 54.0 fl    MPV 10.5 6.0 - 12.0 fL    Platelets 173 140 - 450 10*3/mm3    Neutrophil % 71.9 42.7 - 76.0 %    Lymphocyte % 14.3 (L) 19.6 - 45.3 %    Monocyte % 10.7 5.0 - 12.0 %    Eosinophil % 1.6 0.3 - 6.2 %    Basophil % 0.4 0.0 - 1.5 %    Immature Grans % 1.1 (H) 0.0 - 0.5 %    Neutrophils, Absolute 6.44 1.70 - 7.00 10*3/mm3    Lymphocytes, Absolute 1.28 0.70 - 3.10 10*3/mm3    Monocytes, Absolute 0.96 (H) 0.10 - 0.90 10*3/mm3    Eosinophils, Absolute 0.14 0.00 - 0.40 10*3/mm3    Basophils, Absolute 0.04 0.00 - 0.20 10*3/mm3     Immature Grans, Absolute 0.10 (H) 0.00 - 0.05 10*3/mm3    nRBC 0.0 0.0 - 0.2 /100 WBC   POC Glucose Once    Specimen: Blood   Result Value Ref Range    Glucose 405 (C) 70 - 130 mg/dL   POC Glucose Once    Specimen: Blood   Result Value Ref Range    Glucose 416 (C) 70 - 130 mg/dL   POC Glucose Once    Specimen: Blood   Result Value Ref Range    Glucose 416 (C) 70 - 130 mg/dL   POC Glucose Once    Specimen: Blood   Result Value Ref Range    Glucose 412 (C) 70 - 130 mg/dL   POC Glucose Once    Specimen: Blood   Result Value Ref Range    Glucose 293 (H) 70 - 130 mg/dL   POC Glucose Once    Specimen: Blood   Result Value Ref Range    Glucose 228 (H) 70 - 130 mg/dL   POC Glucose Once    Specimen: Blood   Result Value Ref Range    Glucose 161 (H) 70 - 130 mg/dL   POC Glucose Once    Specimen: Blood   Result Value Ref Range    Glucose 115 70 - 130 mg/dL   POC Glucose Once    Specimen: Blood   Result Value Ref Range    Glucose 115 70 - 130 mg/dL   POC Glucose Once    Specimen: Blood   Result Value Ref Range    Glucose 129 70 - 130 mg/dL   POC Glucose Once    Specimen: Blood   Result Value Ref Range    Glucose 137 (H) 70 - 130 mg/dL   POC Glucose Once    Specimen: Blood   Result Value Ref Range    Glucose 219 (H) 70 - 130 mg/dL   POC Glucose Once    Specimen: Blood   Result Value Ref Range    Glucose 233 (H) 70 - 130 mg/dL   POC Glucose Once    Specimen: Blood   Result Value Ref Range    Glucose 262 (H) 70 - 130 mg/dL   POC Glucose Once    Specimen: Blood   Result Value Ref Range    Glucose 340 (H) 70 - 130 mg/dL   POC Glucose Once    Specimen: Blood   Result Value Ref Range    Glucose 119 70 - 130 mg/dL   POC Glucose Once    Specimen: Blood   Result Value Ref Range    Glucose 133 (H) 70 - 130 mg/dL   POC Glucose Once    Specimen: Blood   Result Value Ref Range    Glucose 128 70 - 130 mg/dL   POC Glucose Once    Specimen: Blood   Result Value Ref Range    Glucose 182 (H) 70 - 130 mg/dL   POC Glucose Once    Specimen: Blood    Result Value Ref Range    Glucose 107 70 - 130 mg/dL   POC Glucose Once    Specimen: Blood   Result Value Ref Range    Glucose 119 70 - 130 mg/dL   POC Glucose Once    Specimen: Blood   Result Value Ref Range    Glucose 158 (H) 70 - 130 mg/dL   POC Glucose Once    Specimen: Blood   Result Value Ref Range    Glucose 243 (H) 70 - 130 mg/dL   POC Glucose Once    Specimen: Blood   Result Value Ref Range    Glucose 164 (H) 70 - 130 mg/dL   POC Glucose Once    Specimen: Blood   Result Value Ref Range    Glucose 58 (L) 70 - 130 mg/dL   POC Glucose Once    Specimen: Blood   Result Value Ref Range    Glucose 63 (L) 70 - 130 mg/dL   POC Glucose Once    Specimen: Blood   Result Value Ref Range    Glucose 76 70 - 130 mg/dL   POC Glucose Once    Specimen: Blood   Result Value Ref Range    Glucose 107 70 - 130 mg/dL   POC Glucose Once    Specimen: Blood   Result Value Ref Range    Glucose 249 (H) 70 - 130 mg/dL   ECG 12 Lead   Result Value Ref Range    QT Interval 342 ms    QTC Interval 418 ms     Assessment and Plan    Diagnoses and all orders for this visit:    1. Kidney stone (Primary)  -     US Renal Bilateral; Future    2. Urine frequency  -     tamsulosin (FLOMAX) 0.4 MG capsule 24 hr capsule; Take 1 capsule by mouth Every Night.  Dispense: 90 capsule; Refill: 3    3. Gross hematuria      Likely passed/missed ureteral stone.  He does have remaining bilateral asymptomatic nephrolithiasis.  I recommended continuing tamsulosin for his history of retention and history of UTI.  His Sanchez catheter was removed today.  Given his history of hematuria, he will follow-up with me in August for cystoscopy in office with preclinic renal ultrasound or sooner as needed.      This document has been signed by LINDEN Marie MD on June 4, 2021 12:52 CDT

## 2021-06-03 NOTE — PROGRESS NOTES
"Chief Complaint  Hospital Follow Up Visit (Diabetes)    Subjective          Hi Colbert presents to [facility] PRIMARY CARE  Mr. Colbert present to the office today for hospital follow up. He was admitted to [facility] on 5/29 and discharged 6/2. He presented to the hospital with UTI, kidney stone, and DKA. Patient was treated with insulin drip, electrolyte replacement, and iv fluids. His urine cultured pseudomonoas and discharged with oral levaquin. Prior to discharge, they offered to have him stay for 2 more days for physical and occupational therapy assessment. However, he was ready to get back home. He was discharged with home health and has an assessment with them tomorrow.    Patient's blood sugars were in the 400's upon admission and he reported fatigue and confusion. Today his last 24 hour glucose reading on dexcom shows range between 80's-160. They changed his meal time bolus to 4 units with sliding scale insulin. He had a couple episodes of low blood sugars around 2-3 am, into the 40's and required protein snacks. Prior to his hospitalization he had noted some hypoglycemia at night and had had his bolus insulins decreased numerous times. He reports diet varies most days. He reports avoiding sweets and cutting back on carbohydrates. He states that [facility] does not have \"diabetic diets\". He is scheduled to establish with new endocrinologist.      Objective   Vital Signs:   /60 (BP Location: Left arm)   Pulse 80   Temp 97.1 °F (36.2 °C)   Ht 182.9 cm (72\")   Wt 77.6 kg (171 lb)   SpO2 98%   BMI 23.19 kg/m²     Physical Exam  Vitals and nursing note reviewed.   Constitutional:       General: He is not in acute distress.     Appearance: Normal appearance. He is well-developed.   HENT:      Head: Normocephalic and atraumatic.      Right Ear: Hearing and external ear normal.      Left Ear: Hearing and external ear normal.      Nose: Nose normal.   Eyes:      General: Visual " field deficit present.      Extraocular Movements: Extraocular movements intact.      Conjunctiva/sclera: Conjunctivae normal.      Pupils: Pupils are equal, round, and reactive to light.   Cardiovascular:      Rate and Rhythm: Normal rate and regular rhythm.      Pulses: Normal pulses.      Heart sounds: Normal heart sounds.   Pulmonary:      Effort: Pulmonary effort is normal.      Breath sounds: Normal breath sounds.   Abdominal:      General: Bowel sounds are normal.      Palpations: Abdomen is soft.   Musculoskeletal:         General: Normal range of motion.      Cervical back: Normal range of motion and neck supple. No rigidity. No muscular tenderness.      Right lower leg: No edema.      Left lower leg: No edema.   Skin:     General: Skin is warm and dry.      Capillary Refill: Capillary refill takes less than 2 seconds.   Neurological:      General: No focal deficit present.      Mental Status: He is alert and oriented to person, place, and time.      Motor: Weakness present.      Coordination: Coordination is intact.      Gait: Gait abnormal.      Deep Tendon Reflexes:      Reflex Scores:       Tricep reflexes are 2+ on the right side and 1+ on the left side.       Patellar reflexes are 2+ on the right side and 1+ on the left side.     Comments: Chronic-Left sided weakness related to prior stroke; 3/4 left  strength and lower extremity strength   Psychiatric:         Attention and Perception: Attention normal.         Mood and Affect: Mood normal.         Speech: Speech normal.         Behavior: Behavior normal. Behavior is cooperative.         Cognition and Memory: He exhibits impaired recent memory.        Result Review :   The following data was reviewed by: ELANA Copeland on 06/03/2021:  Common labs    Common Labsle 5/31/21 5/31/21 6/1/21 6/1/21 6/2/21 6/2/21    0643 0643 0551 0551 0447 0447   Glucose  162 (A)  113 (A)  77   BUN  17  11  9   Creatinine  0.84  0.76  0.85   eGFR Non African Am   91  103  90   Sodium  133 (A)  139  138   Potassium  3.7  3.3 (A)  3.4 (A)   Chloride  102  106  102   Calcium  8.7  8.6  8.8   Albumin  2.70 (A)  2.80 (A)  2.90 (A)   Total Bilirubin  0.3  0.3  0.2   Alkaline Phosphatase  79  110  66   AST (SGOT)  16  30  17   ALT (SGPT)  11  12  10   WBC 15.41 (A)  10.81 (A)  8.96    Hemoglobin 10.5 (A)  10.5 (A)  11.1 (A)    Hematocrit 31.0 (A)  30.7 (A)  32.4 (A)    Platelets 135 (A)  147  173    (A) Abnormal value            Data reviewed: Recent hospitalization notes [facility] Hospitalization from 5/29-6/2          Assessment and Plan    Diagnoses and all orders for this visit:    1. Hospital discharge follow-up (Primary)    2. Multiple falls    3. Diabetic ketoacidosis without coma associated with type 1 diabetes mellitus (CMS/[facility])    4. 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 UTI (urinary tract infection) due to Enterococcus      I spent 30 minutes caring for Hi on this date of service. This time includes time spent by me in the following activities:preparing for the visit, reviewing tests, obtaining and/or reviewing a separately obtained history, performing a medically appropriate examination and/or evaluation , counseling and educating the patient/family/caregiver, ordering medications, tests, or procedures, documenting information in the medical record and independently interpreting results and communicating that information with the patient/family/caregiver     Follow Up     No follow-ups on file.     He will continue with 4/4/4 bolus with mealtime sliding scale. Will need to consider lantus dosing changed to AM instead of PM if continued to have hypoglycemia at night. He will continue to avoid concentrated sweets, reduce carbohydrates, eliminate sugar drinks, and increase hydration.    He will see Dr. Marie today for kidney stone and BPH. Will complete imaging to rule out presence of stone.  Will remove catheter today if indicated. Continue UTI treatment; completed 1/5 days.    Home health will be assessing patient tomorrow. Will need to continue physical therapy and occupational therapy.    Will continue vitamin B 12 injection. Will resend to pharmacy to be administered to him at Nursing Home.      Patient was given instructions and counseling regarding his condition or for health maintenance advice. Please see specific information pulled into the AVS if appropriate.

## 2021-06-03 NOTE — OUTREACH NOTE
Call Center TCM Note      Responses   [facility] patient discharged from?  [facility]   Does the patient have one of the following disease processes/diagnoses(primary or secondary)?  Other   TCM attempt successful?  Yes   Call start time  1442   Call Status Comments  has a completed hospital f/u appt documented   Call end time  1442   Discharge diagnosis  DKA, UTI          Kathleen Vaz RN    6/3/2021, 14:42 EDT

## 2021-06-04 ENCOUNTER — HOME CARE VISIT (OUTPATIENT)
Dept: HOME HEALTH SERVICES | Facility: CLINIC | Age: 66
End: 2021-06-04

## 2021-06-04 VITALS
DIASTOLIC BLOOD PRESSURE: 60 MMHG | SYSTOLIC BLOOD PRESSURE: 112 MMHG | OXYGEN SATURATION: 98 % | TEMPERATURE: 98.7 F | RESPIRATION RATE: 16 BRPM | BODY MASS INDEX: 23.68 KG/M2 | HEART RATE: 70 BPM | WEIGHT: 174.6 LBS

## 2021-06-04 LAB — BACTERIA SPEC AEROBE CULT: NORMAL

## 2021-06-04 PROCEDURE — G0299 HHS/HOSPICE OF RN EA 15 MIN: HCPCS

## 2021-06-04 NOTE — HOME HEALTH
Pt/cg agreeable to homecare admission under the care of ELANA Villa. Admission agreement and safety plan reviewed and signed by the pt. Medication reconciliation completed and no issues found. No recent falls and no upcoming insurance changes. Pt had no further questions regarding medication and/or plan of care.   There was no need to contact the MD.  I spoke with the pt's brother, Elia Colbert, and he said that the pt isn't receiving any outpatient services.

## 2021-06-07 ENCOUNTER — HOME CARE VISIT (OUTPATIENT)
Dept: HOME HEALTH SERVICES | Facility: CLINIC | Age: 66
End: 2021-06-07

## 2021-06-07 ENCOUNTER — HOME CARE VISIT (OUTPATIENT)
Dept: HOME HEALTH SERVICES | Facility: HOME HEALTHCARE | Age: 66
End: 2021-06-07

## 2021-06-07 ENCOUNTER — TELEPHONE (OUTPATIENT)
Dept: INTERNAL MEDICINE | Facility: CLINIC | Age: 66
End: 2021-06-07

## 2021-06-07 VITALS
WEIGHT: 168.8 LBS | BODY MASS INDEX: 22.89 KG/M2 | OXYGEN SATURATION: 97 % | SYSTOLIC BLOOD PRESSURE: 130 MMHG | TEMPERATURE: 98.5 F | DIASTOLIC BLOOD PRESSURE: 70 MMHG | RESPIRATION RATE: 16 BRPM | HEART RATE: 69 BPM

## 2021-06-07 PROCEDURE — G0495 RN CARE TRAIN/EDU IN HH: HCPCS

## 2021-06-07 NOTE — CASE COMMUNICATION
SOC HUDDLE NOTE    Problems identified: DIABETIC, IMPAIRED MOBILITY, SMOKER    Disease processes: TYPE 1 DM, NEUROPATHY, RECENT DKA AND UTI  Acuity and severity: LEVEL 3  Medication Management: [facility] MANAGES  Care procedures: NONE    Caregiver Status: THE PT LIVES AT [facility]. [facility] STAFF MANAGE THE PT'S MEDICATIONS AND DAILY CARE. THE PT'S BROTHER EUGENIE MUNGUIA MAKES SURE THE PT GETS TO HIS APPOINTMENTS AND HELPS WITH HIS CARE. THE PT'S NEPHEW, LEVI MUNGUIA, MANAGES THE PT'S PAPERWORK, MEDICAL INFORMATION, AND IS THE PT'S POA.  Availability: 24/7  Ability to meet patient's needs: ABLE  Willingness: WILLING    Risk for Hospitalization: MODERATE    Patient's stated goal(s): PT STATED HIS GOAL IS TO WALK BETTER AND STAY OUT OF THE HOSPITAL.    Services required to achieve goals: SN, PT    Potential Issues for goal attainment: NONE    Functional status and safety: FALL RISK    Mobility: WALKER    Self-care:     ADLs: NEEDS SOME ASSIST    Environmental issues? NONE  Physical environment: ASSISTED LIVING ENVIRONMENT    Devices for care present in the home: WALKER, CANE, WHEELCHAIR, BR EQUIPMENT  Devices needed and not present: NONE NEEDED    Community resources involved/needed:   Dialysis: NO  Transportation: NO  Meals on Wheels: NO    Any additional needs:    Based upon record review and collaboration conference, the recommended frequency for this patient is:    SN - 1W5  PT - PENDING EVAL    Please note that above is a recommendation only and that the plan of care should reflect the patient's clinical needs and goals. If in agreement, please complete note. If a different frequency is necessary, please explain in note box and proceed per patients clinical needs.

## 2021-06-07 NOTE — HOME HEALTH
The pt asked if his APRN was going to change when he took his Insulin Glargine, because thought the APRN was going to have him start taking it in the AM. He currently takes it a night. I contacted the office of ELANA Copeland to verify when she wanted him to take it. I spoke with Chioma at the  and she stated she would send her a message and they would call me back. I am currently waiting for a call back.

## 2021-06-07 NOTE — TELEPHONE ENCOUNTER
Alyssa called from [facility] about Hi's insulin directions.  The directions say for him to inject the insulin nightly, but he told the nurse that when he came to see Kesha last week, she changed it.  He states that Kesha told him to inject 9 units under the skin in the morning, but there is not documentation stating for him to do so.     Alyssa would just like clarification as to whether or not it needs to be administered in the am or pm.  Please advise.

## 2021-06-07 NOTE — TELEPHONE ENCOUNTER
Please see Kesha's plan at last OV and call pt and make sure he understands her insutructions.  I am not sure if this is the patient not understanding or just a home health nurse that is trying to clarify.

## 2021-06-08 ENCOUNTER — HOME CARE VISIT (OUTPATIENT)
Dept: HOME HEALTH SERVICES | Facility: CLINIC | Age: 66
End: 2021-06-08

## 2021-06-08 VITALS
HEART RATE: 72 BPM | DIASTOLIC BLOOD PRESSURE: 58 MMHG | SYSTOLIC BLOOD PRESSURE: 100 MMHG | OXYGEN SATURATION: 94 % | TEMPERATURE: 97.1 F | RESPIRATION RATE: 18 BRPM

## 2021-06-08 PROCEDURE — G0151 HHCP-SERV OF PT,EA 15 MIN: HCPCS

## 2021-06-08 NOTE — TELEPHONE ENCOUNTER
We were only going to change his lantus to morning with recurrent night time hypoglycemia. His blood sugars were controlled without this. No change at this time.

## 2021-06-10 ENCOUNTER — HOME CARE VISIT (OUTPATIENT)
Dept: HOME HEALTH SERVICES | Facility: CLINIC | Age: 66
End: 2021-06-10

## 2021-06-10 VITALS
OXYGEN SATURATION: 92 % | TEMPERATURE: 98 F | HEART RATE: 65 BPM | RESPIRATION RATE: 18 BRPM | DIASTOLIC BLOOD PRESSURE: 60 MMHG | SYSTOLIC BLOOD PRESSURE: 102 MMHG

## 2021-06-10 VITALS
TEMPERATURE: 98 F | SYSTOLIC BLOOD PRESSURE: 102 MMHG | HEART RATE: 65 BPM | DIASTOLIC BLOOD PRESSURE: 60 MMHG | OXYGEN SATURATION: 92 % | RESPIRATION RATE: 18 BRPM

## 2021-06-10 PROCEDURE — G0157 HHC PT ASSISTANT EA 15: HCPCS

## 2021-06-10 NOTE — HOME HEALTH
Pt says that he is tired today. Pt reports no falls, med changes, or insurance changes since the previous visit.

## 2021-06-11 ENCOUNTER — HOME CARE VISIT (OUTPATIENT)
Dept: HOME HEALTH SERVICES | Facility: CLINIC | Age: 66
End: 2021-06-11

## 2021-06-11 ENCOUNTER — READMISSION MANAGEMENT (OUTPATIENT)
Dept: CALL CENTER | Facility: HOSPITAL | Age: 66
End: 2021-06-11

## 2021-06-11 NOTE — OUTREACH NOTE
Medical Week 2 Survey      Responses   [facility] [place] patient discharged from?  [place]   Does the patient have one of the following disease processes/diagnoses(primary or secondary)?  Other   Week 2 attempt successful?  No   Unsuccessful attempts  Attempt 1          Laney Ellsworth RN

## 2021-06-14 ENCOUNTER — HOME CARE VISIT (OUTPATIENT)
Dept: HOME HEALTH SERVICES | Facility: CLINIC | Age: 66
End: 2021-06-14

## 2021-06-14 VITALS
SYSTOLIC BLOOD PRESSURE: 98 MMHG | TEMPERATURE: 98 F | RESPIRATION RATE: 16 BRPM | HEART RATE: 76 BPM | DIASTOLIC BLOOD PRESSURE: 60 MMHG | OXYGEN SATURATION: 98 %

## 2021-06-14 VITALS
DIASTOLIC BLOOD PRESSURE: 60 MMHG | RESPIRATION RATE: 16 BRPM | HEART RATE: 67 BPM | SYSTOLIC BLOOD PRESSURE: 112 MMHG | TEMPERATURE: 97.9 F | OXYGEN SATURATION: 98 %

## 2021-06-14 PROCEDURE — G0157 HHC PT ASSISTANT EA 15: HCPCS

## 2021-06-14 PROCEDURE — G0495 RN CARE TRAIN/EDU IN HH: HCPCS

## 2021-06-14 PROCEDURE — G0180 MD CERTIFICATION HHA PATIENT: HCPCS | Performed by: NURSE PRACTITIONER

## 2021-06-14 NOTE — HOME HEALTH
NO FALLS. NO INSURANCE CHANGES. PT FINISHED HIS LEVAQUIN. NO CHANGES IN CONDITION. NO NEED TO CONTACT THE MD.

## 2021-06-16 ENCOUNTER — HOME CARE VISIT (OUTPATIENT)
Dept: HOME HEALTH SERVICES | Facility: CLINIC | Age: 66
End: 2021-06-16

## 2021-06-16 VITALS
TEMPERATURE: 98.2 F | HEART RATE: 71 BPM | OXYGEN SATURATION: 97 % | DIASTOLIC BLOOD PRESSURE: 70 MMHG | RESPIRATION RATE: 16 BRPM | SYSTOLIC BLOOD PRESSURE: 122 MMHG

## 2021-06-16 PROCEDURE — G0157 HHC PT ASSISTANT EA 15: HCPCS

## 2021-06-16 NOTE — HOME HEALTH
Pt has no new c/o today. Pt reports no falls, med changes, or insurance changes since the previous visit.

## 2021-06-17 ENCOUNTER — READMISSION MANAGEMENT (OUTPATIENT)
Dept: CALL CENTER | Facility: HOSPITAL | Age: 66
End: 2021-06-17

## 2021-06-17 NOTE — OUTREACH NOTE
Medical Week 2 Survey      Responses   [facility] patient discharged from?  [place]   Does the patient have one of the following disease processes/diagnoses(primary or secondary)?  Other   Week 2 attempt successful?  Yes   Call start time  1413   General alerts for this patient  lives at  [facility]   Discharge diagnosis  DKA, UTI   Call end time  1422   Is patient permission given to speak with other caregiver?  Yes   List who call center can speak with  Elia Colbert, brother   Person spoke with today (if not patient) and relationship  brother, Elia Mcdonalds reviewed with patient/caregiver?  Yes   Does the patient have all medications ordered at discharge?  Yes   Is the patient taking all medications as directed (includes completed medication regime)?  Yes   Medication comments  Brother reports that HH has gone thru medications and orders and made sure everything is good there.    Comments regarding appointments  Patient has seen urology since discharge and catheter removed.    Does the patient have a primary care provider?   Yes   Comments regarding PCP  Kesha HUITRON,  patient has seen since discharge.    Has the patient kept scheduled appointments due by today?  Yes   Comments  ENDOCRINOLOGY Thursday Jul 22, 2021 3:00 PM   What is the Home health agency?   [facility]   Has home health visited the patient within 72 hours of discharge?  Yes   Psychosocial issues?  No   Psychosocial comments  Patient at assisted living facility.    Comments  Brother reports patient blood sugars are still elevated, but better than what he used to run. Reports more stable with eating 3 meals a day.    Did the patient receive a copy of their discharge instructions?  Yes   Nursing interventions  Reviewed instructions with patient [brother]   What is the patient's perception of their health status since discharge?  Improving   Is the patient/caregiver able to teach back signs and symptoms related to disease process for when to  call PCP?  Yes   Is the patient/caregiver able to teach back signs and symptoms related to disease process for when to call 911?  Yes   Is the patient/caregiver able to teach back the hierarchy of who to call/visit for symptoms/problems? PCP, Specialist, Home health nurse, Urgent Care, ED, 911  Yes   Week 2 Call Completed?  Yes   Is the patient interested in additional calls from an ambulatory ?  NOTE:  applies to high risk patients requiring additional follow-up.  No   Revoked  No further contact(revokes)-requires comment   Graduated/Revoked comments  Patient in assisted living facility and followed by . No further calls.           Lyudmila Vicente RN

## 2021-06-21 ENCOUNTER — HOME CARE VISIT (OUTPATIENT)
Dept: HOME HEALTH SERVICES | Facility: CLINIC | Age: 66
End: 2021-06-21

## 2021-06-21 VITALS
SYSTOLIC BLOOD PRESSURE: 112 MMHG | HEART RATE: 78 BPM | RESPIRATION RATE: 16 BRPM | OXYGEN SATURATION: 98 % | TEMPERATURE: 98.4 F | DIASTOLIC BLOOD PRESSURE: 66 MMHG

## 2021-06-21 VITALS
HEART RATE: 71 BPM | OXYGEN SATURATION: 3 % | DIASTOLIC BLOOD PRESSURE: 58 MMHG | SYSTOLIC BLOOD PRESSURE: 114 MMHG | RESPIRATION RATE: 20 BRPM | TEMPERATURE: 98.1 F

## 2021-06-21 PROCEDURE — G0495 RN CARE TRAIN/EDU IN HH: HCPCS

## 2021-06-21 PROCEDURE — G0157 HHC PT ASSISTANT EA 15: HCPCS

## 2021-06-21 NOTE — HOME HEALTH
No recent falls, no insurance changes, and no recent medication changes. There was no need to contact the MD. Pt/cg had no further questions or concerns regarding the pt's medications or plan of care. A&O X 4. No c/o pain. VSS. Clear lungs, no SOA, regular heart sounds, GI/ WNL, +1 edema in BLE, and no skin issues. BS WNL.

## 2021-06-23 ENCOUNTER — HOME CARE VISIT (OUTPATIENT)
Dept: HOME HEALTH SERVICES | Facility: CLINIC | Age: 66
End: 2021-06-23

## 2021-06-23 NOTE — HOME HEALTH
PTA arrived for scheduled discharge visit. Pt asked that if this was his last visit, then why wasn't he just discharged at the previous one. Pt agreed to just go ahead and complete the d/c paperwork without performing any activity today.

## 2021-06-28 ENCOUNTER — HOME CARE VISIT (OUTPATIENT)
Dept: HOME HEALTH SERVICES | Facility: CLINIC | Age: 66
End: 2021-06-28

## 2021-06-29 ENCOUNTER — HOME CARE VISIT (OUTPATIENT)
Dept: HOME HEALTH SERVICES | Facility: HOME HEALTHCARE | Age: 66
End: 2021-06-29

## 2021-07-16 NOTE — TELEPHONE ENCOUNTER
Caller: MIKEY    Relationship: [facility]    Best call back number: [phone]    What medication are you requesting:     What are your current symptoms:     How long have you been experiencing symptoms:     Have you had these symptoms before:    [] Yes  [] No    Have you been treated for these symptoms before:   [] Yes  [] No    If a prescription is needed, what is your preferred pharmacy and phone number:      Additional notes:   BD CORAL ULTRA FINE PEN NEEDLES UNIVERSAL FIT, 4 MM QUANTITY 100      [place], [place] - [address] [place] [address] [address] - [phone]  - [phone]

## 2021-07-21 ENCOUNTER — TELEPHONE (OUTPATIENT)
Dept: INTERNAL MEDICINE | Facility: CLINIC | Age: 66
End: 2021-07-21

## 2021-07-21 NOTE — TELEPHONE ENCOUNTER
Pharmacy Name:  [facility]    Pharmacy representative name: RITA     Pharmacy representative phone number: [phone]    What question does the pharmacy have: NEEDING TO TALK TO SOMEONE ABOUT MEDS    Who is the provider that prescribed the medication: JOSIAH

## 2021-07-22 ENCOUNTER — OFFICE VISIT (OUTPATIENT)
Dept: ENDOCRINOLOGY | Facility: CLINIC | Age: 66
End: 2021-07-22

## 2021-07-22 VITALS
WEIGHT: 165 LBS | HEIGHT: 72 IN | OXYGEN SATURATION: 96 % | SYSTOLIC BLOOD PRESSURE: 94 MMHG | HEART RATE: 78 BPM | DIASTOLIC BLOOD PRESSURE: 52 MMHG | BODY MASS INDEX: 22.35 KG/M2

## 2021-07-22 DIAGNOSIS — M85.80 OSTEOPENIA, UNSPECIFIED LOCATION: ICD-10-CM

## 2021-07-22 DIAGNOSIS — E03.8 HYPOTHYROIDISM DUE TO HASHIMOTO'S THYROIDITIS: Primary | ICD-10-CM

## 2021-07-22 DIAGNOSIS — M85.89 OTHER SPECIFIED DISORDERS OF BONE DENSITY AND STRUCTURE, MULTIPLE SITES: ICD-10-CM

## 2021-07-22 DIAGNOSIS — E10.65 TYPE 1 DIABETES MELLITUS WITH HYPERGLYCEMIA (HCC): ICD-10-CM

## 2021-07-22 DIAGNOSIS — E55.9 VITAMIN D DEFICIENCY, UNSPECIFIED: ICD-10-CM

## 2021-07-22 DIAGNOSIS — T14.8XXA FRACTURE: ICD-10-CM

## 2021-07-22 DIAGNOSIS — E06.3 HYPOTHYROIDISM DUE TO HASHIMOTO'S THYROIDITIS: Primary | ICD-10-CM

## 2021-07-22 PROCEDURE — 95251 CONT GLUC MNTR ANALYSIS I&R: CPT | Performed by: NURSE PRACTITIONER

## 2021-07-22 PROCEDURE — 99214 OFFICE O/P EST MOD 30 MIN: CPT | Performed by: NURSE PRACTITIONER

## 2021-07-22 NOTE — PROGRESS NOTES
"Chief Complaint  Diabetes (type 1) and Hypothyroidism    Subjective          Hi Colbert presents to [facility] ENDOCRINOLOGY  History of Present Illness             Chief Complaint   Patient presents with   • Diabetes     type 1   • Hypothyroidism       HPI    In office visit       Referring provider ELANA Copeland         Reason -- diabetes mellitus Type 1     Duration--- diagnosed at age 22     Context --- presented for increased urination, thirst and blood sugara was over 700     Timing --constant     Quality  Not controlled     Severity high     Lab Results   Component Value Date    HGBA1C 7.90 (H) 05/29/2021         Macrovascular complications---TIA     Microvascular complications ---neuropathy, no DR     Left great toe amputation       Renal stones --- on Flomax       Current diabetes regimen ---insulin       Current glucose monitoring     Dexcom G6     Downloaded and reviewed     Dated from July 9 to July 22, 2021     Average     Time in target 51%     High 41%     Very high 7 %     Low less than 1 %     Very low 0 %             Current diet-- regular         Review of Systems - General ROS: positive for  - fatigue  Musculoskeletal ROS: positive for - muscular weakness        Objective   Vital Signs:   BP 94/52   Pulse 78   Ht 182.9 cm (72\")   Wt 74.8 kg (165 lb)   SpO2 96%   BMI 22.38 kg/m²     Physical Exam  Constitutional:       Appearance: Normal appearance.   Cardiovascular:      Rate and Rhythm: Regular rhythm.      Heart sounds: Normal heart sounds.   Pulmonary:      Breath sounds: Normal breath sounds.   Musculoskeletal:         General: Normal range of motion.      Cervical back: Normal range of motion.   Neurological:      Mental Status: He is alert.        Result Review :   The following data was reviewed by: ELANA Jasso on 07/22/2021:  Common labs    Common Labsle 5/31/21 5/31/21 6/1/21 6/1/21 6/2/21 6/2/21    0643 0643 0551 0551 0447 0447 "   Glucose  162 (A)  113 (A)  77   BUN  17  11  9   Creatinine  0.84  0.76  0.85   eGFR Non  Am  91  103  90   Sodium  133 (A)  139  138   Potassium  3.7  3.3 (A)  3.4 (A)   Chloride  102  106  102   Calcium  8.7  8.6  8.8   Albumin  2.70 (A)  2.80 (A)  2.90 (A)   Total Bilirubin  0.3  0.3  0.2   Alkaline Phosphatase  79  110  66   AST (SGOT)  16  30  17   ALT (SGPT)  11  12  10   WBC 15.41 (A)  10.81 (A)  8.96    Hemoglobin 10.5 (A)  10.5 (A)  11.1 (A)    Hematocrit 31.0 (A)  30.7 (A)  32.4 (A)    Platelets 135 (A)  147  173    (A) Abnormal value                      Assessment and Plan    Diagnoses and all orders for this visit:    1. Hypothyroidism due to Hashimoto's thyroiditis (Primary)  -     CBC & Differential; Future  -     Comprehensive Metabolic Panel; Future  -     Hemoglobin A1c; Future  -     Magnesium; Future  -     TSH; Future  -     Vitamin D 25 Hydroxy; Future  -     Vitamin B12; Future    2. Type 1 diabetes mellitus with hyperglycemia (CMS/[facility])  -     CBC & Differential; Future  -     Comprehensive Metabolic Panel; Future  -     Hemoglobin A1c; Future  -     Magnesium; Future  -     TSH; Future  -     Vitamin D 25 Hydroxy; Future  -     Vitamin B12; Future    3. Vitamin D deficiency, unspecified   -     Vitamin D 25 Hydroxy; Future    4. Fracture  -     dexa bone density axial; Future    5. Osteopenia, unspecified location  -     dexa bone density axial; Future    6. Other specified disorders of bone density and structure, multiple sites   -     dexa bone density axial; Future                   Glycemic Management:      Type diabetes mellitus type 1     Lab Results   Component Value Date    HGBA1C 7.90 (H) 05/29/2021         Dexcom G6     Downloaded and reviewed     Dated from July 9 to July 22, 2021     Average     Time in target 51%     High 41%     Very high 7 %     Low less than 1 %     Very low 0 %         At goal overnight    Postprandial hyperglycemia with supper     Adjust  supper     Taking lantus 9 units in the evening -keep        Taking Novolog 4 units TID before each meal     Take 5 units before super    Plus sliding scale 2 per 50 above 150                       Microvascular Complications Monitoring       Last eye exam---- June 2021, no DR           Neuropathy --yes             Lipid Management:     Not on statin         Blood Pressure Management:      Thyroid Health      Lab Results   Component Value Date    TSH 5.960 (H) 05/30/2021       Taking levothyroxine 150 mcg daily          Bone Health       Weight Management:    Patient's Body mass index is 22.38 kg/m². indicating that he is within normal range (BMI 18.5-24.9). No BMI management plan needed..        Preventive Care:       Smoker    Hi Colbert  reports that he has been smoking cigarettes. He has never used smokeless tobacco.. I have educated him on the risk of diseases from using tobacco products such as cancer, COPD and heart disease.     I advised him to quit and he is not willing to quit.    I spent 3  minutes counseling the patient.                                           This document has been electronically signed by ELANA Jasso on July 22, 2021 17:30 CDT        Follow Up   Return in about 3 months (around 10/22/2021) for Recheck.  Patient was given instructions and counseling regarding his condition or for health maintenance advice. Please see specific information pulled into the AVS if appropriate.

## 2021-07-23 DIAGNOSIS — E03.9 ACQUIRED HYPOTHYROIDISM: ICD-10-CM

## 2021-07-23 DIAGNOSIS — E10.3393 TYPE 1 DIABETES MELLITUS WITH MODERATE NONPROLIFERATIVE RETINOPATHY OF BOTH EYES WITHOUT MACULAR EDEMA (HCC): ICD-10-CM

## 2021-07-23 DIAGNOSIS — R35.0 URINE FREQUENCY: ICD-10-CM

## 2021-07-23 RX ORDER — INSULIN ASPART 100 [IU]/ML
INJECTION, SOLUTION INTRAVENOUS; SUBCUTANEOUS
Qty: 4 PEN | Refills: 11 | Status: SHIPPED | OUTPATIENT
Start: 2021-07-23 | End: 2022-03-22 | Stop reason: SDUPTHER

## 2021-07-23 RX ORDER — INSULIN GLARGINE 100 [IU]/ML
9 INJECTION, SOLUTION SUBCUTANEOUS NIGHTLY
Qty: 3 PEN | Refills: 3 | Status: SHIPPED | OUTPATIENT
Start: 2021-07-23 | End: 2022-03-22 | Stop reason: SDUPTHER

## 2021-07-23 RX ORDER — LEVOTHYROXINE SODIUM 0.15 MG/1
150 TABLET ORAL DAILY
Qty: 90 TABLET | Refills: 3 | Status: SHIPPED | OUTPATIENT
Start: 2021-07-23 | End: 2022-03-16 | Stop reason: SDUPTHER

## 2021-07-23 RX ORDER — TAMSULOSIN HYDROCHLORIDE 0.4 MG/1
1 CAPSULE ORAL NIGHTLY
Qty: 90 CAPSULE | Refills: 3 | Status: SHIPPED | OUTPATIENT
Start: 2021-07-23 | End: 2022-08-02 | Stop reason: SDUPTHER

## 2021-07-23 RX ORDER — ERGOCALCIFEROL 1.25 MG/1
50000 CAPSULE ORAL
Qty: 12 CAPSULE | Refills: 3 | Status: SHIPPED | OUTPATIENT
Start: 2021-07-23 | End: 2022-03-22 | Stop reason: SDUPTHER

## 2021-07-23 RX ORDER — ACETAMINOPHEN 325 MG/1
650 TABLET ORAL EVERY 4 HOURS PRN
Qty: 500 TABLET | Refills: 3 | Status: SHIPPED | OUTPATIENT
Start: 2021-07-23 | End: 2021-09-15

## 2021-07-23 RX ORDER — POTASSIUM CHLORIDE 20 MEQ/1
20 TABLET, EXTENDED RELEASE ORAL DAILY
Qty: 90 TABLET | Refills: 3 | Status: SHIPPED | OUTPATIENT
Start: 2021-07-23 | End: 2022-07-25

## 2021-07-23 RX ORDER — DOCUSATE SODIUM 100 MG/1
100 CAPSULE, LIQUID FILLED ORAL 2 TIMES DAILY
Qty: 180 CAPSULE | Refills: 3 | Status: SHIPPED | OUTPATIENT
Start: 2021-07-23 | End: 2021-09-14

## 2021-07-23 RX ORDER — IBUPROFEN 200 MG
600 TABLET ORAL EVERY 6 HOURS PRN
Qty: 500 TABLET | Refills: 3 | Status: SHIPPED | OUTPATIENT
Start: 2021-07-23

## 2021-07-23 RX ORDER — PROCHLORPERAZINE 25 MG/1
SUPPOSITORY RECTAL
Qty: 9 EACH | Refills: 3 | Status: SHIPPED | OUTPATIENT
Start: 2021-07-23

## 2021-08-04 ENCOUNTER — PROCEDURE VISIT (OUTPATIENT)
Dept: UROLOGY | Facility: CLINIC | Age: 66
End: 2021-08-04

## 2021-08-04 ENCOUNTER — HOSPITAL ENCOUNTER (OUTPATIENT)
Dept: ULTRASOUND IMAGING | Facility: HOSPITAL | Age: 66
Discharge: HOME | End: 2021-08-04

## 2021-08-04 ENCOUNTER — HOSPITAL ENCOUNTER (OUTPATIENT)
Dept: BONE DENSITY | Facility: HOSPITAL | Age: 66
Discharge: HOME | End: 2021-08-04

## 2021-08-04 DIAGNOSIS — N20.0 KIDNEY STONE: ICD-10-CM

## 2021-08-04 DIAGNOSIS — N20.0 KIDNEY STONE: Primary | ICD-10-CM

## 2021-08-04 DIAGNOSIS — M85.80 OSTEOPENIA, UNSPECIFIED LOCATION: ICD-10-CM

## 2021-08-04 DIAGNOSIS — M85.89 OTHER SPECIFIED DISORDERS OF BONE DENSITY AND STRUCTURE, MULTIPLE SITES: ICD-10-CM

## 2021-08-04 DIAGNOSIS — R35.0 URINE FREQUENCY: ICD-10-CM

## 2021-08-04 DIAGNOSIS — T14.8XXA FRACTURE: ICD-10-CM

## 2021-08-04 DIAGNOSIS — R31.0 GROSS HEMATURIA: ICD-10-CM

## 2021-08-04 LAB
BILIRUB BLD-MCNC: NEGATIVE MG/DL
CLARITY, POC: CLEAR
COLOR UR: YELLOW
GLUCOSE UR STRIP-MCNC: ABNORMAL MG/DL
KETONES UR QL: ABNORMAL
LEUKOCYTE EST, POC: NEGATIVE
NITRITE UR-MCNC: POSITIVE MG/ML
PH UR: 5.5 [PH] (ref 5–8)
PROT UR STRIP-MCNC: NEGATIVE MG/DL
RBC # UR STRIP: ABNORMAL /UL
SP GR UR: 1.02 (ref 1–1.03)
UROBILINOGEN UR QL: NORMAL

## 2021-08-04 PROCEDURE — 77080 DXA BONE DENSITY AXIAL: CPT

## 2021-08-04 PROCEDURE — 52000 CYSTOURETHROSCOPY: CPT | Performed by: UROLOGY

## 2021-08-04 PROCEDURE — 76775 US EXAM ABDO BACK WALL LIM: CPT

## 2021-08-04 PROCEDURE — 81001 URINALYSIS AUTO W/SCOPE: CPT | Performed by: UROLOGY

## 2021-08-05 ENCOUNTER — TELEPHONE (OUTPATIENT)
Dept: INTERNAL MEDICINE | Facility: CLINIC | Age: 66
End: 2021-08-05

## 2021-08-05 NOTE — PROGRESS NOTES
Pre- operative diagnosis:  History of urinary retention.  History of hematuria.     Post operative diagnosis:  Minimal prostate enlargement.  Incomplete bladder emptying likely due to a combination of diabetes and limited mobility    Procedure:  The patient was prepped and draped in a normal sterile fashion.  The urethra was anesthetized with 2% lidocaine jelly.  A flexible cystoscope was introduced per urethra.      Urethra:  Normal    Bladder:  Normal mucosa, No bladder tumor and No bladder neck contracture.  No bladder stone    Ureteral orifices:  Normal position bilaterally and Clear efflux bilaterally    Prostate:  Minimal enlargement not visually obstructing    Patient tolerated the procedure well    Complications: none    Blood loss: minimal    Follow up:    Routine follow up in 6 weeks for repeat PVR check.  Continue tamsulosin.  I recommended timed voiding every 2 hours.  We discussed that he is likely not emptying his bladder completely due to a combination of diabetes and limited mobility.  We discussed possible myogenic failure.  We discussed options for bladder management including continuing conservative management, indwelling Sanchez catheter, intermittent catheterization, and suprapubic tube.  He wants to continue spontaneous voiding for now.  He will need future follow-up for his small nonobstructing kidney stones.

## 2021-08-05 NOTE — PROGRESS NOTES
Spoke with pt's brother, caretaker. He asked if we could send to PCP first to see if they could write it and have it done closer to them in [place]. He stated if not, they would drive here - 2 hr drive -per pt's brother.     Does this have to be approved through insurance/Jolene? Do I need to call PCP? Please advise.

## 2021-08-05 NOTE — PROGRESS NOTES
Spoke with pt's brother, Elia, informed to call PCP to ask if they can order Prolia. I tried to call PCP but no answer and no VM. They will try to call and ask. If they can order we will fax lab work.

## 2021-08-09 ENCOUNTER — TELEPHONE (OUTPATIENT)
Dept: ENDOCRINOLOGY | Facility: CLINIC | Age: 66
End: 2021-08-09

## 2021-08-09 NOTE — TELEPHONE ENCOUNTER
Pt needs to know if he can get prolia shot closer to home . Pt needs to know if he can done by PCP at home   Pt lives in [facility] and let them know where this can be done in [place] [phone]

## 2021-08-13 ENCOUNTER — DOCUMENTATION (OUTPATIENT)
Dept: ENDOCRINOLOGY | Facility: CLINIC | Age: 66
End: 2021-08-13

## 2021-08-13 ENCOUNTER — TELEPHONE (OUTPATIENT)
Dept: ENDOCRINOLOGY | Facility: CLINIC | Age: 66
End: 2021-08-13

## 2021-08-13 DIAGNOSIS — M85.89 OSTEOPENIA OF MULTIPLE SITES: Primary | ICD-10-CM

## 2021-08-13 RX ORDER — DENOSUMAB 60 MG/ML
60 INJECTION SUBCUTANEOUS ONCE
Qty: 180 ML | Refills: 1 | Status: SHIPPED | OUTPATIENT
Start: 2021-08-13 | End: 2021-09-29

## 2021-08-13 NOTE — TELEPHONE ENCOUNTER
Kamilah at [facility] office left a vm stating they need clarification on the prolia issue [phone] option 7

## 2021-08-13 NOTE — TELEPHONE ENCOUNTER
SPOKE TO SRIDHAR AT [facility] OFFICE. THEY ARE ABLE TO GIVE. WE NEED TO ORDER PROLIA TO \[facility] ([facility]). PT CAN GET LABS AT LAB OFFICE CLOSE TO PHARMACY THEN GO TO PCP OFFICE FOR INJECTION. WILL FORWARD TO PROVIDER FOR SCRIPT.

## 2021-08-13 NOTE — PROGRESS NOTES
FAXED DEXA SCAN AND NOTES TO ELANA LANDRY. PROLIA ORDER NEEDED - PT WANTS TO STAY IN [place]. MESSAGE SENT.

## 2021-08-13 NOTE — TELEPHONE ENCOUNTER
Returned phone call - LM with  to call us back regarding the Prolia questions.    Tee stated FRAX score is done on every diabetic patient. His HIP FRAX score is 4.6 , therefore needs Prolia injection.

## 2021-08-27 ENCOUNTER — TELEPHONE (OUTPATIENT)
Dept: INTERNAL MEDICINE | Facility: CLINIC | Age: 66
End: 2021-08-27

## 2021-08-27 NOTE — TELEPHONE ENCOUNTER
"Xavi with [facility] regarding patient's blood sugars. He saying that his normal blood sugar is 100-120. His \"low\" is around 80.  He usually does 4 units in the morning and 5 units at bedtime. He is wanting to know if he can do 2-3 units in the morning instead?    [phone]  "

## 2021-08-27 NOTE — TELEPHONE ENCOUNTER
Neena at [facility] was informed. She will inform the patient as he does the insulin for himself.

## 2021-08-27 NOTE — TELEPHONE ENCOUNTER
"He can do 3 units, but with elevated readings, would want him to go back to 4 units. 80 is not really low and just well controlled. However, if he is feeling \"bad\" can try 3 units. "

## 2021-09-14 ENCOUNTER — OFFICE VISIT (OUTPATIENT)
Dept: INTERNAL MEDICINE | Facility: CLINIC | Age: 66
End: 2021-09-14

## 2021-09-14 VITALS
TEMPERATURE: 97.8 F | HEIGHT: 72 IN | OXYGEN SATURATION: 98 % | DIASTOLIC BLOOD PRESSURE: 60 MMHG | BODY MASS INDEX: 22.38 KG/M2 | SYSTOLIC BLOOD PRESSURE: 110 MMHG | HEART RATE: 76 BPM

## 2021-09-14 DIAGNOSIS — E10.3393 TYPE 1 DIABETES MELLITUS WITH MODERATE NONPROLIFERATIVE RETINOPATHY OF BOTH EYES WITHOUT MACULAR EDEMA (HCC): Primary | ICD-10-CM

## 2021-09-14 DIAGNOSIS — E03.8 OTHER SPECIFIED HYPOTHYROIDISM: ICD-10-CM

## 2021-09-14 DIAGNOSIS — G99.0 PERIPHERAL AUTONOMIC NEUROPATHY IN DISORDERS CLASSIFIED ELSEWHERE: ICD-10-CM

## 2021-09-14 DIAGNOSIS — F17.210 CIGARETTE SMOKER: ICD-10-CM

## 2021-09-14 DIAGNOSIS — Z86.73 HISTORY OF STROKE: ICD-10-CM

## 2021-09-14 DIAGNOSIS — M62.81 MUSCLE WEAKNESS (GENERALIZED): ICD-10-CM

## 2021-09-14 PROCEDURE — 99213 OFFICE O/P EST LOW 20 MIN: CPT | Performed by: NURSE PRACTITIONER

## 2021-09-14 NOTE — PROGRESS NOTES
Subjective    Mr. Colbert is 65 y.o. male    Chief Complaint: 6 week follow up for Urinary Frequency    History of Present Illness  Patient with previous history of ureteral stone that was most likely passed as well as incomplete bladder emptying likely due to a combination of diabetes and limited mobility.  Patient had a cystoscopy done 08/04/2021 by Dr. Marie.  Cystoscopy findings urethra was normal bladder showed normal mucosa no bladder tumor no bladder neck contracture no bladder stone.  Minimal enlargement not visually obstructing of the prostate.  Patient was recommended to do time prompted voiding every 2 hours he is voiding spontaneously he has no suprapubic discomfort he is not had a catheter placed he is on tamsulosin.    The following portions of the patient's history were reviewed and updated as appropriate: allergies, current medications, past family history, past medical history, past social history, past surgical history and problem list.    Review of Systems      Current Outpatient Medications:   •  Cholecalciferol 1.25 MG (30942 UT) tablet, Take 1.25 mg by mouth 1 (One) Time Per Week. ADMINISTER EVERY WEDNESDAY, Disp: , Rfl:   •  Continuous Blood Gluc  (Dexcom G6 ) device, 1 each Take As Directed., Disp: 1 each, Rfl: 0  •  Continuous Blood Gluc Sensor (Dexcom G6 Sensor), Every 10 (Ten) Days., Disp: 9 each, Rfl: 3  •  Continuous Blood Gluc Transmit (Dexcom G6 Transmitter) misc, 1 each Take As Directed., Disp: 1 each, Rfl: 0  •  Glucagon, rDNA, (Glucagon Emergency) 1 MG kit, Inject 1 mg as directed As Needed (AS NEEDED FOR LOW SUGAR)., Disp: , Rfl:   •  ibuprofen (ADVIL,MOTRIN) 200 MG tablet, Take 3 tablets by mouth Every 6 (Six) Hours As Needed for Moderate Pain ., Disp: 500 tablet, Rfl: 3  •  insulin aspart (NovoLOG FlexPen) 100 UNIT/ML solution pen-injector sc pen, With meals sliding scale:151-200-2 UNITS 201-250 -4 UNITS 251-300-6 UNITS 301-350-8 UNITS 351-400-10 UNITS 401 +-12  UNITS, Disp: 4 pen, Rfl: 11  •  Insulin Glargine (Lantus SoloStar) 100 UNIT/ML injection pen, Inject 9 Units under the skin into the appropriate area as directed Every Night., Disp: 3 pen, Rfl: 3  •  Insulin Pen Needle 31G X 5 MM misc, 1 Device 4 (Four) Times a Day Before Meals & at Bedtime., Disp: 100 each, Rfl: 3  •  levothyroxine (SYNTHROID, LEVOTHROID) 150 MCG tablet, Take 1 tablet by mouth Daily., Disp: 90 tablet, Rfl: 3  •  potassium chloride (KLOR-CON) 20 MEQ CR tablet, Take 1 tablet by mouth Daily., Disp: 90 tablet, Rfl: 3  •  tamsulosin (FLOMAX) 0.4 MG capsule 24 hr capsule, Take 1 capsule by mouth Every Night., Disp: 90 capsule, Rfl: 3  •  vitamin D (ERGOCALCIFEROL) 1.25 MG (61600 UT) capsule capsule, Take 1 capsule by mouth Every 7 (Seven) Days., Disp: 12 capsule, Rfl: 3  •  denosumab (PROLIA) 60 MG/ML solution prefilled syringe syringe, Inject 1 mL under the skin into the appropriate area as directed 1 (One) Time for 1 dose., Disp: 1 mL, Rfl: 0  •  denosumab (Prolia) 60 MG/ML solution prefilled syringe syringe, Inject 1 mL under the skin into the appropriate area as directed 1 (One) Time for 1 dose., Disp: 180 mL, Rfl: 1    Past Medical History:   Diagnosis Date   • Cataract    • Diabetes mellitus (CMS/HCC)    • Falls    • Gait abnormality    • GERD (gastroesophageal reflux disease)    • Hypothyroidism    • Muscle spasm    • Nicotine dependence    • Rhabdomyolysis    • Tremor        Past Surgical History:   Procedure Laterality Date   • OTHER SURGICAL HISTORY      cyst on tailbone, left big toe       Social History     Socioeconomic History   • Marital status: Single     Spouse name: Not on file   • Number of children: Not on file   • Years of education: Not on file   • Highest education level: Not on file   Tobacco Use   • Smoking status: Current Every Day Smoker     Types: Cigarettes   • Smokeless tobacco: Never Used   • Tobacco comment: 16-17 cigs a day per pt as of 07/22/2021   Substance and Sexual  "Activity   • Alcohol use: Defer   • Drug use: Defer   • Sexual activity: Defer       Family History   Problem Relation Age of Onset   • No Known Problems Mother    • Alzheimer's disease Father        Objective    Temp 98.7 °F (37.1 °C) (Temporal)   Resp 18   Ht 182.9 cm (72\")   BMI 22.38 kg/m²     Physical Exam  Vitals reviewed.   Constitutional:       Appearance: Normal appearance.   HENT:      Head: Normocephalic and atraumatic.      Right Ear: External ear normal.      Left Ear: External ear normal.   Pulmonary:      Effort: Pulmonary effort is normal.   Abdominal:      General: There is no distension.      Tenderness: There is no abdominal tenderness.   Musculoskeletal:      Comments: Patient is able to bear weight he is able to move from the exam table to the wheelchair.   Skin:     General: Skin is warm and dry.   Neurological:      General: No focal deficit present.      Mental Status: He is alert and oriented to person, place, and time.   Psychiatric:         Mood and Affect: Mood normal.         Behavior: Behavior normal.         Bladder Scan interpretation  Estimation of residual urine via abdominal ultrasound  Residual Urine: 236 ml  Indication: Urinary Frequency  Position: Supine  Examination: Incremental scanning of the suprapubic area using 3 MHz transducer using copious amounts of acoustic gel.   Findings: An anechoic area was demonstrated which represented the bladder, with measurement of residual urine as noted. I inspected this myself. In that the residual urine was stable or insignificant, no treatment will be necessary at this time.         Results for orders placed or performed in visit on 08/04/21   POC Urinalysis Dipstick, Multipro    Specimen: Urine   Result Value Ref Range    Color Yellow Yellow, Straw, Dark Yellow, Sneha    Clarity, UA Clear Clear    Glucose, UA 1+ (A) Negative, 1000 mg/dL (3+) mg/dL    Bilirubin Negative Negative    Ketones, UA Trace (A) Negative    Specific Gravity  " 1.020 1.005 - 1.030    Blood, UA 1+ (A) Negative    pH, Urine 5.5 5.0 - 8.0    Protein, POC Negative Negative mg/dL    Urobilinogen, UA Normal Normal    Nitrite, UA Positive (A) Negative    Leukocytes Negative Negative     Assessment and Plan    Diagnoses and all orders for this visit:    1. Urine frequency (Primary)  -     POC Urinalysis Dipstick, Multipro  -     XR Abdomen KUB; Future    2. Kidney stones  -     XR Abdomen KUB; Future    3. Incomplete bladder emptying  -     XR Abdomen KUB; Future    Patient who is voiding spontaneously on his own he does have elevated residual which she had previously it is 236 mL today.  Recommend continue the tamsulosin at this point do not feel he needs indwelling catheter or intermittent cath and for now patient would rather void spontaneously on his own he will follow-up 3 months with a KUB to monitor for any new to kidney stones.

## 2021-09-14 NOTE — PROGRESS NOTES
Subjective   Hi Colbert is a 65 y.o. male.   Chief Complaint   Patient presents with   • Hypothyroidism       Mr. Colbert present to the office for in office visit after establishing care with endocrinologist. Patient had initial visit in July and has additional follow up in October. Patient reports having less hypoglycemic episodes and 24-hour blood sugar reading from glucose monitor reported highest blood sugar at night of 244 and morning blood sugar lowest 124. Endocrinologist is adjustment medicine with patient at this time. They are also monitoring his thyroid function and will be making adjustment on medicine management as well. Working with practice to complete prolia for osteopenia.     Patient reporting continued smoking. He denies having desire to quit. He does smoke less often because he is required to go outside to smoke and he reports with his generalized weakness, be does not always want to make the trip. Patient reports that he would be interested in starting physical therapy again to help improve his strength, especially on the left sided residual weakness after his stroke. Patient is overall wheelchair bound.        The following portions of the patient's history were reviewed and updated as appropriate: allergies, current medications, past family history, past medical history, past social history, past surgical history and problem list.    Review of Systems   Constitutional: Positive for fatigue. Negative for activity change, appetite change, fever, unexpected weight gain and unexpected weight loss.   HENT: Negative for swollen glands, trouble swallowing and voice change.    Eyes: Negative for blurred vision and visual disturbance.   Respiratory: Negative for cough and shortness of breath.    Cardiovascular: Negative for chest pain, palpitations and leg swelling.   Gastrointestinal: Negative for abdominal pain, constipation, diarrhea, nausea, vomiting and indigestion.   Endocrine: Negative for  cold intolerance, heat intolerance, polydipsia and polyphagia.   Genitourinary: Negative for dysuria and frequency.   Musculoskeletal: Positive for back pain, gait problem and myalgias. Negative for arthralgias, joint swelling and neck pain.   Skin: Negative for color change, rash and skin lesions.   Neurological: Positive for weakness and numbness (chronic neuropathy). Negative for dizziness, headache, memory problem and confusion.   Hematological: Does not bruise/bleed easily.   Psychiatric/Behavioral: Negative for agitation, hallucinations and suicidal ideas. The patient is not nervous/anxious.        Objective   Past Medical History:   Diagnosis Date   • Cataract    • Diabetes mellitus (CMS/HCC)    • Falls    • Gait abnormality    • GERD (gastroesophageal reflux disease)    • Hypothyroidism    • Muscle spasm    • Nicotine dependence    • Rhabdomyolysis    • Tremor       Past Surgical History:   Procedure Laterality Date   • OTHER SURGICAL HISTORY      cyst on tailbone, left big toe        Current Outpatient Medications:   •  acetaminophen (TYLENOL) 325 MG tablet, Take 2 tablets by mouth Every 4 (Four) Hours As Needed for Moderate Pain ., Disp: 500 tablet, Rfl: 3  •  Cholecalciferol 1.25 MG (88568 UT) tablet, Take 1.25 mg by mouth 1 (One) Time Per Week. ADMINISTER EVERY WEDNESDAY, Disp: , Rfl:   •  Continuous Blood Gluc  (Dexcom G6 ) device, 1 each Take As Directed., Disp: 1 each, Rfl: 0  •  Continuous Blood Gluc Sensor (Dexcom G6 Sensor), Every 10 (Ten) Days., Disp: 9 each, Rfl: 3  •  Continuous Blood Gluc Transmit (Dexcom G6 Transmitter) misc, 1 each Take As Directed., Disp: 1 each, Rfl: 0  •  Glucagon, rDNA, (Glucagon Emergency) 1 MG kit, Inject 1 mg as directed As Needed (AS NEEDED FOR LOW SUGAR)., Disp: , Rfl:   •  ibuprofen (ADVIL,MOTRIN) 200 MG tablet, Take 3 tablets by mouth Every 6 (Six) Hours As Needed for Moderate Pain ., Disp: 500 tablet, Rfl: 3  •  insulin aspart (NovoLOG FlexPen) 100  UNIT/ML solution pen-injector sc pen, With meals sliding scale:151-200-2 UNITS 201-250 -4 UNITS 251-300-6 UNITS 301-350-8 UNITS 351-400-10 UNITS 401 +-12 UNITS, Disp: 4 pen, Rfl: 11  •  Insulin Glargine (Lantus SoloStar) 100 UNIT/ML injection pen, Inject 9 Units under the skin into the appropriate area as directed Every Night., Disp: 3 pen, Rfl: 3  •  levothyroxine (SYNTHROID, LEVOTHROID) 150 MCG tablet, Take 1 tablet by mouth Daily., Disp: 90 tablet, Rfl: 3  •  potassium chloride (KLOR-CON) 20 MEQ CR tablet, Take 1 tablet by mouth Daily., Disp: 90 tablet, Rfl: 3  •  tamsulosin (FLOMAX) 0.4 MG capsule 24 hr capsule, Take 1 capsule by mouth Every Night., Disp: 90 capsule, Rfl: 3  •  vitamin D (ERGOCALCIFEROL) 1.25 MG (26469 UT) capsule capsule, Take 1 capsule by mouth Every 7 (Seven) Days., Disp: 12 capsule, Rfl: 3  •  denosumab (PROLIA) 60 MG/ML solution prefilled syringe syringe, Inject 1 mL under the skin into the appropriate area as directed 1 (One) Time for 1 dose., Disp: 1 mL, Rfl: 0  •  denosumab (Prolia) 60 MG/ML solution prefilled syringe syringe, Inject 1 mL under the skin into the appropriate area as directed 1 (One) Time for 1 dose., Disp: 180 mL, Rfl: 1  •  Insulin Pen Needle 31G X 5 MM misc, 1 Device 4 (Four) Times a Day Before Meals & at Bedtime., Disp: 100 each, Rfl: 3      Vitals:    09/14/21 1350   BP: 110/60   Pulse: 76   Temp: 97.8 °F (36.6 °C)   SpO2: 98%     There were no vitals filed for this visit.    Body mass index is 22.38 kg/m².    Physical Exam  Vitals and nursing note reviewed.   Constitutional:       Appearance: Normal appearance. He is well-developed, well-groomed and normal weight.   HENT:      Head: Normocephalic and atraumatic.      Right Ear: Hearing, tympanic membrane, ear canal and external ear normal.      Left Ear: Hearing, tympanic membrane, ear canal and external ear normal.      Nose: Nose normal.      Mouth/Throat:      Lips: Pink.      Mouth: Mucous membranes are moist.       Pharynx: Oropharynx is clear. Uvula midline.   Eyes:      General: Lids are normal. Lids are everted, no foreign bodies appreciated. Vision grossly intact.      Extraocular Movements: Extraocular movements intact.      Conjunctiva/sclera: Conjunctivae normal.      Pupils: Pupils are equal, round, and reactive to light.   Neck:      Thyroid: No thyromegaly.      Vascular: No carotid bruit.      Trachea: Trachea normal.   Cardiovascular:      Rate and Rhythm: Normal rate and regular rhythm.      Heart sounds: Normal heart sounds. No murmur heard.     Pulmonary:      Effort: Pulmonary effort is normal.      Breath sounds: Normal breath sounds.      Comments: Decreased generalized due to smoking  Abdominal:      General: Abdomen is flat. Bowel sounds are normal.      Palpations: Abdomen is soft.      Tenderness: There is no abdominal tenderness.   Musculoskeletal:      Cervical back: Full passive range of motion without pain, normal range of motion and neck supple.      Right lower leg: No edema.      Left lower leg: No edema.   Lymphadenopathy:      Head:      Right side of head: No submental, submandibular, tonsillar, preauricular, posterior auricular or occipital adenopathy.      Left side of head: No submental, submandibular, tonsillar, preauricular, posterior auricular or occipital adenopathy.      Cervical: No cervical adenopathy.   Skin:     General: Skin is warm and dry.      Capillary Refill: Capillary refill takes less than 2 seconds.   Neurological:      Mental Status: He is alert and oriented to person, place, and time.      Sensory: Sensory deficit present.      Comments: 3/5 left sided upper and lower extremity strength chronic due to changes after stroke last year.  5/5 right upper and bilateral lower extremities.     Numbness and tingling on left side of body and bilateral feet (chronic unchanged).     SOFIA gait related to lower extremity perceived weakness and fatigue with standing. Wheelchair-bound  most of the time.   Psychiatric:         Attention and Perception: Attention normal.         Mood and Affect: Mood normal.         Speech: Speech normal.         Behavior: Behavior normal. Behavior is cooperative.         Thought Content: Thought content normal.         Assessment/Plan   Diagnoses and all orders for this visit:    1. 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  Dispense: 100 each; Refill: 3    2. Other specified hypothyroidism    3. Cigarette smoker    4. Peripheral autonomic neuropathy in disorders classified elsewhere  -     Ambulatory Referral to Home Health    5. Muscle weakness (generalized)  -     Ambulatory Referral to Home Health    6. History of stroke  -     Ambulatory Referral to Home Health      Will continue to work with endocrine management of thyroid and diabetes management. Patient will continue to follow up for annual exams and prolia injections. Did not remember to go to the pharmacy to get prolia injection today. Will call [facility] and determine approval and schedule a nurse prolia injection visit with our office.     Patient is requiring a new insulin pump/CGM and supplies. Patient has diabetes type 1; patient is using continuous glucose monitoring and frequent testing for better surveillance of patient frailty. Patient is treated with insulin multiple daily injections. Patient has diabetes that requires frequent monitoring and insulin adjustment accordingly to reduce risk of hypoglycemia/ hyperglycemia. Mr. Colbert requires frequent adjustment on the basis of the therapeutic CGM testing results.    Will continue to encourage vaccines, declines at this visit. Will call with flu shot if interested. Not interested today.     Hi Colbert  reports that he has been smoking cigarettes. He has never  used smokeless tobacco.. I have educated him on the risk of diseases from using tobacco products such as cancer, COPD and heart disease.     I advised him to quit and he is not willing to quit.    I spent 5 minutes counseling the patient.

## 2021-09-15 ENCOUNTER — OFFICE VISIT (OUTPATIENT)
Dept: UROLOGY | Facility: CLINIC | Age: 66
End: 2021-09-15

## 2021-09-15 ENCOUNTER — HOME HEALTH ADMISSION (OUTPATIENT)
Dept: HOME HEALTH SERVICES | Facility: HOME HEALTHCARE | Age: 66
End: 2021-09-15

## 2021-09-15 VITALS — BODY MASS INDEX: 22.38 KG/M2 | HEIGHT: 72 IN | RESPIRATION RATE: 18 BRPM | TEMPERATURE: 98.7 F

## 2021-09-15 DIAGNOSIS — N20.0 KIDNEY STONES: ICD-10-CM

## 2021-09-15 DIAGNOSIS — R35.0 URINE FREQUENCY: Primary | ICD-10-CM

## 2021-09-15 DIAGNOSIS — R33.9 INCOMPLETE BLADDER EMPTYING: ICD-10-CM

## 2021-09-15 PROCEDURE — 99213 OFFICE O/P EST LOW 20 MIN: CPT | Performed by: PHYSICIAN ASSISTANT

## 2021-09-15 PROCEDURE — 51798 US URINE CAPACITY MEASURE: CPT | Performed by: PHYSICIAN ASSISTANT

## 2021-09-29 ENCOUNTER — CLINICAL SUPPORT (OUTPATIENT)
Dept: INTERNAL MEDICINE | Facility: CLINIC | Age: 66
End: 2021-09-29

## 2021-09-29 DIAGNOSIS — M85.89 OSTEOPENIA OF MULTIPLE SITES: Primary | ICD-10-CM

## 2021-09-29 PROCEDURE — 96372 THER/PROPH/DIAG INJ SC/IM: CPT | Performed by: NURSE PRACTITIONER

## 2021-10-22 ENCOUNTER — OFFICE VISIT (OUTPATIENT)
Dept: ENDOCRINOLOGY | Facility: CLINIC | Age: 66
End: 2021-10-22

## 2021-10-22 VITALS
SYSTOLIC BLOOD PRESSURE: 110 MMHG | WEIGHT: 165 LBS | HEART RATE: 78 BPM | OXYGEN SATURATION: 98 % | DIASTOLIC BLOOD PRESSURE: 60 MMHG | BODY MASS INDEX: 22.35 KG/M2 | HEIGHT: 72 IN

## 2021-10-22 DIAGNOSIS — F17.200 SMOKER: ICD-10-CM

## 2021-10-22 DIAGNOSIS — E03.8 OTHER SPECIFIED HYPOTHYROIDISM: ICD-10-CM

## 2021-10-22 DIAGNOSIS — E55.9 VITAMIN D DEFICIENCY: ICD-10-CM

## 2021-10-22 DIAGNOSIS — M81.0 OSTEOPOROSIS, UNSPECIFIED OSTEOPOROSIS TYPE, UNSPECIFIED PATHOLOGICAL FRACTURE PRESENCE: ICD-10-CM

## 2021-10-22 DIAGNOSIS — E10.65 TYPE 1 DIABETES MELLITUS WITH HYPERGLYCEMIA (HCC): Primary | ICD-10-CM

## 2021-10-22 PROCEDURE — 99214 OFFICE O/P EST MOD 30 MIN: CPT | Performed by: NURSE PRACTITIONER

## 2021-10-22 PROCEDURE — 95251 CONT GLUC MNTR ANALYSIS I&R: CPT | Performed by: NURSE PRACTITIONER

## 2021-10-22 NOTE — PROGRESS NOTES
"Chief Complaint  Hypothyroidism    Subjective          Hi Colbert presents to [facility] ENDOCRINOLOGY  History of Present Illness       In office visit      Referring provider ELANA Copeland       65-year-old male presents for follow-up    Reason diabetes mellitus type 1    Diagnosed at the age of 22    Quality not controlled      Lab Results   Component Value Date    HGBA1C 7.90 (H) 05/29/2021       Severity is high    Timing constant                 Macrovascular complications---TIA      Microvascular complications ---neuropathy, no DR      Left great toe amputation         Renal stones --- on Flomax         Current diabetes regimen ---insulin             Current glucose monitoring      Dexcom G6      See below           Review of Systems - General ROS: negative        Objective   Vital Signs:   /60   Pulse 78   Ht 182.9 cm (72\")   Wt 74.8 kg (165 lb)   SpO2 98%   BMI 22.38 kg/m²     Physical Exam  Constitutional:       Appearance: Normal appearance.   Cardiovascular:      Rate and Rhythm: Regular rhythm.      Heart sounds: Normal heart sounds.   Pulmonary:      Breath sounds: Normal breath sounds.   Musculoskeletal:      Cervical back: Normal range of motion.   Neurological:      Mental Status: He is alert.        Result Review :   The following data was reviewed by: ELANA Jasso on 10/22/2021:  Common labs    Common Labsle 5/31/21 5/31/21 6/1/21 6/1/21 6/2/21 6/2/21    0643 0643 0551 0551 0447 0447   Glucose  162 (A)  113 (A)  77   BUN  17  11  9   Creatinine  0.84  0.76  0.85   eGFR Non  Am  91  103  90   Sodium  133 (A)  139  138   Potassium  3.7  3.3 (A)  3.4 (A)   Chloride  102  106  102   Calcium  8.7  8.6  8.8   Albumin  2.70 (A)  2.80 (A)  2.90 (A)   Total Bilirubin  0.3  0.3  0.2   Alkaline Phosphatase  79  110  66   AST (SGOT)  16  30  17   ALT (SGPT)  11  12  10   WBC 15.41 (A)  10.81 (A)  8.96    Hemoglobin 10.5 (A)  10.5 (A)  " 11.1 (A)    Hematocrit 31.0 (A)  30.7 (A)  32.4 (A)    Platelets 135 (A)  147  173    (A) Abnormal value                      Assessment and Plan    Diagnoses and all orders for this visit:    1. Type 1 diabetes mellitus with hyperglycemia (HCC) (Primary)  -     CBC & Differential; Future  -     Comprehensive Metabolic Panel; Future  -     Hemoglobin A1c; Future  -     Microalbumin / Creatinine Urine Ratio - Urine, Clean Catch; Future  -     Protein / Creatinine Ratio, Urine - Urine, Clean Catch; Future  -     TSH; Future  -     Vitamin D 25 Hydroxy; Future  -     Lipid Panel; Future    2. Other specified hypothyroidism  -     CBC & Differential; Future  -     Comprehensive Metabolic Panel; Future  -     Hemoglobin A1c; Future  -     Microalbumin / Creatinine Urine Ratio - Urine, Clean Catch; Future  -     Protein / Creatinine Ratio, Urine - Urine, Clean Catch; Future  -     TSH; Future  -     Vitamin D 25 Hydroxy; Future  -     Lipid Panel; Future    3. Osteoporosis, unspecified osteoporosis type, unspecified pathological fracture presence  -     CBC & Differential; Future  -     Comprehensive Metabolic Panel; Future  -     Hemoglobin A1c; Future  -     Microalbumin / Creatinine Urine Ratio - Urine, Clean Catch; Future  -     Protein / Creatinine Ratio, Urine - Urine, Clean Catch; Future  -     TSH; Future  -     Vitamin D 25 Hydroxy; Future  -     Lipid Panel; Future    4. Vitamin D deficiency  -     CBC & Differential; Future  -     Comprehensive Metabolic Panel; Future  -     Hemoglobin A1c; Future  -     Microalbumin / Creatinine Urine Ratio - Urine, Clean Catch; Future  -     Protein / Creatinine Ratio, Urine - Urine, Clean Catch; Future  -     TSH; Future  -     Vitamin D 25 Hydroxy; Future  -     Lipid Panel; Future    5. Smoker                Glycemic Management:        Type diabetes mellitus type 1            Lab Results   Component Value Date     HGBA1C 7.90 (H) 05/29/2021            Dexcom G6       Downloaded and reviewed     Dated from Oct. 9 to Oct. 22, 2021    Average  bg 164    Time in target 65.8%     High - 34.1%    Very high 3 %     Low 0.1 %     Very low 0 %       Several at goal     He has occasional postprandial hyperglycemia     Occurs with high carb meal     Hypoglycemia early morning or when missing a meal              Taking lantus 9 units in the evening ---decrease to 8 units            Taking Novolog 4 units TID before each meal      Take 5 units before super--give 6 if high carb meal      Plus sliding scale 2 per 50 above 150                                 Microvascular Complications Monitoring         Last eye exam---- June 2021, no DR               Neuropathy --yes                  Lipid Management:      Not on statin            Blood Pressure Management:        Thyroid Health              Lab Results   Component Value Date     TSH 5.960 (H) 05/30/2021         Taking levothyroxine 150 mcg daily                      Weight Management:    Patient's Body mass index is 22.38 kg/m². indicating that he is within normal range (BMI 18.5-24.9). No BMI management plan needed..       Preventive Care:         Smoker      Hi CAMPBELL Filemon  reports that he has been smoking cigarettes. He has never used smokeless tobacco.. I have educated him on the risk of diseases from using tobacco products such as cancer and COPD.     I advised him to quit and he is not willing to quit.    I spent 3  minutes counseling the patient.               bone Health       bone density     Osteoporosis     Based on FRAX score he has osteoporosis 4.6     Taking calcium + d    Prolia every 6 months              Follow Up   Return in about 3 months (around 1/22/2022) for Recheck.  Patient was given instructions and counseling regarding his condition or for health maintenance advice. Please see specific information pulled into the AVS if appropriate.         This document has been electronically signed by Tee Eric  ELANA Gonsales on October 22, 2021 16:31 CDT.

## 2021-11-16 ENCOUNTER — CASE MANAGEMENT (OUTPATIENT)
Dept: CARE COORDINATION | Age: 66
End: 2021-11-16

## 2021-12-09 NOTE — PROGRESS NOTES
Subjective    Mr. Colbert is 66 y.o. male    Chief Complaint: 3 month follow up for Kidney stones.     History of Present Illness  Patient returns for 3-month follow-up he got a KUB to assess for any interval changes with history of stones.  Patient has a history of incomplete bladder emptying most likely related to a combination of diabetes and limited mobility.  Cystoscopy done 08/04/2021 by Dr. Marie patient is on tamsulosin.  He does not have a catheter in place he did not have a catheter placed during his last visit.    The following portions of the patient's history were reviewed and updated as appropriate: allergies, current medications, past family history, past medical history, past social history, past surgical history and problem list.    Review of Systems   Constitutional: Negative for chills and fever.   Gastrointestinal: Negative for abdominal pain, anal bleeding and blood in stool.   Genitourinary: Negative for decreased urine volume, difficulty urinating, dysuria, enuresis, flank pain, frequency, genital sores, hematuria, penile discharge, penile pain, penile swelling, scrotal swelling, testicular pain and urgency.         Current Outpatient Medications:   •  Cholecalciferol 1.25 MG (28014 UT) tablet, Take 1.25 mg by mouth 1 (One) Time Per Week. ADMINISTER EVERY WEDNESDAY, Disp: , Rfl:   •  Continuous Blood Gluc  (Dexcom G6 ) device, 1 each Take As Directed., Disp: 1 each, Rfl: 0  •  Continuous Blood Gluc Sensor (Dexcom G6 Sensor), Every 10 (Ten) Days., Disp: 9 each, Rfl: 3  •  Continuous Blood Gluc Transmit (Dexcom G6 Transmitter) misc, 1 each Take As Directed., Disp: 1 each, Rfl: 0  •  Glucagon, rDNA, (Glucagon Emergency) 1 MG kit, Inject 1 mg as directed As Needed (AS NEEDED FOR LOW SUGAR)., Disp: , Rfl:   •  ibuprofen (ADVIL,MOTRIN) 200 MG tablet, Take 3 tablets by mouth Every 6 (Six) Hours As Needed for Moderate Pain ., Disp: 500 tablet, Rfl: 3  •  insulin aspart (NovoLOG FlexPen)  100 UNIT/ML solution pen-injector sc pen, With meals sliding scale:151-200-2 UNITS 201-250 -4 UNITS 251-300-6 UNITS 301-350-8 UNITS 351-400-10 UNITS 401 +-12 UNITS, Disp: 4 pen, Rfl: 11  •  Insulin Glargine (Lantus SoloStar) 100 UNIT/ML injection pen, Inject 9 Units under the skin into the appropriate area as directed Every Night., Disp: 3 pen, Rfl: 3  •  Insulin Pen Needle 31G X 5 MM misc, 1 Device 4 (Four) Times a Day Before Meals & at Bedtime., Disp: 100 each, Rfl: 3  •  levothyroxine (SYNTHROID, LEVOTHROID) 150 MCG tablet, Take 1 tablet by mouth Daily., Disp: 90 tablet, Rfl: 3  •  potassium chloride (KLOR-CON) 20 MEQ CR tablet, Take 1 tablet by mouth Daily., Disp: 90 tablet, Rfl: 3  •  tamsulosin (FLOMAX) 0.4 MG capsule 24 hr capsule, Take 1 capsule by mouth Every Night., Disp: 90 capsule, Rfl: 3  •  vitamin D (ERGOCALCIFEROL) 1.25 MG (13037 UT) capsule capsule, Take 1 capsule by mouth Every 7 (Seven) Days., Disp: 12 capsule, Rfl: 3  •  denosumab (PROLIA) 60 MG/ML solution prefilled syringe syringe, Inject 1 mL under the skin into the appropriate area as directed 1 (One) Time for 1 dose., Disp: 1 mL, Rfl: 0    Past Medical History:   Diagnosis Date   • Cataract    • Diabetes mellitus (HCC)    • Falls    • Gait abnormality    • GERD (gastroesophageal reflux disease)    • Hypothyroidism    • Muscle spasm    • Nicotine dependence    • Rhabdomyolysis    • Tremor        Past Surgical History:   Procedure Laterality Date   • OTHER SURGICAL HISTORY      cyst on tailbone, left big toe       Social History     Socioeconomic History   • Marital status: Single   Tobacco Use   • Smoking status: Current Every Day Smoker     Types: Cigarettes   • Smokeless tobacco: Never Used   • Tobacco comment: 16-17 cigs a day per pt as of 07/22/2021   Substance and Sexual Activity   • Alcohol use: Defer   • Drug use: Defer   • Sexual activity: Defer       Family History   Problem Relation Age of Onset   • No Known Problems Mother    •  "Alzheimer's disease Father        Objective    Temp 98.7 °F (37.1 °C)   Ht 182.9 cm (72.01\")   Wt 80.6 kg (177 lb 12.8 oz)   BMI 24.11 kg/m²     Physical Exam  Vitals reviewed.   Constitutional:       Appearance: Normal appearance.   HENT:      Head: Normocephalic and atraumatic.   Pulmonary:      Effort: Pulmonary effort is normal.   Musculoskeletal:      Right lower leg: No edema.      Left lower leg: No edema.      Comments: Patient wheelchair-bound   Neurological:      Mental Status: He is alert and oriented to person, place, and time.   Psychiatric:         Mood and Affect: Mood normal.         Behavior: Behavior normal.           International Prostate Symptom Score  The following is posted based on patient questionnaire answers:  0 - not at all    1-7 mild symptoms  1- Less than one time in five  8-19 moderate symptoms  2 -Less than half the time  20-35 severe symptoms  3 - About half the time  4 - More than half the time  5 - Almost always     For following sections:  Incomplete Emptying: - How often have you had the sensation  of not emptying your bladder completely after you finished urinating?  0  Frequency: -How often have you had to urinate again less than   two hours after you finished urinating?      0  Intermittency: -How often have you found you stopped and started again  Several times when you urinate?       2  Urgency: -How often do you find it difficult to postpone urination?             1  Weak stream: - How often have you had a weak urinary stream?             0  Straining: - How often have you had to push or strain to begin  Urination?          1  Sleeping: -How many times did you most typically get up to urinate   From the time you went to bed at night until the time you got up in the   1  Morning          Total `  4    Quality of Life  How would you feel if you had to live with your urinary condition the way   2  It is now, no better, no worse for the rest of your life?    Where: " 0=delighted; 1= pleased, 2= mostly satisfied, 3= mixed, 4 = mostly  Dissatisfied, 5= Unhappy, 6 = terrible    KUB independent review    A KUB is available for me to review today.  The image is inspected for a bowel gas pattern and the general bone structure of the spine and pelvis. The kidneys are then inspected closely.  Renal outline is noted if identifiable. The kidney, collecting system, and anticipated path of the ureter are examined for calcifications including those in the true pelvis.  This film reveals:    On the right there are no calcificaitons seen in the kidney or the expected course of the ureter.     On the left there are no calcificaitons seen in the kidney or the expected course of the ureter.    Assessment and Plan    Diagnoses and all orders for this visit:    1. Kidney stones (Primary)  -     Cancel: POC Urinalysis Dipstick, Multipro    2. Incomplete bladder emptying    Patient voiding subjectively well he has no suprapubic discomfort is not had a catheter placed since he was last seen.  Recommend continue tamsulosin KUB revealed no obvious kidney stones he does have a history of stones in the past.  Recommend follow-up 6 months recommend KUB in 1 year.

## 2021-12-14 ENCOUNTER — HOSPITAL ENCOUNTER (OUTPATIENT)
Dept: GENERAL RADIOLOGY | Facility: HOSPITAL | Age: 66
Discharge: HOME | End: 2021-12-14
Admitting: PHYSICIAN ASSISTANT

## 2021-12-14 DIAGNOSIS — R33.9 INCOMPLETE BLADDER EMPTYING: ICD-10-CM

## 2021-12-14 DIAGNOSIS — R35.0 URINE FREQUENCY: ICD-10-CM

## 2021-12-14 DIAGNOSIS — N20.0 KIDNEY STONES: ICD-10-CM

## 2021-12-14 PROCEDURE — 74018 RADEX ABDOMEN 1 VIEW: CPT

## 2021-12-15 ENCOUNTER — OFFICE VISIT (OUTPATIENT)
Dept: UROLOGY | Facility: CLINIC | Age: 66
End: 2021-12-15

## 2021-12-15 VITALS — TEMPERATURE: 98.7 F | WEIGHT: 177.8 LBS | HEIGHT: 72 IN | BODY MASS INDEX: 24.08 KG/M2

## 2021-12-15 DIAGNOSIS — N20.0 KIDNEY STONES: Primary | ICD-10-CM

## 2021-12-15 DIAGNOSIS — R33.9 INCOMPLETE BLADDER EMPTYING: ICD-10-CM

## 2021-12-15 PROCEDURE — 99213 OFFICE O/P EST LOW 20 MIN: CPT | Performed by: PHYSICIAN ASSISTANT

## 2022-01-25 ENCOUNTER — OFFICE VISIT (OUTPATIENT)
Dept: ENDOCRINOLOGY | Facility: CLINIC | Age: 67
End: 2022-01-25

## 2022-01-25 VITALS
HEART RATE: 102 BPM | DIASTOLIC BLOOD PRESSURE: 80 MMHG | BODY MASS INDEX: 23.84 KG/M2 | SYSTOLIC BLOOD PRESSURE: 110 MMHG | HEIGHT: 72 IN | WEIGHT: 176 LBS | OXYGEN SATURATION: 97 %

## 2022-01-25 DIAGNOSIS — E10.65 TYPE 1 DIABETES MELLITUS WITH HYPERGLYCEMIA: Primary | ICD-10-CM

## 2022-01-25 DIAGNOSIS — F17.200 SMOKER: ICD-10-CM

## 2022-01-25 DIAGNOSIS — M81.0 OSTEOPOROSIS, UNSPECIFIED OSTEOPOROSIS TYPE, UNSPECIFIED PATHOLOGICAL FRACTURE PRESENCE: ICD-10-CM

## 2022-01-25 DIAGNOSIS — E55.9 VITAMIN D DEFICIENCY: ICD-10-CM

## 2022-01-25 PROCEDURE — 99214 OFFICE O/P EST MOD 30 MIN: CPT | Performed by: NURSE PRACTITIONER

## 2022-01-25 PROCEDURE — 95251 CONT GLUC MNTR ANALYSIS I&R: CPT | Performed by: NURSE PRACTITIONER

## 2022-01-25 RX ORDER — PEN NEEDLE, DIABETIC 30 GX3/16"
1 NEEDLE, DISPOSABLE MISCELLANEOUS 4 TIMES DAILY
Qty: 360 EACH | Refills: 3 | Status: SHIPPED | OUTPATIENT
Start: 2022-01-25 | End: 2022-02-10

## 2022-01-25 NOTE — PROGRESS NOTES
"Chief Complaint  Diabetes and Hypothyroidism    Subjective          Hi Colbert presents to [facility]  History of Present Illness      In office visit     Referring provider ELANA Copeland         65-year-old male presents for follow-up     Reason diabetes mellitus type 1     Diagnosed at the age of 22     Quality not controlled        Lab Results   Component Value Date    HGBA1C 7.90 (H) 05/29/2021       Severity is high     Timing constant       Macrovascular complications---TIA      Microvascular complications ---neuropathy, no DR      Left great toe amputation         Renal stones --- on Flomax         Current diabetes regimen ---insulin               Current glucose monitoring      Dexcom G6      See below             Objective   Vital Signs:   /80 (BP Location: Left arm, Patient Position: Sitting)   Pulse 102   Ht 182.9 cm (72\")   Wt 79.8 kg (176 lb)   SpO2 97%   BMI 23.87 kg/m²     Physical Exam  Constitutional:       Appearance: Normal appearance.   Cardiovascular:      Rate and Rhythm: Regular rhythm.      Heart sounds: Normal heart sounds.   Pulmonary:      Breath sounds: Normal breath sounds.   Musculoskeletal:      Cervical back: Normal range of motion.   Neurological:      General: No focal deficit present.      Mental Status: He is alert.   Psychiatric:         Mood and Affect: Mood normal.         Thought Content: Thought content normal.         Judgment: Judgment normal.        Result Review :   The following data was reviewed by: ELANA Jasso on 01/25/2022:  Common labs    Common Labsle 5/31/21 5/31/21 6/1/21 6/1/21 6/2/21 6/2/21    0643 0643 0551 0551 0447 0447   Glucose  162 (A)  113 (A)  77   BUN  17  11  9   Creatinine  0.84  0.76  0.85   eGFR Non  Am  91  103  90   Sodium  133 (A)  139  138   Potassium  3.7  3.3 (A)  3.4 (A)   Chloride  102  106  102   Calcium  8.7  8.6  8.8   Albumin  2.70 (A)  2.80 (A)  " 2.90 (A)   Total Bilirubin  0.3  0.3  0.2   Alkaline Phosphatase  79  110  66   AST (SGOT)  16  30  17   ALT (SGPT)  11  12  10   WBC 15.41 (A)  10.81 (A)  8.96    Hemoglobin 10.5 (A)  10.5 (A)  11.1 (A)    Hematocrit 31.0 (A)  30.7 (A)  32.4 (A)    Platelets 135 (A)  147  173    (A) Abnormal value                      Assessment and Plan    Diagnoses and all orders for this visit:    1. Type 1 diabetes mellitus with hyperglycemia (HCC) (Primary)    2. Osteoporosis, unspecified osteoporosis type, unspecified pathological fracture presence    3. Smoker    4. Vitamin D deficiency    Other orders  -     Insulin Pen Needle (Pen Needles) 32G X 4 MM misc; 1 each 4 (Four) Times a Day. Use 4 x daily, Dx code E11.65  Dispense: 360 each; Refill: 3             Glycemic Management:        Type diabetes mellitus type 1                Lab Results   Component Value Date     HGBA1C 7.90 (H) 05/29/2021            Dexcom G6      Downloaded and reviewed      Dated from Jan. 12 to Jan. 25, 2022    Average bg 202    Time in target 39.4%     High 60.4%     Very high 19.5%     Low 0.2%     Very low 0 %     High over night but he has been missing the lantus --- change to giving Lantus in the a.m.    No changes to mealtime daytime blood glucoses are mostly at goal                           Taking lantus 8 units --- change to giving 8 units every a.m.           Taking Novolog 4 units TID before each meal      Take 5 units before super--give 6 if high carb meal      Plus sliding scale 2 per 50 above 150                                 Microvascular Complications Monitoring         Last eye exam---- June 2021, no DR               Neuropathy --yes                  Lipid Management:      Not on statin            Blood Pressure Management:        Thyroid Health                  Lab Results   Component Value Date     TSH 5.960 (H) 05/30/2021         Taking levothyroxine 150 mcg daily                       Weight Management     Patient's Body mass  index is 23.87 kg/m². indicating that he is within normal range (BMI 18.5-24.9). No BMI management plan needed..       Preventive Care:         Smoker     Hi Colbert  reports that he has been smoking cigarettes. He has never used smokeless tobacco.. I have educated him on the risk of diseases from using tobacco products such as cancer, COPD and heart disease.     I advised him to quit and he is not willing to quit.    I spent 3  minutes counseling the patient.           bone Health       bone density      Osteoporosis      Based on FRAX score he has osteoporosis 4.6      Taking calcium + d     Prolia every 6 months                             Follow Up   Return in about 3 months (around 4/25/2022) for Recheck.  Patient was given instructions and counseling regarding his condition or for health maintenance advice. Please see specific information pulled into the AVS if appropriate.         This document has been electronically signed by ELANA Jasso on January 25, 2022 17:07 CST.

## 2022-01-25 NOTE — PATIENT INSTRUCTIONS
Patient will change lantus to morning instead night  Tonight he will give 4 units of Lantus and tomorrow morning 4 units  Then will give every morning at 8 units

## 2022-02-10 DIAGNOSIS — E10.3393 TYPE 1 DIABETES MELLITUS WITH MODERATE NONPROLIFERATIVE RETINOPATHY OF BOTH EYES WITHOUT MACULAR EDEMA: Primary | ICD-10-CM

## 2022-02-10 RX ORDER — PEN NEEDLE, DIABETIC 32GX 5/32"
NEEDLE, DISPOSABLE MISCELLANEOUS
Qty: 100 EACH | Refills: 1 | Status: SHIPPED | OUTPATIENT
Start: 2022-02-10 | End: 2022-03-23 | Stop reason: SDUPTHER

## 2022-02-28 DIAGNOSIS — E03.9 ACQUIRED HYPOTHYROIDISM: ICD-10-CM

## 2022-02-28 DIAGNOSIS — E10.3393 TYPE 1 DIABETES MELLITUS WITH MODERATE NONPROLIFERATIVE RETINOPATHY OF BOTH EYES WITHOUT MACULAR EDEMA: ICD-10-CM

## 2022-02-28 RX ORDER — PROCHLORPERAZINE 25 MG/1
1 SUPPOSITORY RECTAL TAKE AS DIRECTED
Qty: 1 EACH | Refills: 0 | Status: SHIPPED | OUTPATIENT
Start: 2022-02-28

## 2022-02-28 NOTE — TELEPHONE ENCOUNTER
Caller: ELIA MUNGUIA    Relationship to patient: Emergency Contact    Best call back number:  [phone] (H)     Patient is needing: Elia called regarding Dexcom  and transmitter - he is unsure how these are ordered, whether they go thru [employer] or not. He said that he thinks that they are ordered thru the following company:      [employer]   [address] [place] [address]    Provider line: [phone]

## 2022-03-08 ENCOUNTER — TELEPHONE (OUTPATIENT)
Dept: INTERNAL MEDICINE | Facility: CLINIC | Age: 67
End: 2022-03-08

## 2022-03-08 NOTE — TELEPHONE ENCOUNTER
Caller: EUGENIE MUNGUIA     Relationship: BROTHER     Best call back number: [phone] (H)    Requested Prescriptions: RENEW MEDICATION      DEXCON 6 WITH TWO PARTS SENSOR AND TRANSMITTER     Pharmacy where request should be sent:    [facility] - [facility] [address] - [phone] PH - [phone] FX  [phone]      Additional details provided by patient: HE STATES HE DID NOT MEET THE SPECIFICATIONS TO BE APPROVED FOR THE MEDICATION FOR MEDICARE     STATES TO PLEASE CALL AC -MEDICARE FORM THAT WOULD BE NEEDED TO POSSIBLY APPROVE MEDICATION   [phone]       REQUESTING CALL BACK     Devi Gomes Rep   03/08/22 11:41 CST

## 2022-03-09 NOTE — TELEPHONE ENCOUNTER
Spoke with someone in the orders department. States that they will fax the order for Kesha to sign. They will need the office note on March 15. She said the patient or family member will need to call and order the supplies that they are needing.

## 2022-03-15 ENCOUNTER — OFFICE VISIT (OUTPATIENT)
Dept: INTERNAL MEDICINE | Facility: CLINIC | Age: 67
End: 2022-03-15

## 2022-03-15 VITALS
OXYGEN SATURATION: 100 % | SYSTOLIC BLOOD PRESSURE: 90 MMHG | HEIGHT: 72 IN | TEMPERATURE: 97.8 F | WEIGHT: 174 LBS | HEART RATE: 83 BPM | DIASTOLIC BLOOD PRESSURE: 70 MMHG | BODY MASS INDEX: 23.57 KG/M2

## 2022-03-15 DIAGNOSIS — E10.3393 TYPE 1 DIABETES MELLITUS WITH MODERATE NONPROLIFERATIVE RETINOPATHY OF BOTH EYES WITHOUT MACULAR EDEMA: ICD-10-CM

## 2022-03-15 DIAGNOSIS — Z86.73 HISTORY OF STROKE: ICD-10-CM

## 2022-03-15 DIAGNOSIS — Z79.899 ENCOUNTER FOR LONG-TERM (CURRENT) USE OF OTHER MEDICATIONS: ICD-10-CM

## 2022-03-15 DIAGNOSIS — Z12.5 SCREENING PSA (PROSTATE SPECIFIC ANTIGEN): ICD-10-CM

## 2022-03-15 DIAGNOSIS — E55.9 VITAMIN D DEFICIENCY: ICD-10-CM

## 2022-03-15 DIAGNOSIS — E03.9 ACQUIRED HYPOTHYROIDISM: Primary | ICD-10-CM

## 2022-03-15 PROCEDURE — 1126F AMNT PAIN NOTED NONE PRSNT: CPT | Performed by: NURSE PRACTITIONER

## 2022-03-15 PROCEDURE — G0438 PPPS, INITIAL VISIT: HCPCS | Performed by: NURSE PRACTITIONER

## 2022-03-15 PROCEDURE — 1170F FXNL STATUS ASSESSED: CPT | Performed by: NURSE PRACTITIONER

## 2022-03-15 PROCEDURE — 1159F MED LIST DOCD IN RCRD: CPT | Performed by: NURSE PRACTITIONER

## 2022-03-15 NOTE — TELEPHONE ENCOUNTER
Patient/brother will need to call this company and order the supplies that he is needing. We will need to send back the paper work and office note to the company.

## 2022-03-15 NOTE — PROGRESS NOTES
QUICK REFERENCE INFORMATION:  The ABCs of the Annual Wellness Visit    Initial Medicare Wellness Visit    Hi presents today for Medical Wellness visit.  He is a type 1 diabetic patient using Dexcom to manage his blood sugars.  He states recently that Medicare would not cover his sensors due to issues with them obtaining a note.  He demonstrates how he uses this to monitor in the office today.  Blood sugars showing range between 120-290s depending on the time of day. He is managed by endocrinology.  He does not have an insulin pump.    He has a history of stroke.  He has some bilateral weakness with L>R.  He states he has numbness in his fingertips and limited sensation to the bilateral feet.  He does have a history of falls but reports his last fall was about 1 year ago.  He continues to smoke and states he is not ready to quit.  He used to take a baby aspirin but no longer does this.    Refuse Colonoscopy and cologuard      HEALTH RISK ASSESSMENT    : 1955    Recent Hospitalizations:  No hospitalization(s) within the last year..  ccc      Current Medical Providers:  Patient Care Team:  Kesha Del Cid APRN as PCP - General (Nurse Practitioner)        Smoking Status:  Social History     Tobacco Use   Smoking Status Current Every Day Smoker   • Types: Cigarettes   Smokeless Tobacco Never Used   Tobacco Comment    16-17 cigs a day per pt as of 2021       Alcohol Consumption:  Social History     Substance and Sexual Activity   Alcohol Use Defer       Depression Screen:   PHQ-2/PHQ-9 Depression Screening 3/15/2022   Little Interest or Pleasure in Doing Things 0-->not at all   Feeling Down, Depressed or Hopeless 0-->not at all   PHQ-9: Brief Depression Severity Measure Score 0       Health Habits and Functional and Cognitive Screening:  Functional & Cognitive Status 3/15/2022   Do you have difficulty preparing food and eating? No   Do you have difficulty bathing yourself, getting dressed  or grooming yourself? No   Do you have difficulty using the toilet? No   Do you have difficulty moving around from place to place? Yes   Do you have trouble with steps or getting out of a bed or a chair? No   Current Diet Well Balanced Diet   Dental Exam Unknown   Eye Exam Unknown   Exercise (times per week) 0 times per week   Current Exercises Include No Regular Exercise   Do you need help using the phone?  No   Are you deaf or do you have serious difficulty hearing?  No   Do you need help with transportation? No   Do you need help shopping? No   Do you need help preparing meals?  No   Do you need help with housework?  No   Do you need help with laundry? No   Do you need help taking your medications? No   Do you need help managing money? No   Do you ever drive or ride in a car without wearing a seat belt? No   Have you felt unusual stress, anger or loneliness in the last month? No   Who do you live with? Community   If you need help, do you have trouble finding someone available to you? No   Have you been bothered in the last four weeks by sexual problems? No   Do you have difficulty concentrating, remembering or making decisions? No       Visual Acuity:  No exam data present    Does the patient have evidence of cognitive impairment? No    Asiprin use counseling: Does not currently take Aspirin and recommended he start this.       Recent Lab Results:    Lab Results   Component Value Date     (H) 02/21/2021     Lab Results   Component Value Date    HGBA1C 7.90 (H) 05/29/2021     Lab Results   Component Value Date    CHOL 92 05/30/2021    TRIG 87 05/30/2021    HDL 48 05/30/2021    VLDL 17 05/30/2021    LDLHDL 0.55 05/30/2021           Age-appropriate Screening Schedule:  Refer to the list below for future screening recommendations based on patient's age, sex and/or medical conditions. Orders for these recommended tests are listed in the plan section. The patient has been provided with a written plan.    Health  Maintenance   Topic Date Due   • ZOSTER VACCINE (1 of 2) Never done   • DIABETIC FOOT EXAM  Never done   • INFLUENZA VACCINE  08/01/2021   • URINE MICROALBUMIN  10/13/2021   • HEMOGLOBIN A1C  11/29/2021   • TDAP/TD VACCINES (2 - Td or Tdap) 10/26/2022   • DIABETIC EYE EXAM  02/07/2023   • DXA SCAN  08/04/2023        Subjective   History of Present Illness    Hi Colbert is a 66 y.o. male who presents for an Annual Wellness Visit.    The following portions of the patient's history were reviewed and updated as appropriate: allergies, current medications, past family history, past medical history, past social history, past surgical history and problem list.    Outpatient Medications Prior to Visit   Medication Sig Dispense Refill   • BD Pen Needle Stephanie U/F 32G X 4 MM misc USE TO TEST 3 TIMES A  each 1   • Cholecalciferol 1.25 MG (32435 UT) tablet Take 1.25 mg by mouth 1 (One) Time Per Week. ADMINISTER EVERY WEDNESDAY     • Continuous Blood Gluc  (Dexcom G6 ) device 1 each Take As Directed. 1 each 0   • Continuous Blood Gluc Sensor (Dexcom G6 Sensor) Every 10 (Ten) Days. 9 each 3   • Continuous Blood Gluc Transmit (Dexcom G6 Transmitter) misc 1 each Take As Directed. 1 each 0   • denosumab (PROLIA) 60 MG/ML solution prefilled syringe syringe Inject 1 mL under the skin into the appropriate area as directed 1 (One) Time for 1 dose. 1 mL 0   • Glucagon, rDNA, (Glucagon Emergency) 1 MG kit Inject 1 mg as directed As Needed (AS NEEDED FOR LOW SUGAR).     • ibuprofen (ADVIL,MOTRIN) 200 MG tablet Take 3 tablets by mouth Every 6 (Six) Hours As Needed for Moderate Pain . 500 tablet 3   • insulin aspart (NovoLOG FlexPen) 100 UNIT/ML solution pen-injector sc pen With meals sliding scale:151-200-2 UNITS 201-250 -4 UNITS 251-300-6 UNITS 301-350-8 UNITS 351-400-10 UNITS 401 +-12 UNITS 4 pen 11   • Insulin Glargine (Lantus SoloStar) 100 UNIT/ML injection pen Inject 9 Units under the skin into the  appropriate area as directed Every Night. 3 pen 3   • levothyroxine (SYNTHROID, LEVOTHROID) 150 MCG tablet Take 1 tablet by mouth Daily. 90 tablet 3   • potassium chloride (KLOR-CON) 20 MEQ CR tablet Take 1 tablet by mouth Daily. 90 tablet 3   • tamsulosin (FLOMAX) 0.4 MG capsule 24 hr capsule Take 1 capsule by mouth Every Night. 90 capsule 3   • vitamin D (ERGOCALCIFEROL) 1.25 MG (35353 UT) capsule capsule Take 1 capsule by mouth Every 7 (Seven) Days. 12 capsule 3     No facility-administered medications prior to visit.       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 ([facility])   • Unable to care for self   • Diabetic ketoacidosis without coma associated with type 1 diabetes mellitus ([facility])   • UTI (urinary tract infection) due to urinary indwelling catheter ([facility])   • Epiretinal membrane   • Ingrown nail   • Lumbar spinal stenosis   • Metatarsalgia of both feet   • Peripheral autonomic neuropathy in disorders classified elsewhere   • Type 1 diabetes mellitus with hyperglycemia ([facility])   • Vitamin D deficiency   • Osteoporosis       Advance Care Planning:  ACP discussion was held with the patient during this visit. Patient has an advance directive (not in EMR), copy requested.    Identification of Risk Factors:  Risk factors include: Advance Directive Discussion  Cardiovascular risk  Chronic Pain   Colon Cancer Screening  Dementia/Memory   Depression/Dysphoria  Diabetic Lab Screening   Fall Risk  Immunizations Discussed/Encouraged (specific immunizations; Tdap, Influenza, Pneumococcal 23, Shingrix and  COVID19 )  Inadequate Social Support, Isolation, Loneliness, Lack of Transportation, Financial Difficulties, or Caregiver Stress   Inactivity/Sedentary  Prostate Cancer Screening   Tobacco Use/Dependance (use dotphrase .tobaccocessation for documentation).    Review of Systems    Compared to one year ago, the patient feels his physical health is the same.  Compared to one year ago, the patient feels his mental health is the same.    Objective     Physical Exam  Constitutional:       Appearance: He is well-developed.   HENT:      Head: Normocephalic.      Right Ear: Tympanic membrane and external ear normal.      Left Ear: Tympanic membrane and external ear normal.      Nose: Nose normal.      Mouth/Throat:      Mouth: Mucous membranes are moist. No oral lesions.      Pharynx: Oropharynx is clear.   Eyes:      Conjunctiva/sclera: Conjunctivae normal.      Pupils: Pupils are equal, round, and reactive to light.   Neck:      Thyroid: No thyromegaly.      Vascular: No carotid bruit.   Cardiovascular:      Rate and Rhythm: Normal rate and regular rhythm.      Pulses: Normal pulses.           Radial pulses are 2+ on the right side and 2+ on the left side.      Heart sounds: Normal heart sounds. No murmur heard.  Pulmonary:      Effort: Pulmonary effort is normal.      Breath sounds: Normal breath sounds.   Abdominal:      General: Bowel sounds are normal.      Palpations: Abdomen is soft.      Tenderness: There is no abdominal tenderness.   Musculoskeletal:      Cervical back: Normal range of motion.      Right lower leg: No edema.      Left lower leg: No edema.      Comments: Ambulated slowly and primarily in a wheelchair.  Able to get on exam table with assistance.  Fingers are some what curled under when climbing exam table.  He is able to work dexcom device without difficulty.    Feet:      Right foot:      Skin integrity: Skin integrity normal.      Left foot:      Skin integrity: Skin integrity normal.   Skin:      "General: Skin is warm and dry.   Neurological:      Mental Status: He is alert and oriented to person, place, and time.      Gait: Gait normal.   Psychiatric:         Mood and Affect: Mood normal.         Speech: Speech normal.         Behavior: Behavior normal.         Thought Content: Thought content normal.         Vitals:    03/15/22 1405   BP: 90/70   BP Location: Left arm   Patient Position: Sitting   Cuff Size: Adult   Pulse: 83   Temp: 97.8 °F (36.6 °C)   TempSrc: Temporal   SpO2: 100%   Weight: 78.9 kg (174 lb)   Height: 182.9 cm (72\")   PainSc: 0-No pain       Patient's Body mass index is 23.6 kg/m². indicating that he is within normal range (BMI 18.5-24.9). No BMI management plan needed..      Procedure   Procedures       Assessment/Plan   Patient Self-Management and Personalized Health Advice  The patient has been provided with information about: diet, weight management, tobacco cessation and fall prevention.    Visit Diagnoses:    ICD-10-CM ICD-9-CM   1. Acquired hypothyroidism  E03.9 244.9   2. Vitamin D deficiency  E55.9 268.9   3. Encounter for long-term (current) use of other medications  Z79.899 V58.69   4. 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 Screening PSA (prostate specific antigen)  Z12.5 V76.44   6. History of stroke  Z86.73 V12.54       Orders Placed This Encounter   Procedures   • Comprehensive Metabolic Panel     Order Specific Question:   Release to patient     Answer:   Immediate   • Lipid Panel   • Uric Acid     Order Specific Question:   Release to patient     Answer:   Immediate   • TSH     Order Specific Question:   Release to patient     Answer:   Immediate   • Urinalysis With Microscopic If Indicated (No Culture) - Urine, Clean Catch     Order Specific Question:   Release to patient     Answer:   Immediate   • Vitamin D 25 Hydroxy     Order Specific Question:   Release to patient "     Answer:   Immediate   • POC Glycated Hemoglobin, Total     Order Specific Question:   Release to patient     Answer:   Immediate   • CBC & Differential     Order Specific Question:   Manual Differential     Answer:   No       Outpatient Encounter Medications as of 3/15/2022   Medication Sig Dispense Refill   • BD Pen Needle Stephanie U/F 32G X 4 MM misc USE TO TEST 3 TIMES A  each 1   • Cholecalciferol 1.25 MG (30987 UT) tablet Take 1.25 mg by mouth 1 (One) Time Per Week. ADMINISTER EVERY WEDNESDAY     • Continuous Blood Gluc  (Dexcom G6 ) device 1 each Take As Directed. 1 each 0   • Continuous Blood Gluc Sensor (Dexcom G6 Sensor) Every 10 (Ten) Days. 9 each 3   • Continuous Blood Gluc Transmit (Dexcom G6 Transmitter) misc 1 each Take As Directed. 1 each 0   • denosumab (PROLIA) 60 MG/ML solution prefilled syringe syringe Inject 1 mL under the skin into the appropriate area as directed 1 (One) Time for 1 dose. 1 mL 0   • Glucagon, rDNA, (Glucagon Emergency) 1 MG kit Inject 1 mg as directed As Needed (AS NEEDED FOR LOW SUGAR).     • ibuprofen (ADVIL,MOTRIN) 200 MG tablet Take 3 tablets by mouth Every 6 (Six) Hours As Needed for Moderate Pain . 500 tablet 3   • insulin aspart (NovoLOG FlexPen) 100 UNIT/ML solution pen-injector sc pen With meals sliding scale:151-200-2 UNITS 201-250 -4 UNITS 251-300-6 UNITS 301-350-8 UNITS 351-400-10 UNITS 401 +-12 UNITS 4 pen 11   • Insulin Glargine (Lantus SoloStar) 100 UNIT/ML injection pen Inject 9 Units under the skin into the appropriate area as directed Every Night. 3 pen 3   • levothyroxine (SYNTHROID, LEVOTHROID) 150 MCG tablet Take 1 tablet by mouth Daily. 90 tablet 3   • potassium chloride (KLOR-CON) 20 MEQ CR tablet Take 1 tablet by mouth Daily. 90 tablet 3   • tamsulosin (FLOMAX) 0.4 MG capsule 24 hr capsule Take 1 capsule by mouth Every Night. 90 capsule 3   • vitamin D (ERGOCALCIFEROL) 1.25 MG (04295 UT) capsule capsule Take 1 capsule by mouth Every  7 (Seven) Days. 12 capsule 3     No facility-administered encounter medications on file as of 3/15/2022.       Reviewed use of high risk medication in the elderly: yes  Reviewed for potential of harmful drug interactions in the elderly: yes    Follow Up:  Return in about 6 months (around 9/15/2022) for Recheck.     An After Visit Summary and PPPS with all of these plans were given to the patient.       BP recheck in office was 100/50.  States they check it at [facility].  He hasn't noticed any issues there.   Advised he start daily baby aspirin due to smoking status and past stroke.    He refuses colonoscopy or cologuard.    Will get lab work completed today as well.  Not yet due for PSA.  Sees urology for this.   Continue with endocrinology for diabetes management.

## 2022-03-16 DIAGNOSIS — E03.9 ACQUIRED HYPOTHYROIDISM: ICD-10-CM

## 2022-03-16 LAB
25(OH)D3+25(OH)D2 SERPL-MCNC: 38.2 NG/ML (ref 30–100)
ALBUMIN SERPL-MCNC: 4.1 G/DL (ref 3.5–5.2)
ALBUMIN/GLOB SERPL: 1.6 G/DL
ALP SERPL-CCNC: 83 U/L (ref 39–117)
ALT SERPL-CCNC: 16 U/L (ref 1–41)
AST SERPL-CCNC: 14 U/L (ref 1–40)
BASOPHILS # BLD AUTO: 0.06 10*3/MM3 (ref 0–0.2)
BASOPHILS NFR BLD AUTO: 0.8 % (ref 0–1.5)
BILIRUB SERPL-MCNC: 0.2 MG/DL (ref 0–1.2)
BUN SERPL-MCNC: 23 MG/DL (ref 8–23)
BUN/CREAT SERPL: 18.7 (ref 7–25)
CALCIUM SERPL-MCNC: 9.1 MG/DL (ref 8.6–10.5)
CHLORIDE SERPL-SCNC: 105 MMOL/L (ref 98–107)
CHOLEST SERPL-MCNC: 169 MG/DL (ref 0–200)
CO2 SERPL-SCNC: 26.4 MMOL/L (ref 22–29)
CREAT SERPL-MCNC: 1.23 MG/DL (ref 0.76–1.27)
EGFRCR SERPLBLD CKD-EPI 2021: 64.3 ML/MIN/1.73
EOSINOPHIL # BLD AUTO: 0.2 10*3/MM3 (ref 0–0.4)
EOSINOPHIL NFR BLD AUTO: 2.8 % (ref 0.3–6.2)
ERYTHROCYTE [DISTWIDTH] IN BLOOD BY AUTOMATED COUNT: 11.8 % (ref 12.3–15.4)
GLOBULIN SER CALC-MCNC: 2.5 GM/DL
GLUCOSE SERPL-MCNC: 235 MG/DL (ref 65–99)
HBA1C MFR BLD: 8.8 % (ref 4.8–5.6)
HCT VFR BLD AUTO: 40.6 % (ref 37.5–51)
HDLC SERPL-MCNC: 57 MG/DL (ref 40–60)
HGB BLD-MCNC: 13.3 G/DL (ref 13–17.7)
IMM GRANULOCYTES # BLD AUTO: 0.02 10*3/MM3 (ref 0–0.05)
IMM GRANULOCYTES NFR BLD AUTO: 0.3 % (ref 0–0.5)
LDLC SERPL CALC-MCNC: 94 MG/DL (ref 0–100)
LYMPHOCYTES # BLD AUTO: 1.28 10*3/MM3 (ref 0.7–3.1)
LYMPHOCYTES NFR BLD AUTO: 18 % (ref 19.6–45.3)
Lab: NORMAL
MCH RBC QN AUTO: 31 PG (ref 26.6–33)
MCHC RBC AUTO-ENTMCNC: 32.8 G/DL (ref 31.5–35.7)
MCV RBC AUTO: 94.6 FL (ref 79–97)
MONOCYTES # BLD AUTO: 0.72 10*3/MM3 (ref 0.1–0.9)
MONOCYTES NFR BLD AUTO: 10.1 % (ref 5–12)
NEUTROPHILS # BLD AUTO: 4.84 10*3/MM3 (ref 1.7–7)
NEUTROPHILS NFR BLD AUTO: 68 % (ref 42.7–76)
NRBC BLD AUTO-RTO: 0 /100 WBC (ref 0–0.2)
PLATELET # BLD AUTO: 174 10*3/MM3 (ref 140–450)
POTASSIUM SERPL-SCNC: 5.2 MMOL/L (ref 3.5–5.2)
PROT SERPL-MCNC: 6.6 G/DL (ref 6–8.5)
RBC # BLD AUTO: 4.29 10*6/MM3 (ref 4.14–5.8)
SODIUM SERPL-SCNC: 140 MMOL/L (ref 136–145)
TRIGL SERPL-MCNC: 97 MG/DL (ref 0–150)
TSH SERPL DL<=0.005 MIU/L-ACNC: 5.24 UIU/ML (ref 0.27–4.2)
URATE SERPL-MCNC: 5 MG/DL (ref 3.4–7)
VLDLC SERPL CALC-MCNC: 18 MG/DL (ref 5–40)
WBC # BLD AUTO: 7.12 10*3/MM3 (ref 3.4–10.8)
WRITTEN AUTHORIZATION: NORMAL

## 2022-03-16 RX ORDER — LEVOTHYROXINE SODIUM 175 UG/1
175 TABLET ORAL DAILY
Qty: 30 TABLET | Refills: 2 | Status: SHIPPED | OUTPATIENT
Start: 2022-03-16 | End: 2022-08-17 | Stop reason: SDUPTHER

## 2022-03-17 LAB
APPEARANCE UR: CLEAR
BACTERIA #/AREA URNS HPF: ABNORMAL /HPF
BILIRUB UR QL STRIP: NEGATIVE
CASTS URNS MICRO: ABNORMAL
COLOR UR: YELLOW
EPI CELLS #/AREA URNS HPF: ABNORMAL /HPF
GLUCOSE UR QL STRIP: ABNORMAL
HGB UR QL STRIP: NEGATIVE
KETONES UR QL STRIP: NEGATIVE
LEUKOCYTE ESTERASE UR QL STRIP: NEGATIVE
NITRITE UR QL STRIP: POSITIVE
PH UR STRIP: 6 [PH] (ref 5–8)
PROT UR QL STRIP: NEGATIVE
RBC #/AREA URNS HPF: ABNORMAL /HPF
SP GR UR STRIP: 1.02 (ref 1–1.03)
UROBILINOGEN UR STRIP-MCNC: ABNORMAL MG/DL
WBC #/AREA URNS HPF: ABNORMAL /HPF

## 2022-03-17 RX ORDER — NITROFURANTOIN 25; 75 MG/1; MG/1
100 CAPSULE ORAL 2 TIMES DAILY
Qty: 14 CAPSULE | Refills: 0 | Status: SHIPPED | OUTPATIENT
Start: 2022-03-17 | End: 2022-03-24

## 2022-03-22 DIAGNOSIS — E10.3393 TYPE 1 DIABETES MELLITUS WITH MODERATE NONPROLIFERATIVE RETINOPATHY OF BOTH EYES WITHOUT MACULAR EDEMA: ICD-10-CM

## 2022-03-22 RX ORDER — INSULIN GLARGINE 100 [IU]/ML
9 INJECTION, SOLUTION SUBCUTANEOUS NIGHTLY
Qty: 3 PEN | Refills: 0 | Status: SHIPPED | OUTPATIENT
Start: 2022-03-22 | End: 2022-08-15 | Stop reason: SDUPTHER

## 2022-03-22 RX ORDER — INSULIN ASPART 100 [IU]/ML
INJECTION, SOLUTION INTRAVENOUS; SUBCUTANEOUS
Qty: 4 PEN | Refills: 0 | Status: SHIPPED | OUTPATIENT
Start: 2022-03-22 | End: 2022-08-15 | Stop reason: SDUPTHER

## 2022-03-22 RX ORDER — ERGOCALCIFEROL 1.25 MG/1
50000 CAPSULE ORAL
Qty: 12 CAPSULE | Refills: 0 | Status: SHIPPED | OUTPATIENT
Start: 2022-03-22 | End: 2022-08-17 | Stop reason: SDUPTHER

## 2022-03-23 ENCOUNTER — TELEPHONE (OUTPATIENT)
Dept: INTERNAL MEDICINE | Facility: CLINIC | Age: 67
End: 2022-03-23

## 2022-03-23 DIAGNOSIS — E10.3393 TYPE 1 DIABETES MELLITUS WITH MODERATE NONPROLIFERATIVE RETINOPATHY OF BOTH EYES WITHOUT MACULAR EDEMA: ICD-10-CM

## 2022-03-23 RX ORDER — PEN NEEDLE, DIABETIC 32GX 5/32"
1 NEEDLE, DISPOSABLE MISCELLANEOUS 4 TIMES DAILY
Qty: 200 EACH | Refills: 1 | Status: SHIPPED | OUTPATIENT
Start: 2022-03-23 | End: 2022-05-03 | Stop reason: SDUPTHER

## 2022-03-23 NOTE — TELEPHONE ENCOUNTER
Caller: EUGENIE MUNGUIA    Relationship: Emergency Contact    Best call back number: [phone]    What medications are you currently taking:   Current Outpatient Medications on File Prior to Visit   Medication Sig Dispense Refill   • BD Pen Needle Stephanie U/F 32G X 4 MM misc USE TO TEST 3 TIMES A  each 1   • Continuous Blood Gluc  (Dexcom G6 ) device 1 each Take As Directed. 1 each 0   • Continuous Blood Gluc Sensor (Dexcom G6 Sensor) Every 10 (Ten) Days. 9 each 3   • Continuous Blood Gluc Transmit (Dexcom G6 Transmitter) misc 1 each Take As Directed. 1 each 0   • denosumab (PROLIA) 60 MG/ML solution prefilled syringe syringe Inject 1 mL under the skin into the appropriate area as directed 1 (One) Time for 1 dose. 1 mL 0   • Glucagon, rDNA, (Glucagon Emergency) 1 MG kit Inject 1 mg as directed As Needed (AS NEEDED FOR LOW SUGAR).     • ibuprofen (ADVIL,MOTRIN) 200 MG tablet Take 3 tablets by mouth Every 6 (Six) Hours As Needed for Moderate Pain . 500 tablet 3   • insulin aspart (NovoLOG FlexPen) 100 UNIT/ML solution pen-injector sc pen With meals sliding scale:151-200-2 UNITS 201-250 -4 UNITS 251-300-6 UNITS 301-350-8 UNITS 351-400-10 UNITS 401 +-12 UNITS 4 pen 0   • Insulin Glargine (Lantus SoloStar) 100 UNIT/ML injection pen Inject 9 Units under the skin into the appropriate area as directed Every Night. 3 pen 0   • levothyroxine (SYNTHROID, LEVOTHROID) 175 MCG tablet Take 1 tablet by mouth Daily. 30 tablet 2   • nitrofurantoin, macrocrystal-monohydrate, (Macrobid) 100 MG capsule Take 1 capsule by mouth 2 (Two) Times a Day for 7 days. 14 capsule 0   • potassium chloride (KLOR-CON) 20 MEQ CR tablet Take 1 tablet by mouth Daily. 90 tablet 3   • tamsulosin (FLOMAX) 0.4 MG capsule 24 hr capsule Take 1 capsule by mouth Every Night. 90 capsule 3   • vitamin D (ERGOCALCIFEROL) 1.25 MG (36890 UT) capsule capsule Take 1 capsule by mouth Every 7 (Seven) Days. 12 capsule 0     No current  facility-administered medications on file prior to visit.      When did you start taking these medications: BEEN ON AWHILE     Which medication are you concerned about: NEEDLES     Who prescribed you this medication: FREDIS RAHMAN    What are your concerns: RUNS OUT EARLY DUE TO DIRECTIONS BEING WRONG, HE IS TO USE 4 NEEDLES A DAY INSTEAD OF 3

## 2022-04-19 ENCOUNTER — TELEPHONE (OUTPATIENT)
Dept: INTERNAL MEDICINE | Facility: CLINIC | Age: 67
End: 2022-04-19

## 2022-04-19 DIAGNOSIS — R29.6 MULTIPLE FALLS: ICD-10-CM

## 2022-04-19 DIAGNOSIS — Z86.73 HISTORY OF STROKE: Primary | ICD-10-CM

## 2022-04-19 DIAGNOSIS — M62.81 MUSCLE WEAKNESS (GENERALIZED): ICD-10-CM

## 2022-04-19 NOTE — TELEPHONE ENCOUNTER
Caller: EUGENIE MUNGUIA    Relationship: Emergency Contact    Best call back number: [phone]    What is the medical concern/diagnosis: PHYSICAL THERAPY     What specialty or service is being requested: THERAPY     What is the provider, practice or medical service name: SAME ONE HE HAS BEEN ATTENDING

## 2022-04-19 NOTE — TELEPHONE ENCOUNTER
I am not familiar with this patient, but I talked with his brother and he says he resides at [facility] and has been getting PT there.  He is asking us to renew his PT order because otherwise, he just sits in his chair and doesn't get up.  Ok to place order for PT and fax to [place]?

## 2022-04-25 ENCOUNTER — TELEPHONE (OUTPATIENT)
Dept: INTERNAL MEDICINE | Facility: CLINIC | Age: 67
End: 2022-04-25

## 2022-04-25 NOTE — TELEPHONE ENCOUNTER
Have not received anything on PT at [facility]  Wanting us to re submit this request.  No longer called [facility]    [phone]  Davina

## 2022-04-25 NOTE — TELEPHONE ENCOUNTER
Could not reach Kamilah JAMISON Or Kamilah EVANS Informed Pt's brother Elia that  referral was faxed. He also asked if the medicare wellness got completed & sent. Please call to confirm this info.

## 2022-04-26 NOTE — TELEPHONE ENCOUNTER
Called [facility] and talked with Adilia - she says she is waiting on verification from insurance before they can proceed.  She expects to be in contact with him in the next 1-2 days.  I do not know who Davina is, that is listed on the original message, so am not calling her back.

## 2022-05-03 ENCOUNTER — OFFICE VISIT (OUTPATIENT)
Dept: ENDOCRINOLOGY | Facility: CLINIC | Age: 67
End: 2022-05-03

## 2022-05-03 VITALS
HEART RATE: 82 BPM | WEIGHT: 171.9 LBS | SYSTOLIC BLOOD PRESSURE: 92 MMHG | BODY MASS INDEX: 23.28 KG/M2 | DIASTOLIC BLOOD PRESSURE: 72 MMHG | HEIGHT: 72 IN | OXYGEN SATURATION: 95 %

## 2022-05-03 DIAGNOSIS — E10.65 TYPE 1 DIABETES MELLITUS WITH HYPERGLYCEMIA: Primary | ICD-10-CM

## 2022-05-03 DIAGNOSIS — M81.0 OSTEOPOROSIS, UNSPECIFIED OSTEOPOROSIS TYPE, UNSPECIFIED PATHOLOGICAL FRACTURE PRESENCE: ICD-10-CM

## 2022-05-03 DIAGNOSIS — F17.200 SMOKER: ICD-10-CM

## 2022-05-03 DIAGNOSIS — E03.8 OTHER SPECIFIED HYPOTHYROIDISM: ICD-10-CM

## 2022-05-03 DIAGNOSIS — E08.42 DIABETIC POLYNEUROPATHY ASSOCIATED WITH DIABETES MELLITUS DUE TO UNDERLYING CONDITION: ICD-10-CM

## 2022-05-03 DIAGNOSIS — E10.3393 TYPE 1 DIABETES MELLITUS WITH MODERATE NONPROLIFERATIVE RETINOPATHY OF BOTH EYES WITHOUT MACULAR EDEMA: ICD-10-CM

## 2022-05-03 PROCEDURE — 99214 OFFICE O/P EST MOD 30 MIN: CPT | Performed by: NURSE PRACTITIONER

## 2022-05-03 PROCEDURE — 95251 CONT GLUC MNTR ANALYSIS I&R: CPT | Performed by: NURSE PRACTITIONER

## 2022-05-03 RX ORDER — PEN NEEDLE, DIABETIC 32GX 5/32"
1 NEEDLE, DISPOSABLE MISCELLANEOUS 4 TIMES DAILY
Qty: 200 EACH | Refills: 11 | Status: SHIPPED | OUTPATIENT
Start: 2022-05-03

## 2022-05-03 NOTE — PATIENT INSTRUCTIONS
Taking lantus  8 units every a.m.--increase to 9 units            Taking Novolog 4 units before breakfast and lunch     For breakfast give 5 units     Keep 4 units for lunch     Taking 5 units for supper     +     Sliding scale     2 per 50 above 150

## 2022-05-03 NOTE — PROGRESS NOTES
"Chief Complaint  Diabetes    Subjective     {Problem List  Visit Diagnosis   Encounters  Notes  Medications  Labs  Result Review Imaging  Media :23}     Hi Colbert presents to [facility] ENDOCR[facility]  History of Present Illness      In office visit     66 year old male presents for follow up      Reason diabetes mellitus type 1     Diagnosed at the age of 22     Quality not controlled       Severity is high     Timing constant        Macrovascular complications---TIA      Microvascular complications ---neuropathy, no DR      Left great toe amputation         Renal stones --- on Flomax         Current diabetes regimen ---insulin               Current glucose monitoring      Dexcom G6      See below          Objective   Vital Signs:   BP 92/72   Pulse 82   Ht 182.9 cm (72\")   Wt 78 kg (171 lb 14.4 oz)   SpO2 95%   BMI 23.31 kg/m²     Physical Exam   Result Review :   The following data was reviewed by: ELANA Jasso on 05/03/2022:  Common labs    Common Labsle 6/1/21 6/1/21 6/2/21 6/2/21 3/15/22 3/15/22 3/15/22 3/15/22 3/15/22    0551 0551 0447 0447 1422 1422 1422 1422 1422   Glucose  113 (A)  77 235 (A)       BUN  11  9 23       Creatinine  0.76  0.85 1.23       eGFR Non  Am  103  90        Sodium  139  138 140       Potassium  3.3 (A)  3.4 (A) 5.2       Chloride  106  102 105       Calcium  8.6  8.8 9.1       Total Protein     6.6       Albumin  2.80 (A)  2.90 (A) 4.10       Total Bilirubin  0.3  0.2 0.2       Alkaline Phosphatase  110  66 83       AST (SGOT)  30  17 14       ALT (SGPT)  12  10 16       WBC 10.81 (A)  8.96    7.12     Hemoglobin 10.5 (A)  11.1 (A)    13.3     Hematocrit 30.7 (A)  32.4 (A)    40.6     Platelets 147  173    174     Total Cholesterol      169      Triglycerides      97      HDL Cholesterol      57      LDL Cholesterol       94      Hemoglobin A1C         8.80 (A)   Uric Acid        5.0    (A) Abnormal value     "   Comments are available for some flowsheets but are not being displayed.                     Assessment and Plan    Diagnoses and all orders for this visit:    1. Type 1 diabetes mellitus with hyperglycemia (HCC) (Primary)    2. Other specified hypothyroidism    3. Diabetic polyneuropathy associated with diabetes mellitus due to underlying condition (HCC)    4. Osteoporosis, unspecified osteoporosis type, unspecified pathological fracture presence    5. Smoker    6. 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  Dispense: 200 each; Refill: 11             Glycemic Management:        Type diabetes mellitus type 1      Lab Results   Component Value Date    HGBA1C 8.80 (H) 03/15/2022          Dexcom G6      Downloaded and reviewed     Dated from April 20 to May 3, 2022     Average bg 183     Time in target 51%     High 35%     Very high 13%     Low less than 1 %     Very low less than 1 %     Having hyperglycemia in the early morning --increase lantus     Having postprandial hyperglycemia with breakfast --  Increase to 5 units              Taking lantus  8 units every a.m.--increase to 9 units            Taking Novolog 4 units before breakfast and lunch     For breakfast give 5 units     Keep 4 units for lunch     Taking 5 units for supper     +     Sliding scale     2 per 50 above 150             Microvascular Complications Monitoring         Last eye exam---- April 2022,  no DR               Neuropathy --yes                  Lipid Management:      Not on statin            Blood Pressure Management:        Thyroid Health        Lab Results   Component Value Date    TSH 5.240 (H) 03/15/2022          Taking levothyroxine 150 mcg daily                       Weight Management        BMI is within normal parameters. No follow-up  required.          Preventive Care:         Smoker     Hi Colbert  reports that he has been smoking cigarettes. He has never used smokeless tobacco.. I have educated him on the risk of diseases from using tobacco products such as cancer, COPD and heart disease.     I advised him to quit and he is not willing to quit.    I spent 3  minutes counseling the patient.                   bone Health       bone density      Osteoporosis      Based on FRAX score he has osteoporosis 4.6      Taking calcium + d     Prolia every 6 months                         Follow Up   No follow-ups on file.  Patient was given instructions and counseling regarding his condition or for health maintenance advice. Please see specific information pulled into the AVS if appropriate.         This document has been electronically signed by ELANA Jasso on May 3, 2022 16:10 CDT.

## 2022-05-05 ENCOUNTER — TELEPHONE (OUTPATIENT)
Dept: INTERNAL MEDICINE | Facility: CLINIC | Age: 67
End: 2022-05-05

## 2022-05-05 ENCOUNTER — TELEPHONE (OUTPATIENT)
Dept: ENDOCRINOLOGY | Facility: CLINIC | Age: 67
End: 2022-05-05

## 2022-05-05 NOTE — TELEPHONE ENCOUNTER
Caller: EUGENIE MUNGUIA    Relationship: Emergency Contact    Best call back number: [phone]     What form or medical record are you requesting: REFERRAL NEEDS TO BE SENT TO [facility] AT [facility]    Who is requesting this form or medical record from you: PATIENT'S BROTHER    Timeframe paperwork needed: ASAP

## 2022-05-14 ENCOUNTER — NURSE TRIAGE (OUTPATIENT)
Dept: CALL CENTER | Facility: HOSPITAL | Age: 67
End: 2022-05-14

## 2022-05-14 NOTE — TELEPHONE ENCOUNTER
"Advised caller he can check lab values for this pt on MyChart. Or call office back on Monday. Caller agrees to follow care advice.     Reason for Disposition  • Health Information question, no triage required and triager able to answer question    Additional Information  • Negative: [1] Caller is not with the adult (patient) AND [2] reporting urgent symptoms  • Negative: Lab result questions  • Negative: Medication questions  • Negative: Caller can't be reached by phone  • Negative: Caller has already spoken to PCP or another triager  • Negative: RN needs further essential information from caller in order to complete triage  • Negative: Requesting regular office appointment  • Negative: [1] Caller requesting NON-URGENT health information AND [2] PCP's office is the best resource    Answer Assessment - Initial Assessment Questions  1. REASON FOR CALL or QUESTION: \"What is your reason for calling today?\" or \"How can I best help you?\" or \"What question do you have that I can help answer?\"      Asking for lab results for pt.    Protocols used: INFORMATION ONLY CALL - NO TRIAGE-ADULT-      "

## 2022-06-16 ENCOUNTER — TELEPHONE (OUTPATIENT)
Dept: OTHER | Age: 67
End: 2022-06-16

## 2022-06-20 ENCOUNTER — TELEPHONE (OUTPATIENT)
Dept: INTERNAL MEDICINE | Facility: CLINIC | Age: 67
End: 2022-06-20

## 2022-06-20 ENCOUNTER — TELEPHONE (OUTPATIENT)
Dept: UROLOGY | Facility: CLINIC | Age: 67
End: 2022-06-20

## 2022-06-20 DIAGNOSIS — N20.0 KIDNEY STONES: Primary | ICD-10-CM

## 2022-06-20 NOTE — TELEPHONE ENCOUNTER
Caller: EUGENIE MUNGUIA    Relationship: Emergency Contact    Best call back number: [phone]      What was the call regarding: STATES A REQUEST FOR A DEXCOM WAS SENT TO OFFICE-IT WAS SENT BACK BUT MISSING INFO-WOULD LIKE TO FOLLOW UP TO SEE IF OFFICE HAS RECEIVED THE PAPERWORK  TODAY-TO RESUBMIT-WOULD NEED TO INCLUDE OFFICE NOTES FROM MOST RECENT VISIT. PLEASE ADVISE.    Do you require a callback: YES

## 2022-06-20 NOTE — TELEPHONE ENCOUNTER
Provider: FRANKLIN JOHNSTON  Caller: EUGENIE MUNGUIA  Relationship to Patient: BROTHER    Phone Number: [phone]  Reason for Call: EUGENIE WAS CALLING TO SEE IF PT NEEDS URINE SAMPLE PRIOR TO HIS APPT NEXT WEEK. NO NOTES FOUND REGARDING THAT. LAST APPT NOTE DOES SAY PT NEEDS KUB BUT NO ORDER SEEN, PLEASE ENTER ORDER FOR KUB AND ADVISE IF PT NEEDS URINE SAMPLE PRIOR TO APPT  When was the patient last seen: 12-15-21

## 2022-06-24 ENCOUNTER — TELEPHONE (OUTPATIENT)
Dept: UROLOGY | Facility: CLINIC | Age: 67
End: 2022-06-24

## 2022-06-24 NOTE — PROGRESS NOTES
Subjective    Mr. Colbert is 66 y.o. male    Chief Complaint: 6 month follow up for Kidney stones.     History of Present Illness  Patient is a 66-year-old gentleman who presents for follow-up he does have history of kidney stones he has had no stone episodes since he was last seen.  He is also on tamsulosin for his incomplete bladder emptying subjectively he states he feels like he is emptying well.  He denies any urinary tract infection since he was last seen.  Patient got a KUB prior to his appointment today.    The following portions of the patient's history were reviewed and updated as appropriate: allergies, current medications, past family history, past medical history, past social history, past surgical history and problem list.    Review of Systems      Current Outpatient Medications:   •  Continuous Blood Gluc  (Dexcom G6 ) device, 1 each Take As Directed., Disp: 1 each, Rfl: 0  •  Continuous Blood Gluc Sensor (Dexcom G6 Sensor), Every 10 (Ten) Days., Disp: 9 each, Rfl: 3  •  Continuous Blood Gluc Transmit (Dexcom G6 Transmitter) misc, 1 each Take As Directed., Disp: 1 each, Rfl: 0  •  Glucagon, rDNA, (Glucagon Emergency) 1 MG kit, Inject 1 mg as directed As Needed (AS NEEDED FOR LOW SUGAR)., Disp: , Rfl:   •  ibuprofen (ADVIL,MOTRIN) 200 MG tablet, Take 3 tablets by mouth Every 6 (Six) Hours As Needed for Moderate Pain ., Disp: 500 tablet, Rfl: 3  •  insulin aspart (NovoLOG FlexPen) 100 UNIT/ML solution pen-injector sc pen, With meals sliding scale:151-200-2 UNITS 201-250 -4 UNITS 251-300-6 UNITS 301-350-8 UNITS 351-400-10 UNITS 401 +-12 UNITS, Disp: 4 pen, Rfl: 0  •  Insulin Glargine (Lantus SoloStar) 100 UNIT/ML injection pen, Inject 9 Units under the skin into the appropriate area as directed Every Night., Disp: 3 pen, Rfl: 0  •  Insulin Pen Needle (BD Pen Needle Stephanie U/F) 32G X 4 MM misc, 1 each by Other route 4 (Four) Times a Day., Disp: 200 each, Rfl: 11  •  levothyroxine (SYNTHROID,  "LEVOTHROID) 175 MCG tablet, Take 1 tablet by mouth Daily., Disp: 30 tablet, Rfl: 2  •  potassium chloride (KLOR-CON) 20 MEQ CR tablet, Take 1 tablet by mouth Daily., Disp: 90 tablet, Rfl: 3  •  tamsulosin (FLOMAX) 0.4 MG capsule 24 hr capsule, Take 1 capsule by mouth Every Night., Disp: 90 capsule, Rfl: 3  •  vitamin D (ERGOCALCIFEROL) 1.25 MG (61034 UT) capsule capsule, Take 1 capsule by mouth Every 7 (Seven) Days., Disp: 12 capsule, Rfl: 0  •  denosumab (PROLIA) 60 MG/ML solution prefilled syringe syringe, Inject 1 mL under the skin into the appropriate area as directed 1 (One) Time for 1 dose., Disp: 1 mL, Rfl: 0    Past Medical History:   Diagnosis Date   • Cataract    • Diabetes mellitus (HCC)    • Falls    • Gait abnormality    • GERD (gastroesophageal reflux disease)    • Hypothyroidism    • Muscle spasm    • Nicotine dependence    • Rhabdomyolysis    • Tremor        Past Surgical History:   Procedure Laterality Date   • OTHER SURGICAL HISTORY      cyst on tailbone, left big toe       Social History     Socioeconomic History   • Marital status: Single   Tobacco Use   • Smoking status: Current Every Day Smoker     Packs/day: 0.50     Types: Cigarettes   • Smokeless tobacco: Never Used   • Tobacco comment: 16-17 cigs a day per pt as of 07/22/2021   Substance and Sexual Activity   • Alcohol use: Defer   • Drug use: Defer   • Sexual activity: Defer       Family History   Problem Relation Age of Onset   • No Known Problems Mother    • Alzheimer's disease Father        Objective    Temp 98.1 °F (36.7 °C)   Ht 182.9 cm (72\")   Wt 77.9 kg (171 lb 12.8 oz)   BMI 23.30 kg/m²     Physical Exam             Bladder Scan interpretation  Estimation of residual urine via abdominal ultrasound  Residual Urine: 173ml  Indication: kidney stones  Position: Supine  Examination: Incremental scanning of the suprapubic area using 3 MHz transducer using copious amounts of acoustic gel.   Findings: An anechoic area was demonstrated " which represented the bladder, with measurement of residual urine as noted. I inspected this myself. In that the residual urine was stable or insignificant, no treatment will be necessary at this time.     KUB independent review    A KUB is available for me to review today.  The image is inspected for a bowel gas pattern and the general bone structure of the spine and pelvis. The kidneys are then inspected closely.  Renal outline is noted if identifiable. The kidney, collecting system, and anticipated path of the ureter are examined for calcifications including those in the true pelvis.  This film reveals:    On the right there are no calcificaitons seen in the kidney or the expected course of the ureter. .    On the left there are no calcificaitons seen in the kidney or the expected course of the ureter.    Assessment and Plan    Diagnoses and all orders for this visit:    1. Kidney stones (Primary)  -     Cancel: POC Urinalysis Dipstick, Multipro  -     XR Abdomen KUB; Future    2. Incomplete bladder emptying    Regarding his incomplete bladder emptying subjectively he feels like he is voiding well his bladder scan was 173 which is improved from previous we will continue Flomax as directed.    Regarding his KUB there are no obvious calcifications seen.  Follow-up 1 year KUB prior.

## 2022-06-24 NOTE — TELEPHONE ENCOUNTER
LMVM with Patient to remind patient to get imaging one hour prior to their appointment. Patient voiced understanding.

## 2022-06-27 ENCOUNTER — OFFICE VISIT (OUTPATIENT)
Dept: UROLOGY | Facility: CLINIC | Age: 67
End: 2022-06-27

## 2022-06-27 ENCOUNTER — HOSPITAL ENCOUNTER (OUTPATIENT)
Dept: GENERAL RADIOLOGY | Facility: HOSPITAL | Age: 67
Discharge: HOME | End: 2022-06-27
Admitting: PHYSICIAN ASSISTANT

## 2022-06-27 VITALS — BODY MASS INDEX: 23.27 KG/M2 | WEIGHT: 171.8 LBS | TEMPERATURE: 98.1 F | HEIGHT: 72 IN

## 2022-06-27 DIAGNOSIS — N20.0 KIDNEY STONES: Primary | ICD-10-CM

## 2022-06-27 DIAGNOSIS — R33.9 INCOMPLETE BLADDER EMPTYING: ICD-10-CM

## 2022-06-27 PROCEDURE — 81001 URINALYSIS AUTO W/SCOPE: CPT | Performed by: PHYSICIAN ASSISTANT

## 2022-06-27 PROCEDURE — 99213 OFFICE O/P EST LOW 20 MIN: CPT | Performed by: PHYSICIAN ASSISTANT

## 2022-06-27 PROCEDURE — 74018 RADEX ABDOMEN 1 VIEW: CPT

## 2022-06-27 PROCEDURE — 51798 US URINE CAPACITY MEASURE: CPT | Performed by: PHYSICIAN ASSISTANT

## 2022-07-25 RX ORDER — POTASSIUM CHLORIDE 20 MEQ/1
TABLET, EXTENDED RELEASE ORAL
Qty: 90 TABLET | Refills: 0 | Status: SHIPPED | OUTPATIENT
Start: 2022-07-25 | End: 2022-10-25

## 2022-08-02 DIAGNOSIS — R35.0 URINE FREQUENCY: ICD-10-CM

## 2022-08-02 RX ORDER — TAMSULOSIN HYDROCHLORIDE 0.4 MG/1
1 CAPSULE ORAL NIGHTLY
Qty: 90 CAPSULE | Refills: 3 | Status: SHIPPED | OUTPATIENT
Start: 2022-08-02

## 2022-08-15 ENCOUNTER — OFFICE VISIT (OUTPATIENT)
Dept: ENDOCRINOLOGY | Facility: CLINIC | Age: 67
End: 2022-08-15

## 2022-08-15 ENCOUNTER — LAB (OUTPATIENT)
Dept: LAB | Facility: HOSPITAL | Age: 67
End: 2022-08-15

## 2022-08-15 VITALS
WEIGHT: 171 LBS | HEIGHT: 72 IN | DIASTOLIC BLOOD PRESSURE: 60 MMHG | BODY MASS INDEX: 23.16 KG/M2 | OXYGEN SATURATION: 96 % | HEART RATE: 75 BPM | SYSTOLIC BLOOD PRESSURE: 100 MMHG

## 2022-08-15 DIAGNOSIS — E03.9 ACQUIRED HYPOTHYROIDISM: ICD-10-CM

## 2022-08-15 DIAGNOSIS — F17.200 SMOKER: ICD-10-CM

## 2022-08-15 DIAGNOSIS — E10.3393 TYPE 1 DIABETES MELLITUS WITH MODERATE NONPROLIFERATIVE RETINOPATHY OF BOTH EYES WITHOUT MACULAR EDEMA: Primary | ICD-10-CM

## 2022-08-15 DIAGNOSIS — E10.42 DIABETIC POLYNEUROPATHY ASSOCIATED WITH TYPE 1 DIABETES MELLITUS: ICD-10-CM

## 2022-08-15 LAB
ALBUMIN SERPL-MCNC: 3.8 G/DL (ref 3.5–5.2)
ALBUMIN/GLOB SERPL: 1.3 G/DL
ALP SERPL-CCNC: 83 U/L (ref 39–117)
ALT SERPL W P-5'-P-CCNC: 15 U/L (ref 1–41)
ANION GAP SERPL CALCULATED.3IONS-SCNC: 7 MMOL/L (ref 5–15)
AST SERPL-CCNC: 18 U/L (ref 1–40)
BASOPHILS # BLD AUTO: 0.05 10*3/MM3 (ref 0–0.2)
BASOPHILS NFR BLD AUTO: 0.8 % (ref 0–1.5)
BILIRUB SERPL-MCNC: 0.2 MG/DL (ref 0–1.2)
BUN SERPL-MCNC: 25 MG/DL (ref 8–23)
BUN/CREAT SERPL: 25.3 (ref 7–25)
CALCIUM SPEC-SCNC: 8.9 MG/DL (ref 8.6–10.5)
CHLORIDE SERPL-SCNC: 105 MMOL/L (ref 98–107)
CO2 SERPL-SCNC: 26 MMOL/L (ref 22–29)
CREAT SERPL-MCNC: 0.99 MG/DL (ref 0.76–1.27)
DEPRECATED RDW RBC AUTO: 40.4 FL (ref 37–54)
EGFRCR SERPLBLD CKD-EPI 2021: 83.5 ML/MIN/1.73
EOSINOPHIL # BLD AUTO: 0.25 10*3/MM3 (ref 0–0.4)
EOSINOPHIL NFR BLD AUTO: 3.9 % (ref 0.3–6.2)
ERYTHROCYTE [DISTWIDTH] IN BLOOD BY AUTOMATED COUNT: 12.3 % (ref 12.3–15.4)
GLOBULIN UR ELPH-MCNC: 3 GM/DL
GLUCOSE SERPL-MCNC: 222 MG/DL (ref 65–99)
HCT VFR BLD AUTO: 37.6 % (ref 37.5–51)
HGB BLD-MCNC: 12.9 G/DL (ref 13–17.7)
IMM GRANULOCYTES # BLD AUTO: 0.02 10*3/MM3 (ref 0–0.05)
IMM GRANULOCYTES NFR BLD AUTO: 0.3 % (ref 0–0.5)
LYMPHOCYTES # BLD AUTO: 1.69 10*3/MM3 (ref 0.7–3.1)
LYMPHOCYTES NFR BLD AUTO: 26.3 % (ref 19.6–45.3)
MCH RBC QN AUTO: 30.8 PG (ref 26.6–33)
MCHC RBC AUTO-ENTMCNC: 34.3 G/DL (ref 31.5–35.7)
MCV RBC AUTO: 89.7 FL (ref 79–97)
MONOCYTES # BLD AUTO: 0.68 10*3/MM3 (ref 0.1–0.9)
MONOCYTES NFR BLD AUTO: 10.6 % (ref 5–12)
NEUTROPHILS NFR BLD AUTO: 3.73 10*3/MM3 (ref 1.7–7)
NEUTROPHILS NFR BLD AUTO: 58.1 % (ref 42.7–76)
NRBC BLD AUTO-RTO: 0 /100 WBC (ref 0–0.2)
PLATELET # BLD AUTO: 172 10*3/MM3 (ref 140–450)
PMV BLD AUTO: 10 FL (ref 6–12)
POTASSIUM SERPL-SCNC: 5 MMOL/L (ref 3.5–5.2)
PROT SERPL-MCNC: 6.8 G/DL (ref 6–8.5)
RBC # BLD AUTO: 4.19 10*6/MM3 (ref 4.14–5.8)
SODIUM SERPL-SCNC: 138 MMOL/L (ref 136–145)
TSH SERPL DL<=0.05 MIU/L-ACNC: 0.56 UIU/ML (ref 0.27–4.2)
WBC NRBC COR # BLD: 6.42 10*3/MM3 (ref 3.4–10.8)

## 2022-08-15 PROCEDURE — 85025 COMPLETE CBC W/AUTO DIFF WBC: CPT | Performed by: NURSE PRACTITIONER

## 2022-08-15 PROCEDURE — 80053 COMPREHEN METABOLIC PANEL: CPT | Performed by: NURSE PRACTITIONER

## 2022-08-15 PROCEDURE — 36415 COLL VENOUS BLD VENIPUNCTURE: CPT | Performed by: NURSE PRACTITIONER

## 2022-08-15 PROCEDURE — 95251 CONT GLUC MNTR ANALYSIS I&R: CPT | Performed by: NURSE PRACTITIONER

## 2022-08-15 PROCEDURE — 99214 OFFICE O/P EST MOD 30 MIN: CPT | Performed by: NURSE PRACTITIONER

## 2022-08-15 PROCEDURE — 84443 ASSAY THYROID STIM HORMONE: CPT | Performed by: NURSE PRACTITIONER

## 2022-08-15 PROCEDURE — 83036 HEMOGLOBIN GLYCOSYLATED A1C: CPT | Performed by: NURSE PRACTITIONER

## 2022-08-15 RX ORDER — INSULIN GLARGINE 100 [IU]/ML
10 INJECTION, SOLUTION SUBCUTANEOUS NIGHTLY
Qty: 3 PEN | Refills: 11 | Status: SHIPPED | OUTPATIENT
Start: 2022-08-15

## 2022-08-15 RX ORDER — INSULIN ASPART 100 [IU]/ML
INJECTION, SOLUTION INTRAVENOUS; SUBCUTANEOUS
Qty: 4 PEN | Refills: 11 | Status: SHIPPED | OUTPATIENT
Start: 2022-08-15

## 2022-08-15 NOTE — PROGRESS NOTES
"Chief Complaint  Diabetes    Subjective          Hi Colbert presents to [facility] ENDOCRINOLOGY  History of Present Illness        In office visit     66 year old male presents for follow up      Reason diabetes mellitus type 1     Diagnosed at the age of 22     Quality not controlled       Severity is high     Timing constant        Macrovascular complications---TIA      Microvascular complications ---neuropathy, no DR      Left great toe amputation         Renal stones --- on Flomax         Current diabetes regimen ---insulin               Current glucose monitoring      Dexcom G6      See below          Objective   Vital Signs:   /60   Pulse 75   Ht 182.9 cm (72\")   Wt 77.6 kg (171 lb)   SpO2 96%   BMI 23.19 kg/m²     Physical Exam  Constitutional:       Appearance: Normal appearance.   Cardiovascular:      Rate and Rhythm: Regular rhythm.      Heart sounds: Normal heart sounds.   Pulmonary:      Breath sounds: Normal breath sounds.   Musculoskeletal:      Cervical back: Normal range of motion.   Neurological:      Mental Status: He is alert.        Result Review :   The following data was reviewed by: ELANA Jasso on 05/03/2022:  Common labs    Common Labsle 3/15/22 3/15/22 3/15/22 3/15/22 3/15/22    1422 1422 1422 1422 1422   Glucose 235 (A)       BUN 23       Creatinine 1.23       Sodium 140       Potassium 5.2       Chloride 105       Calcium 9.1       Total Protein 6.6       Albumin 4.10       Total Bilirubin 0.2       Alkaline Phosphatase 83       AST (SGOT) 14       ALT (SGPT) 16       WBC   7.12     Hemoglobin   13.3     Hematocrit   40.6     Platelets   174     Total Cholesterol  169      Triglycerides  97      HDL Cholesterol  57      LDL Cholesterol   94      Hemoglobin A1C     8.80 (A)   Uric Acid    5.0    (A) Abnormal value       Comments are available for some flowsheets but are not being displayed.                       Assessment and " Plan    Diagnoses and all orders for this visit:    1. 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  Dispense: 3 pen; Refill: 11  -     insulin aspart (NovoLOG FlexPen) 100 UNIT/ML solution pen-injector sc pen; 4 up to 6 untis plus meals sliding scale:151-200-2 UNITS 201-250 -4 UNITS 251-300-6 UNITS 301-350-8 UNITS 351-400-10 UNITS 401 +-12 UNITS  Dispense: 4 pen; Refill: 11    2. Acquired hypothyroidism  -     CBC & Differential  -     Comprehensive Metabolic Panel  -     Hemoglobin A1c  -     TSH    3. Smoker    4. Diabetic polyneuropathy associated with type 1 diabetes mellitus (HCC)             Glycemic Management:        Type diabetes mellitus type 1      Lab Results   Component Value Date    HGBA1C 8.80 (H) 03/15/2022          Dexcom G6      Downloaded and reviewed     Dated from August 1 to August 14, 2022    Average bg 177    Time in target 51%     High 37%     Very high 10%     Low 1%     Very low less than 1 %     Hyperglycemia over night       Daytime is at goal            Taking lantus 9 units --- increase to 10 units            Taking Novolog    For breakfast give 5 units     Keep 4 units for lunch     Taking 5 units for supper     +     Sliding scale     2 per 50 above 150             Microvascular Complications Monitoring         Last eye exam---- April 2022,  no DR               Neuropathy --yes                  Lipid Management:      Not on statin            Blood Pressure Management:        Thyroid Health        Lab Results   Component Value Date    TSH 0.549 05/12/2022          Taking levothyroxine 175  mcg daily                       Weight Management      Not overweight     Body  mass index is 23.19 kg/m².            Preventive Care:         Smoker     Hi Colbert  reports that he has been smoking cigarettes. He has been smoking about 0.50 packs per day. He has never used smokeless tobacco.. I have educated him on the risk of diseases from using tobacco products such as cancer, COPD and heart disease.     I advised him to quit and he is not willing to quit.    I spent 3  minutes counseling the patient.                  Bone  Health       bone density      Osteoporosis      Based on FRAX score he has osteoporosis 4.6      Taking calcium + d     Prolia every 6 months           next DEXA July 2023               Follow Up   Return in about 3 months (around 11/15/2022) for Recheck.  Patient was given instructions and counseling regarding his condition or for health maintenance advice. Please see specific information pulled into the AVS if appropriate.         This document has been electronically signed by ELANA Jasso on August 15, 2022 16:32 CDT.

## 2022-08-15 NOTE — PATIENT INSTRUCTIONS
Insulin Pen Needle (BD Pen Needle Stephanie U/F) 32G X 4 MM misc 11 ordered        Dose, Route, Frequency: 1 each, Other, 4 Times Daily    Ordered on: 5/3/2022         Summary: 1 each by Other route 4 (Four) Times a Day., Starting Tue 5/3/2022, Normal       Dose, Frequency: 1 each, 4 Times Daily    Start: 5/3/2022    Ord/Sold: 5/3/2022 (O)    Pharmacy: [address] - [phone] [facility] [phone] FX      Report    Adh:     Long-term:       Med Dose History         Patient Si each by Other route 4 (Four) Times a Day.       Authorized by: PETER HYATT       Dispense: 200 each      Urine frequency

## 2022-08-16 LAB — HBA1C MFR BLD: 8.5 % (ref 4.8–5.6)

## 2022-08-17 DIAGNOSIS — E03.9 ACQUIRED HYPOTHYROIDISM: ICD-10-CM

## 2022-08-17 RX ORDER — ERGOCALCIFEROL 1.25 MG/1
50000 CAPSULE ORAL
Qty: 12 CAPSULE | Refills: 1 | Status: SHIPPED | OUTPATIENT
Start: 2022-08-17

## 2022-08-17 RX ORDER — LEVOTHYROXINE SODIUM 175 UG/1
175 TABLET ORAL DAILY
Qty: 90 TABLET | Refills: 1 | Status: SHIPPED | OUTPATIENT
Start: 2022-08-17 | End: 2023-01-23

## 2022-09-07 ENCOUNTER — OFFICE VISIT (OUTPATIENT)
Dept: INTERNAL MEDICINE | Facility: CLINIC | Age: 67
End: 2022-09-07

## 2022-09-07 VITALS
DIASTOLIC BLOOD PRESSURE: 66 MMHG | SYSTOLIC BLOOD PRESSURE: 118 MMHG | OXYGEN SATURATION: 99 % | HEIGHT: 72 IN | WEIGHT: 172 LBS | TEMPERATURE: 97.1 F | HEART RATE: 81 BPM | BODY MASS INDEX: 23.3 KG/M2

## 2022-09-07 DIAGNOSIS — I69.30 HISTORY OF STROKE WITH RESIDUAL DEFICIT: ICD-10-CM

## 2022-09-07 DIAGNOSIS — E10.65 TYPE 1 DIABETES MELLITUS WITH HYPERGLYCEMIA: Primary | ICD-10-CM

## 2022-09-07 PROCEDURE — 99213 OFFICE O/P EST LOW 20 MIN: CPT | Performed by: NURSE PRACTITIONER

## 2022-09-07 RX ORDER — ASCORBATE CALCIUM/BIOFLAVONOID 1000-200MG
2 TABLET, EXTENDED RELEASE ORAL DAILY
Start: 2022-09-07 | End: 2023-03-27

## 2022-09-07 RX ORDER — ASPIRIN 81 MG/1
81 TABLET ORAL DAILY
Start: 2022-09-07 | End: 2022-11-17 | Stop reason: SDUPTHER

## 2022-09-07 NOTE — PROGRESS NOTES
Subjective     Chief Complaint:  Sores on heels    HPI:  Patient presents to the office today for 6-month follow-up regarding diabetes mellitus type 1.  He is also followed by endocrinology routinely every 3 months.  During his last office visit on 8/15, his Lantus was increased from 9 units to 10 units.  He feels he is doing well with this, has not noted any fluctuations in his blood glucose or hypoglycemic episodes.  He does use a Dexcom device.  Currently, his devices undergoing an update so I am unable to download or see any readings at this time.  The patient does complain of some wounds on the backs of his heels/ankles which he believes is from his recliner.  He has been trying to rub lotion on his feet.  He has not noted any drainage from these wounds.    The patient does have a history of stroke he believes 2 years ago.  He has residual left leg weakness and weakness of his left hand .  He does not take a baby aspirin daily, nor does he take cholesterol medication.  He denies any difficulty with bleeding.    Patient's PMR from outside medical facility reviewed and noted.    Past Medical History:   Past Medical History:   Diagnosis Date   • Cataract    • Diabetes mellitus (HCC)    • Falls    • Gait abnormality    • GERD (gastroesophageal reflux disease)    • Hypothyroidism    • Muscle spasm    • Nicotine dependence    • Rhabdomyolysis    • Tremor      Past Surgical History:  Past Surgical History:   Procedure Laterality Date   • OTHER SURGICAL HISTORY      cyst on tailbone, left big toe     Social History:  reports that he has been smoking cigarettes. He has been smoking about 0.50 packs per day. He has never used smokeless tobacco. Alcohol use questions deferred to the physician. Drug use questions deferred to the physician.    Family History: family history includes Alzheimer's disease in his father; No Known Problems in his mother.      Allergies:  No Known Allergies  Medications:  Prior to  Admission medications    Medication Sig Start Date End Date Taking? Authorizing Provider   Continuous Blood Gluc  (Dexcom G6 ) device 1 each Take As Directed. 2/28/22  Yes Kesha Del Cid APRN   Continuous Blood Gluc Sensor (Dexcom G6 Sensor) Every 10 (Ten) Days. 7/23/21  Yes Raissa Olivera APRN   Continuous Blood Gluc Transmit (Dexcom G6 Transmitter) misc 1 each Take As Directed. 2/28/22  Yes Kesha Del Cid APRN   Glucagon, rDNA, (Glucagon Emergency) 1 MG kit Inject 1 mg as directed As Needed (AS NEEDED FOR LOW SUGAR). 7/23/20  Yes Provider, MD Deanna   ibuprofen (ADVIL,MOTRIN) 200 MG tablet Take 3 tablets by mouth Every 6 (Six) Hours As Needed for Moderate Pain . 7/23/21  Yes Raissa Olivera APRN   insulin aspart (NovoLOG FlexPen) 100 UNIT/ML solution pen-injector sc pen 4 up to 6 untis plus meals sliding scale:151-200-2 UNITS 201-250 -4 UNITS 251-300-6 UNITS 301-350-8 UNITS 351-400-10 UNITS 401 +-12 UNITS 8/15/22  Yes Tee Gonsales APRN   Insulin Glargine (Lantus SoloStar) 100 UNIT/ML injection pen Inject 10 Units under the skin into the appropriate area as directed Every Night. 8/15/22  Yes Tee Gonsales APRN   Insulin Pen Needle (BD Pen Needle Stephanie U/F) 32G X 4 MM misc 1 each by Other route 4 (Four) Times a Day. 5/3/22  Yes Tee Gonsales APRN   levothyroxine (SYNTHROID, LEVOTHROID) 175 MCG tablet Take 1 tablet by mouth Daily. 8/17/22  Yes Diana Luz APRN   potassium chloride (K-DUR,KLOR-CON) 20 MEQ CR tablet TAKE ONE TABLET BY MOUTH EVERY DAY 7/25/22  Yes Diana Luz APRN   tamsulosin (FLOMAX) 0.4 MG capsule 24 hr capsule Take 1 capsule by mouth Every Night. 8/2/22  Yes Mendoza Alberts PA   vitamin D (ERGOCALCIFEROL) 1.25 MG (41406 UT) capsule capsule Take 1 capsule by mouth Every 7 (Seven) Days. 8/17/22  Yes Diana Luz APRN   aspirin (aspirin) 81 MG EC tablet Take 1 tablet by mouth Daily. 9/7/22   Sukh,  "ELANA Burgos   denosumab (PROLIA) 60 MG/ML solution prefilled syringe syringe Inject 1 mL under the skin into the appropriate area as directed 1 (One) Time for 1 dose. 8/13/21 8/13/21  Kesha Del Cid APRN   Multiple Minerals (Calcium-Magnesium-Zinc) tablet Take 2 tablets by mouth Daily. 9/7/22   Diana Luz APRN       Objective     Vital Signs: /66 (BP Location: Left arm, Patient Position: Sitting, Cuff Size: Adult)   Pulse 81   Temp 97.1 °F (36.2 °C) (Temporal)   Ht 182.9 cm (72\")   Wt 78 kg (172 lb)   SpO2 99%   BMI 23.33 kg/m²   Physical Exam  Vitals and nursing note reviewed.   Constitutional:       General: He is not in acute distress.     Appearance: He is not ill-appearing or toxic-appearing.   HENT:      Head: Normocephalic and atraumatic.      Mouth/Throat:      Mouth: Mucous membranes are moist.      Pharynx: Oropharynx is clear.   Cardiovascular:      Rate and Rhythm: Normal rate and regular rhythm.      Pulses: Normal pulses.      Heart sounds: Normal heart sounds.   Pulmonary:      Effort: Pulmonary effort is normal.      Breath sounds: No wheezing, rhonchi or rales.   Abdominal:      General: Bowel sounds are normal. There is no distension.      Palpations: Abdomen is soft.      Tenderness: There is no abdominal tenderness.   Musculoskeletal:         General: No swelling or tenderness. Normal range of motion.      Cervical back: Normal range of motion and neck supple. No tenderness.   Skin:     General: Skin is warm and dry.      Findings: Lesion (scabs noted to posterior achilles areas bilaterally. No open skin or drainage noted.) present. No erythema or rash.   Neurological:      General: No focal deficit present.      Mental Status: He is alert and oriented to person, place, and time.      Motor: Weakness (left hand  weak. Left lower extremity weakness baseline) present.   Psychiatric:         Mood and Affect: Mood normal.         Behavior: Behavior normal.  "        Thought Content: Thought content normal.         Judgment: Judgment normal.       BMI is within normal parameters. No other follow-up for BMI required.    Results Reviewed:  Reviewed patient's last primary care office visit note from 3/15/2022.  Also reviewed last endocrinology office visit note from 8/15/2022.    Assessment / Plan     Assessment/Plan:  Diagnoses and all orders for this visit:    1. Type 1 diabetes mellitus with hyperglycemia (HCC) (Primary)    2. History of stroke with residual deficit    Other orders  -     Multiple Minerals (Calcium-Magnesium-Zinc) tablet; Take 2 tablets by mouth Daily.  -     aspirin (aspirin) 81 MG EC tablet; Take 1 tablet by mouth Daily.       Patient presents for 6-month follow-up regarding diabetes mellitus management.  He is also routinely followed by endocrinology every 3 months.  He does use Lantus and NovoLog pens.  He monitors blood glucoses with a Dexcom device and is doing well with this.  His last hemoglobin A1c on 8/15 was 8.5%, previously was 8.8%.  For the wounds on his bilateral Achilles area, advised him to try to wear shoes without backs, which he does have.  Also advised to use triple antibiotic ointment to this area.  No evidence of infection at this time, patient to call for any worsening of wounds.    The patient does have a history of CVA 2 years ago.  He tells me that he does not see a neurologist.  He is unsure when the last time he took aspirin was, but believes it may have been prior to the stroke.  He also does not believe he has ever been maintained on cholesterol medication.  His last LDL noted in March was 94.  The patient denies any difficulty with bleeding at this time.  His CBC noted from 8/15/2022 showed a normal platelet count.  He is willing to resume a baby aspirin daily for further stroke prevention.  We will reassess his cholesterol panel in 3 months time and at that point he may be agreeable to starting cholesterol medication, at  this point he is unsure.    Of note, the patient's brother in attendance did asked me to place a calcium/magnesium/zinc tablet on the patient's medication list as he has been taking this over-the-counter, in order to comply with the medication regulations for his assisted living facility.    Return in about 3 months (around 12/7/2022). unless patient needs to be seen sooner or acute issues arise.    I have discussed the patient results/orders and and plan/recommendation with them at today's visit.      Diana Luz, ELANA   09/07/2022

## 2022-10-14 NOTE — PLAN OF CARE
"Goal Outcome Evaluation:        Outcome Summary: pt adamently refused OOB, this is so stupid im not gettng up until i can get dressed and get all this stuff disconnected and off of me! explained importance of activity but pt reports he didn't care right now he had 3 therapist where he was going and will be fine!\" Nsg notified and came to encourage pt OOB pt begrudgingly agree was able to get pt to EOB and then pt reports once again this is so stupid IM NOT GETTING UP!!! and he proceeded to lay back down. cont to encourage activity and pt was not intetested!  "
Goal Outcome Evaluation:         Oriented to self and time not place or situation.  Wakes up and yells for help.  Reorientation to place and situation helpful.  Abx given as ordered.  Heels elevated.  Sanchez in place and FC given q 4.  Incontinent of bowel.  No C/O pain.  Lt sided weakness baseline.  N/T baseline in hands bilat.  Blood glucose monitoring with SS coverage.  RA.  Resting well tonight.  Will continue to monitor.  
Goal Outcome Evaluation:         Oriented to self and year not place or situation.  Blood glucose monitoring.  SS used for coverage.  Low BS of 58 around 0240.  Peanut butter and jarret crackers given.  BS at 2:55 63. And 76 at 0315.  Turning.  Heels elevated.  RA  .  Tele in place.  NS 66. Abx given as ordered.  No C/O pain.  Sanchez in place and FC complete.  Will continue to monitor.  
Goal Outcome Evaluation:     Per patient request- change gustafson bag to a leg bag and given a new gustafson bag to take home. Patient states no pain at this time. Patient is eating well, SSI given as ordered. To go back to [facility] today with [facility]. DC instructions given to patient and his brother. Brother made his outpatient urology appointment/        
Goal Outcome Evaluation:  Plan of Care Reviewed With: patient  Progress: no change  Outcome Summary: PT eval completed.  Pt w/ decrease strength L hip/ankle compared to R, per pt due to stroke 2 yrs ago.  Pt w/ decrease sitting and standing balance requiring assist and use of rwx w/ noted posterior lean and increase trunk sway w/ activity in sitting.  Noted increase trunk sway, inconsistent step length w/ L step length decrease compared to R, veering from straight path and impuslive behavior w/ ambulation.  Pt w/ increase fall risk and demonstrates decrease safety awareness.  Pt will benefit from continued PT services to improve safety awareness, activity tolerance, strength, balance, and I w/ functional mobility reducing fall risk.  Recommend continued skilled care at discharge.  Pt reports he is planning to go home and thought he was going to go home earlier this day.  Reports if he has to stay here, he is not going to do anything, that he is just going to be a lump on a log.  Advised pt re: his decrease balance and increase fall risk and concerns for this.  Pt not interested in going for rehab in a facility or staying in our facility any longer.  Plans to go home this date.  Will follow for needs if pt does remain in the hospital and will continued w/ skilled training.  However if pt does return home, recommend 24/7 assist and HH.  
[[id number]]

## 2022-10-25 RX ORDER — POTASSIUM CHLORIDE 20 MEQ/1
TABLET, EXTENDED RELEASE ORAL
Qty: 90 TABLET | Refills: 3 | Status: SHIPPED | OUTPATIENT
Start: 2022-10-25 | End: 2022-12-27

## 2022-11-17 RX ORDER — ASPIRIN 81 MG/1
81 TABLET ORAL DAILY
Qty: 180 TABLET | Refills: 1
Start: 2022-11-17 | End: 2022-12-01 | Stop reason: SDUPTHER

## 2022-12-01 RX ORDER — ASPIRIN 81 MG/1
81 TABLET ORAL DAILY
Qty: 180 TABLET | Refills: 1 | Status: SHIPPED | OUTPATIENT
Start: 2022-12-01

## 2022-12-02 ENCOUNTER — LAB (OUTPATIENT)
Dept: INTERNAL MEDICINE | Facility: CLINIC | Age: 67
End: 2022-12-02

## 2022-12-02 DIAGNOSIS — Z79.899 ENCOUNTER FOR LONG-TERM (CURRENT) USE OF OTHER MEDICATIONS: ICD-10-CM

## 2022-12-02 DIAGNOSIS — E10.3393 TYPE 1 DIABETES MELLITUS WITH MODERATE NONPROLIFERATIVE RETINOPATHY OF BOTH EYES WITHOUT MACULAR EDEMA: Primary | ICD-10-CM

## 2022-12-02 DIAGNOSIS — I95.9 HYPOTENSION, UNSPECIFIED HYPOTENSION TYPE: ICD-10-CM

## 2022-12-03 ENCOUNTER — NURSE TRIAGE (OUTPATIENT)
Dept: CALL CENTER | Facility: HOSPITAL | Age: 67
End: 2022-12-03

## 2022-12-07 LAB
ALBUMIN SERPL-MCNC: 4.5 G/DL (ref 3.5–5.2)
ALBUMIN/GLOB SERPL: 2 G/DL
ALP SERPL-CCNC: 91 U/L (ref 39–117)
ALT SERPL-CCNC: 15 U/L (ref 1–41)
AST SERPL-CCNC: 14 U/L (ref 1–40)
BILIRUB SERPL-MCNC: 0.3 MG/DL (ref 0–1.2)
BUN SERPL-MCNC: 23 MG/DL (ref 8–23)
BUN/CREAT SERPL: 22.3 (ref 7–25)
CALCIUM SERPL-MCNC: 9.1 MG/DL (ref 8.6–10.5)
CHLORIDE SERPL-SCNC: 98 MMOL/L (ref 98–107)
CHOLEST SERPL-MCNC: 163 MG/DL (ref 0–200)
CO2 SERPL-SCNC: 25.7 MMOL/L (ref 22–29)
CREAT SERPL-MCNC: 1.03 MG/DL (ref 0.76–1.27)
EGFRCR SERPLBLD CKD-EPI 2021: 79.1 ML/MIN/1.73
GLOBULIN SER CALC-MCNC: 2.3 GM/DL
GLUCOSE SERPL-MCNC: 514 MG/DL (ref 65–99)
HBA1C MFR BLD: 8.7 % (ref 4.8–5.6)
HDLC SERPL-MCNC: 61 MG/DL (ref 40–60)
LDLC SERPL CALC-MCNC: 91 MG/DL (ref 0–100)
POTASSIUM SERPL-SCNC: 5.4 MMOL/L (ref 3.5–5.2)
PROT SERPL-MCNC: 6.8 G/DL (ref 6–8.5)
SODIUM SERPL-SCNC: 135 MMOL/L (ref 136–145)
TRIGL SERPL-MCNC: 51 MG/DL (ref 0–150)
VLDLC SERPL CALC-MCNC: 11 MG/DL (ref 5–40)

## 2022-12-07 NOTE — PROGRESS NOTES
Glucose was 514 on labs several days ago. Patient has dexcom device so I'm assuming he was already aware of this. Has he adjusted insulin at all? Are glucoses better? Sodium was a little low (which is likely from his glucose being so high). Potassium was a little high, so he should hold his potassium pill and we can recheck at his appt later this month.

## 2022-12-22 ENCOUNTER — TELEMEDICINE (OUTPATIENT)
Dept: ENDOCRINOLOGY | Facility: CLINIC | Age: 67
End: 2022-12-22

## 2022-12-22 DIAGNOSIS — M81.0 OSTEOPOROSIS, UNSPECIFIED OSTEOPOROSIS TYPE, UNSPECIFIED PATHOLOGICAL FRACTURE PRESENCE: ICD-10-CM

## 2022-12-22 DIAGNOSIS — E03.8 OTHER SPECIFIED HYPOTHYROIDISM: ICD-10-CM

## 2022-12-22 DIAGNOSIS — E10.649 TYPE 1 DIABETES MELLITUS WITH HYPOGLYCEMIA AND WITHOUT COMA: Primary | ICD-10-CM

## 2022-12-22 DIAGNOSIS — F17.200 SMOKER: ICD-10-CM

## 2022-12-22 PROCEDURE — 99214 OFFICE O/P EST MOD 30 MIN: CPT | Performed by: NURSE PRACTITIONER

## 2022-12-22 NOTE — PROGRESS NOTES
Chief Complaint  Diabetes    Subjective          Hi Colbert presents to [facility] ENDOCRINOLOGY  Diabetes          You have chosen to receive care through a telehealth visit.  Do you consent to use a video/audio connection for your medical care today? Yes            TELEHEALTH VIDEO VISIT     This a video visit due to [facility] current guidelines for social distancing due to the COVID 19 pandemic          Mode of Visit: Video  Location of patient: home  You have chosen to receive care through a telehealth visit.  Does the patient consent to use a video/audio connection for your medical care today? Yes  The visit included audio and video interaction. No technical issues occurred during this visit.           67 year old male presents for follow up       Reason diabetes mellitus type 1     Diagnosed at the age of 22     Quality not controlled     Severity is high     Timing constant      Macrovascular complications---TIA      Microvascular complications ---neuropathy, no DR      Left great toe amputation         Renal stones --- on Flomax         Current diabetes regimen ---insulin                Current glucose monitoring      Dexcom G6 ---he is using     Could not download     Am readings above 130       Had a low today but states did not eat all of his breakfast         Review of Systems - General ROS: negative                  Objective   Vital Signs:   There were no vitals taken for this visit.    Physical Exam  Neurological:      General: No focal deficit present.      Mental Status: He is alert.   Psychiatric:         Mood and Affect: Mood normal.         Thought Content: Thought content normal.         Judgment: Judgment normal.        Result Review :   The following data was reviewed by: ELANA Jasso on 05/03/2022:  Common labs    Common Labs 3/15/22 3/15/22 3/15/22 3/15/22 3/15/22 8/15/22 8/15/22 8/15/22 12/2/22 12/2/22 12/2/22    1422 1422 1422 1422 1422 1701 1701  1701 0658 0658 0658   Glucose 235 (A)      222 (A)    514 (A)   BUN 23      25 (A)    23   Creatinine 1.23      0.99    1.03   Sodium 140      138    135 (A)   Potassium 5.2      5.0    5.4 (A)   Chloride 105      105    98   Calcium 9.1      8.9    9.1   Total Protein 6.6          6.8   Albumin 4.10      3.80    4.50   Total Bilirubin 0.2      0.2    0.3   Alkaline Phosphatase 83      83    91   AST (SGOT) 14      18    14   ALT (SGPT) 16      15    15   WBC   7.12   6.42        Hemoglobin   13.3   12.9 (A)        Hematocrit   40.6   37.6        Platelets   174   172        Total Cholesterol  169       163     Triglycerides  97       51     HDL Cholesterol  57       61 (A)     LDL Cholesterol   94       91     Hemoglobin A1C     8.80 (A)   8.50 (A)  8.70 (A)    Uric Acid    5.0          (A) Abnormal value       Comments are available for some flowsheets but are not being displayed.                       Assessment and Plan    Diagnoses and all orders for this visit:    1. Type 1 diabetes mellitus with hypoglycemia and without coma (HCC) (Primary)    2. Other specified hypothyroidism    3. Smoker    4. Osteoporosis, unspecified osteoporosis type, unspecified pathological fracture presence             Glycemic Management:        Type diabetes mellitus type 1      Lab Results   Component Value Date    HGBA1C 8.70 (H) 12/02/2022          Dexcom G6 --he is using     Could not download            Taking lantus 10 units --cannot tolerate higher dosages              Taking Novolog    For breakfast give 5 units     Keep 4 units for lunch     Taking 5 units for supper     +     Sliding scale     2 per 50 above 150        we discussed if eating less he needs to give less insulin     And if eating more then he can add one unit              Microvascular Complications Monitoring         Last eye exam---- April 2022,  no DR               Neuropathy --yes                  Lipid Management:      Not on statin        Total  Cholesterol   Date Value Ref Range Status   05/30/2021 92 0 - 200 mg/dL Final     Triglycerides   Date Value Ref Range Status   12/02/2022 51 0 - 150 mg/dL Final   05/30/2021 87 0 - 150 mg/dL Final   10/13/2020 65 0 - 200 mg/dL Final     HDL Cholesterol   Date Value Ref Range Status   12/02/2022 61 (H) 40 - 60 mg/dL Final   05/30/2021 48 40 - 60 mg/dL Final     LDL Cholesterol    Date Value Ref Range Status   10/13/2020 86 30 - 100 mg/dL Final     LDL Chol Calc (NIH)   Date Value Ref Range Status   12/02/2022 91 0 - 100 mg/dL Final           Blood Pressure Management:        Thyroid Health        Lab Results   Component Value Date    TSH 0.562 08/15/2022          Taking levothyroxine 175  mcg daily                       Weight Management      Not overweight     There is no height or weight on file to calculate BMI.            Preventive Care:         Smoker     Hi Colbert  reports that he has been smoking cigarettes. He has been smoking an average of .5 packs per day. He has never used smokeless tobacco.. I have educated him on the risk of diseases from using tobacco products such as cancer, COPD and heart disease.     I advised him to quit and he is not willing to quit.    I spent 3  minutes counseling the patient.                  Bone  Health       bone density      Osteoporosis      Based on FRAX score he has osteoporosis 4.6      Taking calcium + d     Prolia every 6 months           next DEXA July 2023               Follow Up   No follow-ups on file.  Patient was given instructions and counseling regarding his condition or for health maintenance advice. Please see specific information pulled into the AVS if appropriate.         This document has been electronically signed by ELANA Jasso on December 22, 2022 15:05 CST.

## 2022-12-27 ENCOUNTER — OFFICE VISIT (OUTPATIENT)
Dept: INTERNAL MEDICINE | Facility: CLINIC | Age: 67
End: 2022-12-27

## 2022-12-27 VITALS
OXYGEN SATURATION: 98 % | TEMPERATURE: 96.9 F | SYSTOLIC BLOOD PRESSURE: 112 MMHG | WEIGHT: 172 LBS | BODY MASS INDEX: 23.3 KG/M2 | HEIGHT: 72 IN | DIASTOLIC BLOOD PRESSURE: 58 MMHG | HEART RATE: 75 BPM

## 2022-12-27 DIAGNOSIS — E10.649 TYPE 1 DIABETES MELLITUS WITH HYPOGLYCEMIA AND WITHOUT COMA: Primary | ICD-10-CM

## 2022-12-27 DIAGNOSIS — S91.302A OPEN WOUND OF LEFT HEEL, INITIAL ENCOUNTER: ICD-10-CM

## 2022-12-27 DIAGNOSIS — Z86.73 HISTORY OF STROKE: ICD-10-CM

## 2022-12-27 DIAGNOSIS — E87.5 HYPERKALEMIA: ICD-10-CM

## 2022-12-27 PROCEDURE — 99214 OFFICE O/P EST MOD 30 MIN: CPT | Performed by: NURSE PRACTITIONER

## 2022-12-27 RX ORDER — POTASSIUM CHLORIDE 20 MEQ/1
20 TABLET, EXTENDED RELEASE ORAL DAILY
Qty: 90 TABLET | Refills: 3
Start: 2022-12-27 | End: 2023-03-27

## 2022-12-28 NOTE — PROGRESS NOTES
Subjective     Chief Complaint:  Heel wounds    HPI:  Patient presents to the office today for 3-month follow-up.  His brother is in accompaniment with him.  Patient does live in an assisted living facility.  He indicates that for the last several weeks he has been treated for wounds to the backs of both heels, which he sustained from a piece of wood that is underneath his recliner.  He states the left heel has been treated with oral antibiotics, and antibiotic ointment and has been getting dressing changes.  He now is only requiring a Band-Aid as this seems to be healing well.  The right heel has completely healed.    The patient does see endocrinology for type 1 diabetes.  He has not been able to tolerate doses of Lantus higher than 10 units.  He does use Dexcom device and his blood glucoses today have ranged from .  Current blood glucose is 125, after he treated himself with a piece of candy for the blood glucose of 73.    Patient's PMR from outside medical facility reviewed and noted.    Past Medical History:   Past Medical History:   Diagnosis Date   • Cataract    • Diabetes mellitus (HCC)    • Falls    • Gait abnormality    • GERD (gastroesophageal reflux disease)    • Hypothyroidism    • Muscle spasm    • Nicotine dependence    • Rhabdomyolysis    • Tremor      Past Surgical History:  Past Surgical History:   Procedure Laterality Date   • OTHER SURGICAL HISTORY      cyst on tailbone, left big toe     Social History:  reports that he has been smoking cigarettes. He has been smoking an average of .5 packs per day. He has never used smokeless tobacco. Alcohol use questions deferred to the physician. Drug use questions deferred to the physician.    Family History: family history includes Alzheimer's disease in his father; No Known Problems in his mother.      Allergies:  No Known Allergies  Medications:  Prior to Admission medications    Medication Sig Start Date End Date Taking? Authorizing  Provider   aspirin 81 MG EC tablet Take 1 tablet by mouth Daily. 12/1/22  Yes Diana Luz APRN   Continuous Blood Gluc  (Dexcom G6 ) device 1 each Take As Directed. 2/28/22  Yes Kesha Del Cid APRN   Continuous Blood Gluc Sensor (Dexcom G6 Sensor) Every 10 (Ten) Days. 7/23/21  Yes Raissa Olivera APRN   Continuous Blood Gluc Transmit (Dexcom G6 Transmitter) misc 1 each Take As Directed. 2/28/22  Yes Kesha Del Cid APRN   Glucagon, rDNA, (Glucagon Emergency) 1 MG kit Inject 1 mg as directed As Needed (AS NEEDED FOR LOW SUGAR). 7/23/20  Yes Provider, MD Deanna   ibuprofen (ADVIL,MOTRIN) 200 MG tablet Take 3 tablets by mouth Every 6 (Six) Hours As Needed for Moderate Pain . 7/23/21  Yes Raissa Olivera APRN   insulin aspart (NovoLOG FlexPen) 100 UNIT/ML solution pen-injector sc pen 4 up to 6 untis plus meals sliding scale:151-200-2 UNITS 201-250 -4 UNITS 251-300-6 UNITS 301-350-8 UNITS 351-400-10 UNITS 401 +-12 UNITS 8/15/22  Yes Tee Gonsales APRN   Insulin Glargine (Lantus SoloStar) 100 UNIT/ML injection pen Inject 10 Units under the skin into the appropriate area as directed Every Night. 8/15/22  Yes Tee Gonsales APRN   Insulin Pen Needle (BD Pen Needle Stephanie U/F) 32G X 4 MM misc 1 each by Other route 4 (Four) Times a Day. 5/3/22  Yes Tee Gonsales APRN   levothyroxine (SYNTHROID, LEVOTHROID) 175 MCG tablet Take 1 tablet by mouth Daily. 8/17/22  Yes Diana Luz APRN   Multiple Minerals (Calcium-Magnesium-Zinc) tablet Take 2 tablets by mouth Daily. 9/7/22  Yes Diana Luz APRN   potassium chloride (K-DUR,KLOR-CON) 20 MEQ CR tablet Take 1 tablet by mouth Daily. HOLD UNTIL FURTHER INSTRUCTED 12/27/22  Yes Diana Luz APRN   tamsulosin (FLOMAX) 0.4 MG capsule 24 hr capsule Take 1 capsule by mouth Every Night. 8/2/22  Yes Mendoza Alberts PA   vitamin D (ERGOCALCIFEROL) 1.25 MG (45191 UT) capsule capsule Take 1  "capsule by mouth Every 7 (Seven) Days. 8/17/22  Yes Diana Luz APRN   MAGNESIUM-POTASSIUM PO Take  by mouth.  12/27/22 Yes Provider, MD Deanna   denosumab (PROLIA) 60 MG/ML solution prefilled syringe syringe Inject 1 mL under the skin into the appropriate area as directed 1 (One) Time for 1 dose. 8/13/21 8/13/21  Kesha Del Cid APRN   potassium chloride (K-DUR,KLOR-CON) 20 MEQ CR tablet TAKE ONE TABLET BY MOUTH EVERY DAY 10/25/22 12/27/22  Diana Luz APRN       Objective     Vital Signs: /58   Pulse 75   Temp 96.9 °F (36.1 °C) (Temporal)   Ht 182.9 cm (72\")   Wt 78 kg (172 lb)   SpO2 98%   BMI 23.33 kg/m²   Physical Exam  Vitals and nursing note reviewed.   Constitutional:       General: He is not in acute distress.     Appearance: He is not ill-appearing or toxic-appearing.   HENT:      Head: Normocephalic and atraumatic.      Mouth/Throat:      Mouth: Mucous membranes are moist.      Pharynx: Oropharynx is clear.   Cardiovascular:      Rate and Rhythm: Normal rate and regular rhythm.      Pulses: Normal pulses.      Heart sounds: Normal heart sounds.   Pulmonary:      Effort: Pulmonary effort is normal.      Breath sounds: No wheezing, rhonchi or rales.   Abdominal:      General: Bowel sounds are normal. There is no distension.      Palpations: Abdomen is soft.      Tenderness: There is no abdominal tenderness.   Musculoskeletal:         General: No swelling or tenderness. Normal range of motion.      Cervical back: Normal range of motion and neck supple. No tenderness.   Feet:      Comments: Very small 1-2 mm ulcerated lesion to posterior left heel.  No surrounding erythema, fluctuance, induration, warmth.  Right heel does have a dried, scabbed area but no open wound visible.  Skin:     General: Skin is warm and dry.      Findings: No erythema or rash.   Neurological:      General: No focal deficit present.      Mental Status: He is alert and oriented to person, " place, and time.   Psychiatric:         Mood and Affect: Mood normal.         Behavior: Behavior normal.         Thought Content: Thought content normal.         Judgment: Judgment normal.       BMI is within normal parameters. No other follow-up for BMI required.    Results Reviewed:  Reviewed last office visit note with myself from 9/7/2022.  Reviewed labs from 12/2/2022-CMP, hemoglobin A1c, lipid panel.  Reviewed last endocrinology office visit note-telemedicine visit from 12/22/2022.    Assessment / Plan     Assessment/Plan:  Diagnoses and all orders for this visit:    1. Type 1 diabetes mellitus with hypoglycemia and without coma (HCC) (Primary)    2. Hyperkalemia  -     Basic metabolic panel    3. Open wound of left heel, initial encounter    4. History of stroke    Other orders  -     potassium chloride (K-DUR,KLOR-CON) 20 MEQ CR tablet; Take 1 tablet by mouth Daily. HOLD UNTIL FURTHER INSTRUCTED  Dispense: 90 tablet; Refill: 3    Patient presents to the office today for a 3-month follow-up.  His diabetes is being followed closely and managed by endocrinology.  He was last seen via telemedicine on 12/22/2022.  Patient is to continue Lantus 10 units as he does not tolerate higher dosages.  He is taking scheduled NovoLog with meals plus a sliding scale if blood glucose is above 150.  He does use Dexcom device.  Hemoglobin A1c is 8.7%, previously 8.5 in August.    Patient's labs were assessed on 12/2/2022 which indicated hyperglycemia with glucose at 514.  The patient does seem to be brittle at times.  Sodium level was likely pseudohyponatremia given elevated blood glucose.  Potassium level was elevated at 5.4.  He had been asked to hold his potassium supplement following these labs, and has been doing so.  We will reassess a BMP today.  We will follow-up with these results and make changes to plan of care as necessary.    Patient will continue care for the small left heel wound as per his assisted living  facility with daily dressing changes with a Band-Aid.  He did complete a course of oral antibiotics and is using an antibiotic ointment.    The patient does have a history of stroke 2 years ago per his report.  He is now taking aspirin 81 mg daily for secondary stroke prevention.  His LDL cholesterol is 91, goal less than 70 with history of stroke.  He is unsure at this point if he is agreeable to cholesterol medication.  We will continue to reassess and discuss with patient.  He is due for annual physical in March, so lipid panel will be reassessed at that time.    Return in about 3 months (around 3/27/2023) for Medicare Wellness. unless patient needs to be seen sooner or acute issues arise.    I have discussed the patient results/orders and and plan/recommendation with them at today's visit.      Diana Luz, APRN   12/27/2022

## 2022-12-29 LAB
BUN SERPL-MCNC: 26 MG/DL (ref 8–23)
BUN/CREAT SERPL: 21.5 (ref 7–25)
CALCIUM SERPL-MCNC: 9.5 MG/DL (ref 8.6–10.5)
CHLORIDE SERPL-SCNC: 103 MMOL/L (ref 98–107)
CO2 SERPL-SCNC: 27.7 MMOL/L (ref 22–29)
CREAT SERPL-MCNC: 1.21 MG/DL (ref 0.76–1.27)
EGFRCR SERPLBLD CKD-EPI 2021: 65.2 ML/MIN/1.73
GLUCOSE SERPL-MCNC: 176 MG/DL (ref 65–99)
POTASSIUM SERPL-SCNC: 4.3 MMOL/L (ref 3.5–5.2)
SODIUM SERPL-SCNC: 140 MMOL/L (ref 136–145)

## 2022-12-29 NOTE — PROGRESS NOTES
Electrolytes look good. I think he can continue to hold his potassium supplement and we'll continue to keep an eye on this. Encourage drinking 48-64 oz of water daily.

## 2023-01-23 DIAGNOSIS — E03.9 ACQUIRED HYPOTHYROIDISM: ICD-10-CM

## 2023-01-23 RX ORDER — LEVOTHYROXINE SODIUM 175 UG/1
TABLET ORAL
Qty: 90 TABLET | Refills: 3 | Status: SHIPPED | OUTPATIENT
Start: 2023-01-23

## 2023-03-20 ENCOUNTER — LAB (OUTPATIENT)
Dept: INTERNAL MEDICINE | Facility: CLINIC | Age: 68
End: 2023-03-20
Payer: MEDICARE

## 2023-03-20 DIAGNOSIS — M81.0 OSTEOPOROSIS, UNSPECIFIED OSTEOPOROSIS TYPE, UNSPECIFIED PATHOLOGICAL FRACTURE PRESENCE: ICD-10-CM

## 2023-03-20 DIAGNOSIS — Z79.899 ENCOUNTER FOR LONG-TERM CURRENT USE OF MEDICATION: ICD-10-CM

## 2023-03-20 DIAGNOSIS — E10.649 TYPE 1 DIABETES MELLITUS WITH HYPOGLYCEMIA AND WITHOUT COMA: ICD-10-CM

## 2023-03-20 DIAGNOSIS — E55.9 VITAMIN D DEFICIENCY: ICD-10-CM

## 2023-03-20 DIAGNOSIS — Z12.5 PROSTATE CANCER SCREENING: ICD-10-CM

## 2023-03-20 DIAGNOSIS — Z12.5 SCREENING PSA (PROSTATE SPECIFIC ANTIGEN): Primary | ICD-10-CM

## 2023-03-20 DIAGNOSIS — E03.8 OTHER SPECIFIED HYPOTHYROIDISM: ICD-10-CM

## 2023-03-21 LAB
25(OH)D3+25(OH)D2 SERPL-MCNC: 60.3 NG/ML (ref 30–100)
ALBUMIN SERPL-MCNC: 4.6 G/DL (ref 3.5–5.2)
ALBUMIN/CREAT UR: 5 MG/G CREAT (ref 0–29)
ALBUMIN/GLOB SERPL: 1.6 G/DL
ALP SERPL-CCNC: 93 U/L (ref 39–117)
ALT SERPL-CCNC: 11 U/L (ref 1–41)
APPEARANCE UR: CLEAR
AST SERPL-CCNC: 11 U/L (ref 1–40)
BACTERIA #/AREA URNS HPF: ABNORMAL /HPF
BASOPHILS # BLD AUTO: 0.06 10*3/MM3 (ref 0–0.2)
BASOPHILS NFR BLD AUTO: 0.8 % (ref 0–1.5)
BILIRUB SERPL-MCNC: 0.4 MG/DL (ref 0–1.2)
BILIRUB UR QL STRIP: NEGATIVE
BUN SERPL-MCNC: 19 MG/DL (ref 8–23)
BUN/CREAT SERPL: 20.2 (ref 7–25)
CALCIUM SERPL-MCNC: 9.6 MG/DL (ref 8.6–10.5)
CASTS URNS MICRO: ABNORMAL
CHLORIDE SERPL-SCNC: 97 MMOL/L (ref 98–107)
CHOLEST SERPL-MCNC: 166 MG/DL (ref 0–200)
CO2 SERPL-SCNC: 25.3 MMOL/L (ref 22–29)
COLOR UR: YELLOW
CREAT SERPL-MCNC: 0.94 MG/DL (ref 0.76–1.27)
CREAT UR-MCNC: 66.2 MG/DL
EGFRCR SERPLBLD CKD-EPI 2021: 88.3 ML/MIN/1.73
EOSINOPHIL # BLD AUTO: 0.21 10*3/MM3 (ref 0–0.4)
EOSINOPHIL NFR BLD AUTO: 2.8 % (ref 0.3–6.2)
EPI CELLS #/AREA URNS HPF: ABNORMAL /HPF
ERYTHROCYTE [DISTWIDTH] IN BLOOD BY AUTOMATED COUNT: 11.4 % (ref 12.3–15.4)
GLOBULIN SER CALC-MCNC: 2.8 GM/DL
GLUCOSE SERPL-MCNC: 364 MG/DL (ref 65–99)
GLUCOSE UR QL STRIP: ABNORMAL
HBA1C MFR BLD: 7.9 % (ref 4.8–5.6)
HCT VFR BLD AUTO: 43.1 % (ref 37.5–51)
HDLC SERPL-MCNC: 60 MG/DL (ref 40–60)
HGB BLD-MCNC: 13.9 G/DL (ref 13–17.7)
HGB UR QL STRIP: NEGATIVE
IMM GRANULOCYTES # BLD AUTO: 0.01 10*3/MM3 (ref 0–0.05)
IMM GRANULOCYTES NFR BLD AUTO: 0.1 % (ref 0–0.5)
KETONES UR QL STRIP: ABNORMAL
LDLC SERPL CALC-MCNC: 93 MG/DL (ref 0–100)
LEUKOCYTE ESTERASE UR QL STRIP: NEGATIVE
LYMPHOCYTES # BLD AUTO: 1.25 10*3/MM3 (ref 0.7–3.1)
LYMPHOCYTES NFR BLD AUTO: 16.9 % (ref 19.6–45.3)
MCH RBC QN AUTO: 30.2 PG (ref 26.6–33)
MCHC RBC AUTO-ENTMCNC: 32.3 G/DL (ref 31.5–35.7)
MCV RBC AUTO: 93.5 FL (ref 79–97)
MICROALBUMIN UR-MCNC: 3.3 UG/ML
MONOCYTES # BLD AUTO: 0.61 10*3/MM3 (ref 0.1–0.9)
MONOCYTES NFR BLD AUTO: 8.3 % (ref 5–12)
NEUTROPHILS # BLD AUTO: 5.25 10*3/MM3 (ref 1.7–7)
NEUTROPHILS NFR BLD AUTO: 71.1 % (ref 42.7–76)
NITRITE UR QL STRIP: POSITIVE
NRBC BLD AUTO-RTO: 0 /100 WBC (ref 0–0.2)
PH UR STRIP: 6.5 [PH] (ref 5–8)
PLATELET # BLD AUTO: 185 10*3/MM3 (ref 140–450)
POTASSIUM SERPL-SCNC: 4.5 MMOL/L (ref 3.5–5.2)
PROT SERPL-MCNC: 7.4 G/DL (ref 6–8.5)
PROT UR QL STRIP: NEGATIVE
PSA SERPL-MCNC: 0.8 NG/ML (ref 0–4)
RBC # BLD AUTO: 4.61 10*6/MM3 (ref 4.14–5.8)
RBC #/AREA URNS HPF: ABNORMAL /HPF
SODIUM SERPL-SCNC: 138 MMOL/L (ref 136–145)
SP GR UR STRIP: 1.03 (ref 1–1.03)
TRIGL SERPL-MCNC: 67 MG/DL (ref 0–150)
TSH SERPL DL<=0.005 MIU/L-ACNC: 1.92 UIU/ML (ref 0.27–4.2)
UROBILINOGEN UR STRIP-MCNC: ABNORMAL MG/DL
VLDLC SERPL CALC-MCNC: 13 MG/DL (ref 5–40)
WBC # BLD AUTO: 7.39 10*3/MM3 (ref 3.4–10.8)
WBC #/AREA URNS HPF: ABNORMAL /HPF

## 2023-03-21 NOTE — PROGRESS NOTES
Results discussed with patient's brother via telephone.  He states he has not mentioned any urinary symptoms but is supposed to be asking the patient and will call us back if he is having urinary symptoms as he does have positive nitrites and bacteria noted on urinalysis.

## 2023-03-27 ENCOUNTER — OFFICE VISIT (OUTPATIENT)
Dept: INTERNAL MEDICINE | Facility: CLINIC | Age: 68
End: 2023-03-27
Payer: MEDICARE

## 2023-03-27 VITALS
HEIGHT: 72 IN | TEMPERATURE: 97.8 F | RESPIRATION RATE: 18 BRPM | BODY MASS INDEX: 23.84 KG/M2 | HEART RATE: 78 BPM | OXYGEN SATURATION: 96 % | SYSTOLIC BLOOD PRESSURE: 98 MMHG | DIASTOLIC BLOOD PRESSURE: 60 MMHG | WEIGHT: 176 LBS

## 2023-03-27 DIAGNOSIS — N30.90 CYSTITIS WITHOUT HEMATURIA: ICD-10-CM

## 2023-03-27 DIAGNOSIS — M85.80 OSTEOPENIA, UNSPECIFIED LOCATION: ICD-10-CM

## 2023-03-27 DIAGNOSIS — E03.8 OTHER SPECIFIED HYPOTHYROIDISM: ICD-10-CM

## 2023-03-27 DIAGNOSIS — E10.65 TYPE 1 DIABETES MELLITUS WITH HYPERGLYCEMIA: ICD-10-CM

## 2023-03-27 DIAGNOSIS — Z86.73 HISTORY OF CARDIOEMBOLIC CEREBROVASCULAR ACCIDENT (CVA): ICD-10-CM

## 2023-03-27 DIAGNOSIS — E08.42 DIABETIC POLYNEUROPATHY ASSOCIATED WITH DIABETES MELLITUS DUE TO UNDERLYING CONDITION: ICD-10-CM

## 2023-03-27 DIAGNOSIS — Z87.891 PERSONAL HISTORY OF NICOTINE DEPENDENCE: ICD-10-CM

## 2023-03-27 DIAGNOSIS — Z00.00 ENCOUNTER FOR SUBSEQUENT ANNUAL WELLNESS VISIT (AWV) IN MEDICARE PATIENT: Primary | ICD-10-CM

## 2023-03-27 DIAGNOSIS — Z12.2 SCREENING FOR LUNG CANCER: ICD-10-CM

## 2023-03-27 DIAGNOSIS — F17.200 SMOKER: ICD-10-CM

## 2023-03-27 DIAGNOSIS — Z12.11 COLON CANCER SCREENING: ICD-10-CM

## 2023-03-27 RX ORDER — METOPROLOL SUCCINATE 50 MG/1
50 TABLET, EXTENDED RELEASE ORAL DAILY
COMMUNITY
End: 2023-03-27

## 2023-03-27 RX ORDER — OLANZAPINE 7.5 MG/1
7.5 TABLET ORAL NIGHTLY
COMMUNITY
End: 2023-03-27

## 2023-03-27 RX ORDER — DIGOXIN 125 MCG
125 TABLET ORAL
COMMUNITY
End: 2023-03-27

## 2023-03-27 RX ORDER — PANTOPRAZOLE SODIUM 40 MG/1
40 TABLET, DELAYED RELEASE ORAL DAILY
COMMUNITY
End: 2023-03-27

## 2023-03-27 RX ORDER — CIPROFLOXACIN 500 MG/1
500 TABLET, FILM COATED ORAL 2 TIMES DAILY
Qty: 14 TABLET | Refills: 0 | Status: SHIPPED | OUTPATIENT
Start: 2023-03-27 | End: 2023-04-03

## 2023-03-27 RX ORDER — LAMOTRIGINE 25 MG/1
25 TABLET ORAL DAILY
COMMUNITY
End: 2023-03-27

## 2023-03-27 RX ORDER — LYSINE HCL 500 MG
1 TABLET ORAL 2 TIMES DAILY
COMMUNITY
End: 2023-03-27

## 2023-03-27 RX ORDER — FUROSEMIDE 40 MG/1
40 TABLET ORAL DAILY
COMMUNITY
End: 2023-03-27

## 2023-03-27 RX ORDER — OXYBUTYNIN CHLORIDE 5 MG/1
5 TABLET ORAL DAILY
COMMUNITY
End: 2023-03-27

## 2023-03-27 RX ORDER — SIMVASTATIN 80 MG
80 TABLET ORAL NIGHTLY
COMMUNITY
End: 2023-03-27

## 2023-03-27 RX ORDER — MEMANTINE HYDROCHLORIDE 10 MG/1
10 TABLET ORAL 2 TIMES DAILY
COMMUNITY
End: 2023-03-27

## 2023-03-27 RX ORDER — ANTIOX #8/OM3/DHA/EPA/LUT/ZEAX 250-2.5 MG
1 CAPSULE ORAL 2 TIMES DAILY
COMMUNITY
End: 2023-03-27

## 2023-03-27 RX ORDER — ACETAMINOPHEN 325 MG/1
650 TABLET ORAL EVERY 4 HOURS
COMMUNITY

## 2023-03-28 ENCOUNTER — OFFICE VISIT (OUTPATIENT)
Dept: ENDOCRINOLOGY | Facility: CLINIC | Age: 68
End: 2023-03-28
Payer: MEDICARE

## 2023-03-28 VITALS
HEIGHT: 72 IN | BODY MASS INDEX: 24.24 KG/M2 | SYSTOLIC BLOOD PRESSURE: 98 MMHG | HEART RATE: 73 BPM | DIASTOLIC BLOOD PRESSURE: 68 MMHG | WEIGHT: 179 LBS | OXYGEN SATURATION: 99 %

## 2023-03-28 DIAGNOSIS — M81.0 OSTEOPOROSIS, UNSPECIFIED OSTEOPOROSIS TYPE, UNSPECIFIED PATHOLOGICAL FRACTURE PRESENCE: ICD-10-CM

## 2023-03-28 DIAGNOSIS — E10.65 TYPE 1 DIABETES MELLITUS WITH HYPERGLYCEMIA: Primary | ICD-10-CM

## 2023-03-28 DIAGNOSIS — E03.8 OTHER SPECIFIED HYPOTHYROIDISM: ICD-10-CM

## 2023-03-28 PROCEDURE — 1159F MED LIST DOCD IN RCRD: CPT | Performed by: NURSE PRACTITIONER

## 2023-03-28 PROCEDURE — 99214 OFFICE O/P EST MOD 30 MIN: CPT | Performed by: NURSE PRACTITIONER

## 2023-03-28 PROCEDURE — 3051F HG A1C>EQUAL 7.0%<8.0%: CPT | Performed by: NURSE PRACTITIONER

## 2023-03-28 PROCEDURE — 95251 CONT GLUC MNTR ANALYSIS I&R: CPT | Performed by: NURSE PRACTITIONER

## 2023-03-28 PROCEDURE — 1160F RVW MEDS BY RX/DR IN RCRD: CPT | Performed by: NURSE PRACTITIONER

## 2023-03-28 RX ORDER — GLUCAGON 3 MG/1
1 POWDER NASAL AS NEEDED
Qty: 2 EACH | Refills: 11 | Status: SHIPPED | OUTPATIENT
Start: 2023-03-28 | End: 2023-03-31 | Stop reason: SDUPTHER

## 2023-03-28 NOTE — PROGRESS NOTES
"Chief Complaint  Diabetes    Subjective          Hi Colbert presents to [facility] ENDOCRINOLOGY  Diabetes          In office visit     Primary provider ELANA Scott     67 year old male presents for follow up    Diabetes mellitus type 1    Duration - diagnosed at age 22     Timing constant     Quality not controlled      Severity high       Macrovascular complications---TIA      Microvascular complications ---neuropathy         Left great toe amputation         History of Renal stones --- on Flomax         Current diabetes regimen       Insulin by injections                Current glucose monitoring      Dexcom G6 ---he is using       See below       Review of Systems - General ROS: negative                  Objective   Vital Signs:   BP 98/68   Pulse 73   Ht 182.9 cm (72\")   Wt 81.2 kg (179 lb)   SpO2 99%   BMI 24.28 kg/m²     Physical Exam  Constitutional:       Appearance: Normal appearance.   Cardiovascular:      Rate and Rhythm: Regular rhythm.      Heart sounds: Normal heart sounds.   Musculoskeletal:      Cervical back: Normal range of motion.   Neurological:      General: No focal deficit present.      Mental Status: He is alert.        Result Review :   The following data was reviewed by: ELANA Jasso on 05/03/2022:  Common labs    Common Labs 12/2/22 12/2/22 12/2/22 12/27/22 3/20/23 3/20/23 3/20/23 3/20/23 3/20/23 3/20/23    0658 0658 0658  1001 1001 1001 1001 1001 1001   Glucose   514 (A) 176 (A) 364 (A)        BUN   23 26 (A) 19        Creatinine   1.03 1.21 0.94        Sodium   135 (A) 140 138        Potassium   5.4 (A) 4.3 4.5        Chloride   98 103 97 (A)        Calcium   9.1 9.5 9.6        Total Protein   6.8  7.4        Albumin   4.50  4.6        Total Bilirubin   0.3  0.4        Alkaline Phosphatase   91  93        AST (SGOT)   14  11        ALT (SGPT)   15  11        WBC      7.39       Hemoglobin      13.9       Hematocrit      " 43.1       Platelets      185       Total Cholesterol 163      166      Triglycerides 51      67      HDL Cholesterol 61 (A)      60      LDL Cholesterol  91      93      Hemoglobin A1C  8.70 (A)       7.90 (A)    Microalbumin, Urine          3.3   PSA        0.802     (A) Abnormal value       Comments are available for some flowsheets but are not being displayed.                       Assessment and Plan    Diagnoses and all orders for this visit:    1. Type 1 diabetes mellitus with hyperglycemia (HCC) (Primary)    2. Osteoporosis, unspecified osteoporosis type, unspecified pathological fracture presence    3. Other specified hypothyroidism    Other orders  -     Glucagon (Baqsimi One Pack) 3 MG/DOSE powder; 1 each into the nostril(s) as directed by provider As Needed (Hypoglycemia). Apply intranasal if hypoglycemia  Dispense: 2 each; Refill: 11             Glycemic Management:        Type diabetes mellitus type 1      Lab Results   Component Value Date    HGBA1C 7.90 (H) 03/20/2023          Dexcom G6 --        Ambulatory Glucose Profile Report    Days Analyzed : 2 week period ending on 03/28/23      Continuous Glucose Monitory Device:  Dexcom G6     - 23% very high target range  - 25% high target range  - 50% in target range  - 1% below target range  - GMI 7.8%  - Average glucose 190 mg/dl    Interpretation : Diabetes Type 1      he is in target 50 % of the time , he has hyperglycemia in the am     He climbs at night , and then high after breakfast           Taking lantus 10 units - increase to 11 units              Taking Novolog    For breakfast give 5 units -- change to 6 units     Keep 4 units for lunch --no change     Taking 5 units for supper no change     +     Sliding scale     2 per 50 above 150        we discussed if eating less he needs to give less insulin     And if eating more then he can add one unit       Call in glucagon --baqsimi               Microvascular Complications Monitoring         Last  eye exam---- April 2022,  no DR               Neuropathy --yes                      Lipid Management:      Not on statin        Total Cholesterol   Date Value Ref Range Status   05/30/2021 92 0 - 200 mg/dL Final     Triglycerides   Date Value Ref Range Status   03/20/2023 67 0 - 150 mg/dL Final   05/30/2021 87 0 - 150 mg/dL Final   10/13/2020 65 0 - 200 mg/dL Final     HDL Cholesterol   Date Value Ref Range Status   03/20/2023 60 40 - 60 mg/dL Final   05/30/2021 48 40 - 60 mg/dL Final     LDL Cholesterol    Date Value Ref Range Status   10/13/2020 86 30 - 100 mg/dL Final     LDL Chol Calc (NIH)   Date Value Ref Range Status   03/20/2023 93 0 - 100 mg/dL Final        Blood Pressure Management:        Thyroid Health        Lab Results   Component Value Date    TSH 1.920 03/20/2023          Taking levothyroxine 175  mcg daily                 Preventive Care:         Smoker     Hi Colbert  reports that he has been smoking cigarettes. He has been smoking an average of .75 packs per day. He has never used smokeless tobacco.. I have educated him on the risk of diseases from using tobacco products such as cancer, COPD and heart disease.     I advised him to quit and he is not willing to quit.    I spent 3  minutes counseling the patient.          Bone  Health       bone density      Osteoporosis          Based on FRAX score he has osteoporosis 4.6      Taking calcium + d     Prolia every 6 months           next DEXA July 2023               Follow Up   Return in about 3 months (around 6/28/2023).  Patient was given instructions and counseling regarding his condition or for health maintenance advice. Please see specific information pulled into the AVS if appropriate.         This document has been electronically signed by ELANA Jasso on March 28, 2023 16:30 CDT.

## 2023-03-28 NOTE — PROGRESS NOTES
The ABCs of the Annual Wellness Visit  Subsequent Medicare Wellness Visit    Subjective    Hi Colbert is a 67 y.o. male who presents for a Subsequent Medicare Wellness Visit.    The following portions of the patient's history were reviewed and   updated as appropriate: allergies, current medications, past family history, past medical history, past social history, past surgical history and problem list.    Compared to one year ago, the patient feels his physical   health is the same.    Compared to one year ago, the patient feels his mental   health is the same.    Recent Hospitalizations:  He was not admitted to the hospital during the last year.       Current Medical Providers:  Patient Care Team:  Diana Luz APRN as PCP - General (Nurse Practitioner)    Outpatient Medications Prior to Visit   Medication Sig Dispense Refill   • acetaminophen (TYLENOL) 325 MG tablet Take 2 tablets by mouth Every 4 (Four) Hours.     • Continuous Blood Gluc  (Dexcom G6 ) device 1 each Take As Directed. 1 each 0   • Continuous Blood Gluc Sensor (Dexcom G6 Sensor) Every 10 (Ten) Days. 9 each 3   • Continuous Blood Gluc Transmit (Dexcom G6 Transmitter) misc 1 each Take As Directed. 1 each 0   • Glucagon, rDNA, (Glucagon Emergency) 1 MG kit Inject 1 mg as directed As Needed (AS NEEDED FOR LOW SUGAR).     • ibuprofen (ADVIL,MOTRIN) 200 MG tablet Take 3 tablets by mouth Every 6 (Six) Hours As Needed for Moderate Pain . 500 tablet 3   • insulin aspart (NovoLOG FlexPen) 100 UNIT/ML solution pen-injector sc pen 4 up to 6 untis plus meals sliding scale:151-200-2 UNITS 201-250 -4 UNITS 251-300-6 UNITS 301-350-8 UNITS 351-400-10 UNITS 401 +-12 UNITS 4 pen 11   • Insulin Glargine (Lantus SoloStar) 100 UNIT/ML injection pen Inject 10 Units under the skin into the appropriate area as directed Every Night. 3 pen 11   • Insulin Pen Needle (BD Pen Needle Stephanie U/F) 32G X 4 MM misc 1 each by Other route 4 (Four) Times a  Day. 200 each 11   • levothyroxine (SYNTHROID, LEVOTHROID) 175 MCG tablet TAKE ONE TABLET BY MOUTH EVERY DAY 90 tablet 3   • tamsulosin (FLOMAX) 0.4 MG capsule 24 hr capsule Take 1 capsule by mouth Every Night. 90 capsule 3   • vitamin D (ERGOCALCIFEROL) 1.25 MG (69895 UT) capsule capsule Take 1 capsule by mouth Every 7 (Seven) Days. 12 capsule 1   • aspirin 81 MG EC tablet Take 1 tablet by mouth Daily. 180 tablet 1   • denosumab (PROLIA) 60 MG/ML solution prefilled syringe syringe Inject 1 mL under the skin into the appropriate area as directed 1 (One) Time for 1 dose. 1 mL 0   • Calcium Carbonate-Vit D-Min (Calcium 600+D Plus Minerals) 600-400 MG-UNIT tablet Take 1 tablet by mouth 2 (Two) Times a Day.     • digoxin (LANOXIN) 125 MCG tablet Take 1 tablet by mouth Daily.     • furosemide (LASIX) 40 MG tablet Take 1 tablet by mouth Daily.     • lamoTRIgine (LaMICtal) 25 MG tablet Take 1 tablet by mouth Daily.     • memantine (NAMENDA) 10 MG tablet Take 1 tablet by mouth 2 (Two) Times a Day.     • metoprolol succinate XL (TOPROL-XL) 50 MG 24 hr tablet Take 1 tablet by mouth Daily.     • Multiple Minerals (Calcium-Magnesium-Zinc) tablet Take 2 tablets by mouth Daily.     • multivitamins-minerals (PRESERVISION AREDS 2) capsule capsule Take 1 capsule by mouth 2 (Two) Times a Day.     • OLANZapine (zyPREXA) 7.5 MG tablet Take 1 tablet by mouth Every Night.     • oxybutynin (DITROPAN) 5 MG tablet Take 1 tablet by mouth Daily.     • pantoprazole (PROTONIX) 40 MG EC tablet Take 1 tablet by mouth Daily.     • potassium chloride (K-DUR,KLOR-CON) 20 MEQ CR tablet Take 1 tablet by mouth Daily. HOLD UNTIL FURTHER INSTRUCTED 90 tablet 3   • rivaroxaban (XARELTO) 20 MG tablet Take 1 tablet by mouth Daily.     • simvastatin (ZOCOR) 80 MG tablet Take 1 tablet by mouth Every Night.       No facility-administered medications prior to visit.       No opioid medication identified on active medication list. I have reviewed chart for other  "potential  high risk medication/s and harmful drug interactions in the elderly.          Aspirin is on active medication list. Aspirin use is indicated based on review of current medical condition/s. Pros and cons of this therapy have been discussed today. Benefits of this medication outweigh potential harm.  Patient has been encouraged to continue taking this medication.  .      Patient Active Problem List   Diagnosis   • Amputated toe of left foot (HCC)   • Autonomic neuropathy   • Smoker   • Diabetic polyneuropathy associated with diabetes mellitus due to underlying condition ([facility])   • Hammer toe   • HL (hallux limitus), unspecified laterality   • Hyperglycemia   • Hypotension   • Hypothyroidism   • Mild dehydration   • Multiple falls   • Type 1 diabetes mellitus with hypoglycemia and without coma ([facility])   • Unable to care for self   • Diabetic ketoacidosis without coma associated with type 1 diabetes mellitus ([facility])   • UTI (urinary tract infection) due to urinary indwelling catheter ([facility])   • Epiretinal membrane   • Ingrown nail   • Lumbar spinal stenosis   • Metatarsalgia of both feet   • Peripheral autonomic neuropathy in disorders classified elsewhere   • Type 1 diabetes mellitus with hyperglycemia ([facility])   • Vitamin D deficiency   • Osteoporosis     Advance Care Planning  Advance Directive is on file.  ACP discussion was held with the patient during this visit. Patient has an advance directive in EMR which is still valid.      Objective    Vitals:    03/27/23 1435   BP: 98/60   BP Location: Left arm   Patient Position: Sitting   Cuff Size: Adult   Pulse: 78   Resp: 18   Temp: 97.8 °F (36.6 °C)   TempSrc: Infrared   SpO2: 96%   Weight: 79.8 kg (176 lb)   Height: 182.9 cm (72\")   PainSc: 0-No pain     Estimated body mass index is 23.87 kg/m² as calculated from the following:    Height as of this encounter: 182.9 cm (72\").    Weight as of this encounter: 79.8 kg (176 lb).    BMI is within normal parameters. No " other follow-up for BMI required.      Does the patient have evidence of cognitive impairment? No    Lab Results   Component Value Date    CHLPL 166 03/20/2023    TRIG 67 03/20/2023    HDL 60 03/20/2023    LDL 93 03/20/2023    VLDL 13 03/20/2023    HGBA1C 7.90 (H) 03/20/2023        HEALTH RISK ASSESSMENT    Smoking Status:  Social History     Tobacco Use   Smoking Status Every Day   • Packs/day: 0.75   • Types: Cigarettes   Smokeless Tobacco Never   Tobacco Comments    16-17 cigs a day per pt as of 07/22/2021     Alcohol Consumption:  Social History     Substance and Sexual Activity   Alcohol Use Defer     Fall Risk Screen:    MAKSIM Fall Risk Assessment has not been completed.    Depression Screening:  PHQ-2/PHQ-9 Depression Screening 3/15/2022   Little Interest or Pleasure in Doing Things 0-->not at all   Feeling Down, Depressed or Hopeless 0-->not at all   PHQ-9: Brief Depression Severity Measure Score 0       Health Habits and Functional and Cognitive Screening:  Functional & Cognitive Status 3/15/2022   Do you have difficulty preparing food and eating? No   Do you have difficulty bathing yourself, getting dressed or grooming yourself? No   Do you have difficulty using the toilet? No   Do you have difficulty moving around from place to place? Yes   Do you have trouble with steps or getting out of a bed or a chair? No   Current Diet Well Balanced Diet   Dental Exam Unknown   Eye Exam Unknown   Exercise (times per week) 0 times per week   Current Exercises Include No Regular Exercise   Do you need help using the phone?  No   Are you deaf or do you have serious difficulty hearing?  No   Do you need help with transportation? No   Do you need help shopping? No   Do you need help preparing meals?  No   Do you need help with housework?  No   Do you need help with laundry? No   Do you need help taking your medications? No   Do you need help managing money? No   Do you ever drive or ride in a car without wearing a seat  belt? No   Have you felt unusual stress, anger or loneliness in the last month? No   Who do you live with? Community   If you need help, do you have trouble finding someone available to you? No   Have you been bothered in the last four weeks by sexual problems? No   Do you have difficulty concentrating, remembering or making decisions? No       Age-appropriate Screening Schedule:  Refer to the list below for future screening recommendations based on patient's age, sex and/or medical conditions. Orders for these recommended tests are listed in the plan section. The patient has been provided with a written plan.    Health Maintenance   Topic Date Due   • COLORECTAL CANCER SCREENING  Never done   • ZOSTER VACCINE (1 of 2) Never done   • COVID-19 Vaccine (1) 03/29/2023 (Originally 5/3/1956)   • INFLUENZA VACCINE  03/31/2023 (Originally 8/1/2022)   • Pneumococcal Vaccine 65+ (1 - PCV) 03/27/2024 (Originally 11/3/1961)   • TDAP/TD VACCINES (2 - Td or Tdap) 03/27/2024 (Originally 10/26/2022)   • DXA SCAN  08/04/2023   • HEMOGLOBIN A1C  09/20/2023   • DIABETIC EYE EXAM  10/01/2023   • URINE MICROALBUMIN  03/20/2024   • ANNUAL WELLNESS VISIT  03/27/2024   • DIABETIC FOOT EXAM  03/27/2024   • HEPATITIS C SCREENING  Completed   • AAA SCREEN (ONE-TIME)  Completed                CMS Preventative Services Quick Reference  Risk Factors Identified During Encounter  Immunizations Discussed/Encouraged: Influenza, Prevnar 20 (Pneumococcal 20-valent conjugate), Shingrix and COVID19  Inactivity/Sedentary: Patient was advised to exercise at least 150 minutes a week per CDC recommendations.  Tobacco Use/Dependance Risk (use dotphrase .tobaccocessation for documentation)  The above risks/problems have been discussed with the patient.  Pertinent information has been shared with the patient in the After Visit Summary.  An After Visit Summary and PPPS were made available to the patient.    Follow Up:   Next Medicare Wellness visit to be  "scheduled in 1 year.       Additional E&M Note during same encounter follows:  Patient has multiple medical problems which are significant and separately identifiable that require additional work above and beyond the Medicare Wellness Visit.      Chief Complaint  Medicare Wellness-subsequent (Annual medicare wellness exam. )    Subjective        HPI  Hi Colbert is also being seen today for possible urinary tract infection.  On the patient's yearly labs his urinalysis was positive for nitrites and 4+ bacteria.  The patient denies any dysuria, change in the color or odor of his urine.  He denies flank, back, or abdominal pain.  He denies fever or chills.  He does tell me that he was told previously that he does not empty his bladder completely and did see urology for this.  He is currently taking Flomax.  He tells me that he does not necessarily feel as though he does not empty his bladder as he does not have any pressure or discomfort after urinating.       Objective   Vital Signs:  BP 98/60 (BP Location: Left arm, Patient Position: Sitting, Cuff Size: Adult)   Pulse 78   Temp 97.8 °F (36.6 °C) (Infrared)   Resp 18   Ht 182.9 cm (72\")   Wt 79.8 kg (176 lb)   SpO2 96%   BMI 23.87 kg/m²     Physical Exam  Vitals and nursing note reviewed.   Constitutional:       General: He is not in acute distress.     Appearance: He is not ill-appearing or toxic-appearing.   HENT:      Head: Normocephalic and atraumatic.      Mouth/Throat:      Mouth: Mucous membranes are moist.      Pharynx: Oropharynx is clear.   Cardiovascular:      Rate and Rhythm: Normal rate and regular rhythm.      Pulses: Normal pulses.      Heart sounds: Normal heart sounds.   Pulmonary:      Effort: Pulmonary effort is normal.      Breath sounds: No wheezing, rhonchi or rales.   Abdominal:      General: Bowel sounds are normal. There is no distension.      Palpations: Abdomen is soft.      Tenderness: There is no abdominal tenderness. "   Musculoskeletal:         General: No swelling or tenderness. Normal range of motion.      Cervical back: Normal range of motion and neck supple. No tenderness.   Feet:      Right foot:      Protective Sensation: 10 sites tested. 4 sites sensed.      Left foot:      Protective Sensation: 10 sites tested. 4 sites sensed.      Comments: Diabetic foot exam:   Left: Filament test present   Pulses Dorsalis Pedis:  present  Posterior Tibial:  present    Right: Filament test present   Pulses Dorsalis Pedis:  present  Posterior Tibial:  present          Skin:     General: Skin is warm and dry.      Findings: No erythema or rash.   Neurological:      General: No focal deficit present.      Mental Status: He is alert and oriented to person, place, and time.   Psychiatric:         Mood and Affect: Mood normal.         Behavior: Behavior normal.         Thought Content: Thought content normal.         Judgment: Judgment normal.        The following data was reviewed by: ELANA Rodriguez on 03/27/2023:  CMP    CMP 12/2/22 12/27/22 3/20/23   Glucose 514 (A) 176 (A) 364 (A)   BUN 23 26 (A) 19   Creatinine 1.03 1.21 0.94   Sodium 135 (A) 140 138   Potassium 5.4 (A) 4.3 4.5   Chloride 98 103 97 (A)   Calcium 9.1 9.5 9.6   Total Protein 6.8  7.4   Albumin 4.50  4.6   Globulin 2.3  2.8   Total Bilirubin 0.3  0.4   Alkaline Phosphatase 91  93   AST (SGOT) 14  11   ALT (SGPT) 15  11   BUN/Creatinine Ratio 22.3 21.5 20.2   (A) Abnormal value            CBC    CBC 8/15/22 3/20/23   WBC 6.42 7.39   RBC 4.19 4.61   Hemoglobin 12.9 (A) 13.9   Hematocrit 37.6 43.1   MCV 89.7 93.5   MCH 30.8 30.2   MCHC 34.3 32.3   RDW 12.3 11.4 (A)   Platelets 172 185   (A) Abnormal value            Lipid Panel    Lipid Panel 12/2/22 3/20/23   Total Cholesterol 163 166   Triglycerides 51 67   HDL Cholesterol 61 (A) 60   VLDL Cholesterol 11 13   LDL Cholesterol  91 93   (A) Abnormal value       Comments are available for some flowsheets but are not  being displayed.           TSH    TSH 5/12/22 8/15/22 3/20/23   TSH 0.549 0.562 1.920           Most Recent A1C    HGBA1C Most Recent 3/20/23   Hemoglobin A1C 7.90 (A)   (A) Abnormal value       Comments are available for some flowsheets but are not being displayed.           UA    Urinalysis 3/20/23 3/20/23    1001 1001   Blood, UA Negative    Leukocytes, UA Negative    Nitrite, UA Positive (A)    RBC, UA  0-2   Bacteria, UA  4+ (A)   (A) Abnormal value       Comments are available for some flowsheets but are not being displayed.              Assessment and Plan   Diagnoses and all orders for this visit:    1. Encounter for subsequent annual wellness visit (AWV) in Medicare patient (Primary)    2. Colon cancer screening  -     Cancel: Ambulatory Referral to Gastroenterology  -     Ambulatory Referral to Gastroenterology    3. Screening for lung cancer  -     Cancel: CT Chest Low Dose Wo; Future  -     CT Chest Low Dose Wo; Future    4. Personal history of nicotine dependence  -     Cancel: CT Chest Low Dose Wo; Future  -     CT Chest Low Dose Wo; Future    5. Type 1 diabetes mellitus with hyperglycemia (HCC)    6. Diabetic polyneuropathy associated with diabetes mellitus due to underlying condition (HCC)    7. Smoker    8. Cystitis without hematuria  -     ciprofloxacin (Cipro) 500 MG tablet; Take 1 tablet by mouth 2 (Two) Times a Day for 7 days.  Dispense: 14 tablet; Refill: 0    9. Osteopenia, unspecified location    10. Other specified hypothyroidism    11. History of cardioembolic cerebrovascular accident (CVA)      The patient presents to the office today for subsequent Medicare wellness exam.  He denies any acute concerns or complaints today and feels that he is doing fairly well overall.  My biggest concern on his lab work is that his urinalysis was positive for nitrites and 4+ bacteria.  He is asymptomatic with this, however with known BPH prone to urinary retention I would be inclined to treat.  We will  prescribe 7-day course of ciprofloxacin.  Patient will return to the office in 10 days for a nurse visit to reassess urine.    The patient's hemoglobin A1c has improved since this was last assessed in December from 8.7-7.9.  He does have type 1 diabetes.  He monitors his blood glucose continuously with the Dexcom meter and is compliant with this.  I was able to review his blood glucoses for the last 24 hours and the lowest this had gotten was 96.  This did get as high as 347.  The patient does have an appointment with his endocrinologist tomorrow.  He is currently on 10 units of Lantus nightly, apparently has not tolerated higher doses of this very well.  He is also using NovoLog, scheduled plus sliding scale with meals.  The patient does have neuropathy.  Diabetic foot exam performed today and he was able to sense that several sites on each foot, which she states is an improvement for him.  He does go to the ophthalmologist regularly for diabetic eye exam.    The patient states that it has been greater than 10 years since his last colonoscopy.  He denies any acute concerns with his bowels today.  Denies black/tarry or bloody stools.  He is agreeable to GI referral for consideration of colonoscopy.  PSA assessed with yearly labs is normal.    The patient is a current smoker smoking between one half and three quarters of a pack per day.  He tells me that he has smoked since he was 15 years old.  He does qualify for lung cancer screening with low-dose chest CT, which has been ordered.  He has not had this performed previously that he is aware of.    Patient with history of osteopenia, last DEXA scan obtained in August 2021.  He does take Prolia.  He is also taking vitamin D supplement and vitamin D level is within normal limits at 60.3.  We will continue weekly supplement dose.    Patient with history of CVA.  He is maintained on aspirin and statin therapy.  His LDL is slightly above goal at 93.  He is on high  intensity simvastatin, so we may need to consider switching this to atorvastatin to see if we can get his LDL at goal.  Will reassess lipid panel in 3 months.  He is wheelchair-bound so is not able to exercise.  He does have meals provided by the assisted living facility in which he resides.    Hi Colbert  reports that he has been smoking cigarettes. He has been smoking an average of .75 packs per day. He has never used smokeless tobacco.. I have educated him on the risk of diseases from using tobacco products such as cancer, COPD and heart disease.     I advised him to quit and he is not willing to quit.    I spent 3  minutes counseling the patient.       I spent 50 minutes caring for Hi on this date of service. This time includes time spent by me in the following activities:preparing for the visit, reviewing tests, obtaining and/or reviewing a separately obtained history, performing a medically appropriate examination and/or evaluation , counseling and educating the patient/family/caregiver, ordering medications, tests, or procedures and documenting information in the medical record  Follow Up   Return in about 3 months (around 6/27/2023) for Need lipid panel and CMP prior to next appt. Nurse visit 10 days to recheck urine.  Patient was given instructions and counseling regarding his condition or for health maintenance advice. Please see specific information pulled into the AVS if appropriate.

## 2023-03-28 NOTE — PATIENT INSTRUCTIONS
Taking lantus 10 units - increase to 11 units              Taking Novolog    For breakfast give 5 units -- change to 6 units     Keep 4 units for lunch --no change     Taking 5 units for supper no change     +     Sliding scale     2 per 50 above 150

## 2023-03-30 ENCOUNTER — DOCUMENTATION (OUTPATIENT)
Dept: ENDOCRINOLOGY | Facility: CLINIC | Age: 68
End: 2023-03-30
Payer: MEDICARE

## 2023-03-30 RX ORDER — ASPIRIN 81 MG/1
TABLET, COATED ORAL
Qty: 30 TABLET | Refills: 0 | OUTPATIENT
Start: 2023-03-30

## 2023-03-30 RX ORDER — ERGOCALCIFEROL 1.25 MG/1
CAPSULE ORAL
Qty: 4 CAPSULE | Refills: 0 | OUTPATIENT
Start: 2023-03-30

## 2023-03-31 ENCOUNTER — TELEPHONE (OUTPATIENT)
Dept: ENDOCRINOLOGY | Facility: CLINIC | Age: 68
End: 2023-03-31
Payer: MEDICARE

## 2023-03-31 ENCOUNTER — TELEPHONE (OUTPATIENT)
Dept: INTERNAL MEDICINE | Facility: CLINIC | Age: 68
End: 2023-03-31
Payer: MEDICARE

## 2023-03-31 DIAGNOSIS — E10.65 TYPE 1 DIABETES MELLITUS WITH HYPERGLYCEMIA: Primary | ICD-10-CM

## 2023-03-31 RX ORDER — GLUCAGON 3 MG/1
1 POWDER NASAL AS NEEDED
Qty: 2 EACH | Refills: 11 | Status: SHIPPED | OUTPATIENT
Start: 2023-03-31

## 2023-03-31 NOTE — TELEPHONE ENCOUNTER
Patient brother called and is requesting patient Basquimi prescription be sent to     [facility] - [place], [place]    States the patient no longer uses [facility].    Also stated he will wait until the PA comes back to see if the nasal spray will be cheaper and if not he will request a change to the shots.      Phone [phone]      Thank you

## 2023-04-03 ENCOUNTER — DOCUMENTATION (OUTPATIENT)
Dept: ENDOCRINOLOGY | Facility: CLINIC | Age: 68
End: 2023-04-03
Payer: MEDICARE

## 2023-04-06 ENCOUNTER — CLINICAL SUPPORT (OUTPATIENT)
Dept: INTERNAL MEDICINE | Facility: CLINIC | Age: 68
End: 2023-04-06
Payer: MEDICARE

## 2023-04-06 DIAGNOSIS — R82.90 ABNORMAL URINE FINDINGS: Primary | ICD-10-CM

## 2023-04-06 LAB
BILIRUB BLD-MCNC: NEGATIVE MG/DL
CLARITY, POC: CLEAR
COLOR UR: YELLOW
EXPIRATION DATE: ABNORMAL
GLUCOSE UR STRIP-MCNC: ABNORMAL MG/DL
KETONES UR QL: NEGATIVE
LEUKOCYTE EST, POC: ABNORMAL
Lab: ABNORMAL
NITRITE UR-MCNC: NEGATIVE MG/ML
PH UR: 6 [PH] (ref 5–8)
PROT UR STRIP-MCNC: NEGATIVE MG/DL
RBC # UR STRIP: NEGATIVE /UL
SP GR UR: 1.02 (ref 1–1.03)
UROBILINOGEN UR QL: ABNORMAL

## 2023-04-06 NOTE — PROGRESS NOTES
Patient here to give urine sample to recheck urine.  Patient had difficultly giving a sample and took container with him and dropped back off at the clinic for testing.

## 2023-04-08 LAB
BACTERIA UR CULT: NORMAL
BACTERIA UR CULT: NORMAL

## 2023-04-10 NOTE — PROGRESS NOTES
Urine culture shows normal growth, no need for additional treatment at this time.  As always though please let us know if experiencing any symptoms.

## 2023-04-12 ENCOUNTER — HOSPITAL ENCOUNTER (OUTPATIENT)
Dept: CT IMAGING | Facility: HOSPITAL | Age: 68
Discharge: HOME | End: 2023-04-12
Admitting: NURSE PRACTITIONER
Payer: MEDICARE

## 2023-04-12 DIAGNOSIS — Z87.891 PERSONAL HISTORY OF NICOTINE DEPENDENCE: ICD-10-CM

## 2023-04-12 DIAGNOSIS — Z12.2 SCREENING FOR LUNG CANCER: ICD-10-CM

## 2023-04-12 PROCEDURE — 71271 CT THORAX LUNG CANCER SCR C-: CPT

## 2023-04-12 NOTE — PROGRESS NOTES
CT of the chest does show a right upper lobe lung nodule.  This appears to be benign, but we will continue to monitor this with a repeat CT scan in 1 year.  Emphysema also noted, which is damage to the lungs over time from smoking.

## 2023-04-28 ENCOUNTER — TELEPHONE (OUTPATIENT)
Dept: GASTROENTEROLOGY | Facility: CLINIC | Age: 68
End: 2023-04-28
Payer: MEDICARE

## 2023-04-28 NOTE — TELEPHONE ENCOUNTER
Hi Colbert called office to schedule colonoscopy after receiving CALL for fast track option     Hi Colbert does not have GI, HEART, OR LUNG problems at this time     Hi Colbert verbally states they are able to walk up a flight of stairs or down a hayes without shortness of breath or chest pain.       Hi Colbert current medication list was reviewed and He was informed of medications to take morning of procedure as well as the medications that should be held prior to procedure.       I have asked Hi Colbert to contact the office if they have changes to health history or insurance after being scheduled for colonoscopy. @ verbalized understanding.       Hi Colbert was told they must have someone (over the age of 18) to drive them from procedure per policy.   Hi Colbert verbalized understanding that without a  the procedure would be cancelled.           I will mail a copy of instructions and instructed Hi Colbert to contact the office if they have any questions or concerns.         SPOKE WITH PATIENT THIS MORNING. PATIENT WOULD LIKE TO JUST SCHEDULED HIS COLONOSCOPY WITHOUT HIS OFFICE APPOINTMENT. PATIENT DID NOT PICK A DATE - AS HIS DAUGHTER IS ABOUT TO HAVE A BABY AND WOULD CALL BACK AND SCHEDULED THE PROCEDURE. TOLD PATIENT I WOULD CALL BACK IF I DIDN'T HEAR FROM HIM IN ABOUT A WEEK.     THANK YOU, MARTINE

## 2023-05-10 RX ORDER — ERGOCALCIFEROL 1.25 MG/1
CAPSULE ORAL
Qty: 12 CAPSULE | Refills: 3 | Status: SHIPPED | OUTPATIENT
Start: 2023-05-10

## 2023-05-15 ENCOUNTER — TELEPHONE (OUTPATIENT)
Dept: ENDOCRINOLOGY | Facility: CLINIC | Age: 68
End: 2023-05-15
Payer: MEDICARE

## 2023-05-15 NOTE — TELEPHONE ENCOUNTER
Jose at [facility], where the pt is staying called and left a voicemail stating they have noticed that the pt's calves are really swollen and and is wanting to know what Tee thinks should be done about this. Jose said you can call back at [phone].    Thanks

## 2023-05-17 ENCOUNTER — DOCUMENTATION (OUTPATIENT)
Dept: ENDOCRINOLOGY | Facility: CLINIC | Age: 68
End: 2023-05-17
Payer: MEDICARE

## 2023-05-19 ENCOUNTER — TELEPHONE (OUTPATIENT)
Dept: ENDOCRINOLOGY | Facility: CLINIC | Age: 68
End: 2023-05-19
Payer: MEDICARE

## 2023-05-19 NOTE — TELEPHONE ENCOUNTER
"Pt called, stating [facility] is needing an updated script for for his Dexcom system. I asked where it needed to be sent, and he replied with \"send it to [facility]; it doesn't go to a pharmacy.\"    Pt callback number [phone]  "

## 2023-05-30 DIAGNOSIS — E10.3393 TYPE 1 DIABETES MELLITUS WITH MODERATE NONPROLIFERATIVE RETINOPATHY OF BOTH EYES WITHOUT MACULAR EDEMA: ICD-10-CM

## 2023-05-30 RX ORDER — ACETAMINOPHEN, DEXTROMETHORPHAN HBR, DOXYLAMINE SUCCINATE 650; 30; 12.5 MG/30ML; MG/30ML; MG/30ML
LIQUID ORAL
Qty: 100 EACH | Refills: 0 | Status: SHIPPED | OUTPATIENT
Start: 2023-05-30

## 2023-06-01 ENCOUNTER — OFFICE VISIT (OUTPATIENT)
Dept: INTERNAL MEDICINE | Facility: CLINIC | Age: 68
End: 2023-06-01

## 2023-06-01 ENCOUNTER — HOSPITAL ENCOUNTER (OUTPATIENT)
Dept: ULTRASOUND IMAGING | Facility: HOSPITAL | Age: 68
Discharge: HOME | End: 2023-06-01
Admitting: NURSE PRACTITIONER

## 2023-06-01 VITALS
TEMPERATURE: 97.3 F | DIASTOLIC BLOOD PRESSURE: 68 MMHG | OXYGEN SATURATION: 98 % | HEART RATE: 95 BPM | HEIGHT: 72 IN | BODY MASS INDEX: 23.03 KG/M2 | SYSTOLIC BLOOD PRESSURE: 110 MMHG | WEIGHT: 170 LBS

## 2023-06-01 DIAGNOSIS — R60.0 BILATERAL LOWER EXTREMITY EDEMA: Primary | ICD-10-CM

## 2023-06-01 DIAGNOSIS — R60.0 BILATERAL LOWER EXTREMITY EDEMA: ICD-10-CM

## 2023-06-01 PROCEDURE — 99213 OFFICE O/P EST LOW 20 MIN: CPT | Performed by: NURSE PRACTITIONER

## 2023-06-01 PROCEDURE — 3051F HG A1C>EQUAL 7.0%<8.0%: CPT | Performed by: NURSE PRACTITIONER

## 2023-06-01 PROCEDURE — 1159F MED LIST DOCD IN RCRD: CPT | Performed by: NURSE PRACTITIONER

## 2023-06-01 PROCEDURE — 1160F RVW MEDS BY RX/DR IN RCRD: CPT | Performed by: NURSE PRACTITIONER

## 2023-06-01 PROCEDURE — 93970 EXTREMITY STUDY: CPT

## 2023-06-01 NOTE — PROGRESS NOTES
Venous doppler ultrasound negative for DVT. Patient needs to try compression stockings for his swelling, these were prescribed at his office visit today.

## 2023-06-01 NOTE — PROGRESS NOTES
Subjective     Chief Complaint:  Bilateral lower extremity edema    HPI:  The patient presents to the office today with a 10 to 14-day history of lower extremity edema.  He has had bilateral swelling, is unsure if one of his legs has been more swollen than the other.  He states the swelling has been about the same since the onset, has not noted any worsening of this.  He has noted no numbness or tingling to his lower extremities worse than baseline.  He denies calf pain.  He denies fever or chills.  He denies noticing any discoloration or redness to his skin.  He denies any shortness of breath, orthopnea, paroxysmal nocturnal dyspnea.    The patient states his nephew suggested elevating his feet in his recliner so he has been doing this for the last 3 days.  He is unsure if he has noticed any improvement since doing this. The patient tells me that he drinks sugar-free tea on a daily basis and drinks virtually no water.  He feels his appetite is okay but he has been trying to eat smaller portions.  He has lost 9 pounds since March.    Patient's PMR from outside medical facility reviewed and noted.    Past Medical History:   Past Medical History:   Diagnosis Date    Cataract     Diabetes mellitus     Falls     Gait abnormality     GERD (gastroesophageal reflux disease)     Hypothyroidism     Muscle spasm     Nicotine dependence     Rhabdomyolysis     Tremor      Past Surgical History:  Past Surgical History:   Procedure Laterality Date    OTHER SURGICAL HISTORY      cyst on tailbone, left big toe     Social History:  reports that he has been smoking cigarettes. He has been smoking an average of .75 packs per day. He has never used smokeless tobacco. Alcohol use questions deferred to the physician. Drug use questions deferred to the physician.    Family History: family history includes Alzheimer's disease in his father; No Known Problems in his mother.      Allergies:  No Known Allergies  Medications:  Prior to  Admission medications    Medication Sig Start Date End Date Taking? Authorizing Provider   acetaminophen (TYLENOL) 325 MG tablet Take 2 tablets by mouth Every 4 (Four) Hours.   Yes Provider, MD Deanna   aspirin 81 MG EC tablet Take 1 tablet by mouth Daily. 12/1/22  Yes Diana Luz APRN   Continuous Blood Gluc  (Dexcom G6 ) device 1 each Take As Directed. 2/28/22  Yes Kesha Del Cid APRN   Continuous Blood Gluc Sensor (Dexcom G6 Sensor) Every 10 (Ten) Days. 7/23/21  Yes Raissa Olivera APRN   Continuous Blood Gluc Transmit (Dexcom G6 Transmitter) misc 1 each Take As Directed. 2/28/22  Yes Kesha Del Cid APRN   Glucagon (Baqsimi One Pack) 3 MG/DOSE powder 1 each into the nostril(s) as directed by provider As Needed (Hypoglycemia). Apply intranasal if hypoglycemia 3/31/23  Yes Tee Gonsales APRN   ibuprofen (ADVIL,MOTRIN) 200 MG tablet Take 3 tablets by mouth Every 6 (Six) Hours As Needed for Moderate Pain . 7/23/21  Yes Raissa Olivera APRN   insulin aspart (NovoLOG FlexPen) 100 UNIT/ML solution pen-injector sc pen 4 up to 6 untis plus meals sliding scale:151-200-2 UNITS 201-250 -4 UNITS 251-300-6 UNITS 301-350-8 UNITS 351-400-10 UNITS 401 +-12 UNITS 8/15/22  Yes Tee Gonsales APRN   Insulin Glargine (Lantus SoloStar) 100 UNIT/ML injection pen Inject 10 Units under the skin into the appropriate area as directed Every Night.  Patient taking differently: Inject 11 Units under the skin into the appropriate area as directed Every Morning. 8/15/22  Yes Tee Gonsales APRN   levothyroxine (SYNTHROID, LEVOTHROID) 175 MCG tablet TAKE ONE TABLET BY MOUTH EVERY DAY 1/23/23  Yes Diana Luz APRN   tamsulosin (FLOMAX) 0.4 MG capsule 24 hr capsule Take 1 capsule by mouth Every Night. 8/2/22  Yes Mendoza Alberts PA   Unifine Pentips Plus 32G X 4 MM misc USE AS DIRECTED WITH LANTUS AND NOVOLOG 5/30/23  Yes Tee Gonsales, APRN  "  vitamin D (ERGOCALCIFEROL) 1.25 MG (15933 UT) capsule capsule TAKE ONE CAPSULE BY MOUTH EVERY WEEK ON WEDNESDAY 5/10/23  Yes Diana Luz APRN   denosumab (PROLIA) 60 MG/ML solution prefilled syringe syringe Inject 1 mL under the skin into the appropriate area as directed 1 (One) Time for 1 dose. 8/13/21 8/13/21  Kesha Del Cid APRN       Objective     Vital Signs: /68 (BP Location: Left arm, Patient Position: Sitting, Cuff Size: Adult)   Pulse 95   Temp 97.3 °F (36.3 °C) (Temporal)   Ht 182.9 cm (72\")   Wt 77.1 kg (170 lb)   SpO2 98%   BMI 23.06 kg/m²   Physical Exam  Vitals and nursing note reviewed.   Constitutional:       General: He is not in acute distress.     Appearance: He is not ill-appearing or toxic-appearing.   HENT:      Head: Normocephalic and atraumatic.      Mouth/Throat:      Mouth: Mucous membranes are moist.      Pharynx: Oropharynx is clear.   Cardiovascular:      Rate and Rhythm: Normal rate and regular rhythm.      Pulses: Normal pulses.      Heart sounds: Normal heart sounds.   Pulmonary:      Effort: Pulmonary effort is normal.      Breath sounds: No wheezing, rhonchi or rales.   Abdominal:      General: Bowel sounds are normal. There is no distension.      Palpations: Abdomen is soft.      Tenderness: There is no abdominal tenderness.   Musculoskeletal:         General: No swelling or tenderness. Normal range of motion.      Cervical back: Normal range of motion and neck supple. No tenderness.      Right lower leg: Edema present.      Left lower leg: Edema present.      Comments: Circumference of the right calf is 27 cm and circumference of the left calf is 30 cm.   Skin:     General: Skin is warm and dry.      Findings: No erythema or rash.   Neurological:      General: No focal deficit present.      Mental Status: He is alert and oriented to person, place, and time.   Psychiatric:         Mood and Affect: Mood normal.         Behavior: Behavior normal.   "       Thought Content: Thought content normal.         Judgment: Judgment normal.     BMI is within normal parameters. No other follow-up for BMI required.    Assessment / Plan     Assessment/Plan:  Diagnoses and all orders for this visit:    1. Bilateral lower extremity edema (Primary)  -     US Venous Doppler Lower Extremity Bilateral (duplex); Future  -     Compression Stockings       Patient presents to the office today with 10 to 14 days of lower extremity edema.  He does have asymmetrical edema noted on exam with the left calf measuring 3 cm larger than the right calf.  He does not have calf erythema, warmth, or palpable tenderness along the venous system.  He is fairly sedentary and is not very mobile.  We will perform a venous Doppler ultrasound at this time to rule out DVT and will ask for venous reflux to be performed as well to assess for venous insufficiency.  The patient does have a longstanding history of tobacco abuse, so this may contribute to venous insufficiency and lower extremity edema.  He is not having any shortness of breath, orthopnea or paroxysmal nocturnal dyspnea.  He has not experienced any weight gain, has actually lost weight since his last appointment.  Would not suspect a cardiac etiology to his symptoms so we will hold on echocardiogram at this time.    I discussed with the patient increasing his water intake. We also discussed not adding salt to his foods, which he has been doing. He can continue elevation of his lower extremities in his recliner.  If venous Doppler ultrasound is negative for DVT, he can utilize compression stockings, which have been prescribed as well.    Return if symptoms worsen or fail to improve, for Next scheduled follow up. unless patient needs to be seen sooner or acute issues arise.    I have discussed the patient results/orders and and plan/recommendation with them at today's visit.      Diana Luz, APRN   06/01/2023

## 2023-06-02 ENCOUNTER — TELEPHONE (OUTPATIENT)
Dept: GASTROENTEROLOGY | Facility: CLINIC | Age: 68
End: 2023-06-02

## 2023-06-02 NOTE — TELEPHONE ENCOUNTER
Called patient today to discuss scheduling Colonoscopy without office appointment. No answer left message for patient to contact the office.     TY

## 2023-06-14 DIAGNOSIS — E10.3393 TYPE 1 DIABETES MELLITUS WITH MODERATE NONPROLIFERATIVE RETINOPATHY OF BOTH EYES WITHOUT MACULAR EDEMA: ICD-10-CM

## 2023-06-14 RX ORDER — INSULIN GLARGINE 100 [IU]/ML
11 INJECTION, SOLUTION SUBCUTANEOUS NIGHTLY
Qty: 15 ML | Refills: 3 | Status: SHIPPED | OUTPATIENT
Start: 2023-06-14

## 2023-06-14 NOTE — TELEPHONE ENCOUNTER
TERRANCE LUNA LPN FROM [facility] [place] FAXED A REQUEST FOR NEW RX  FOR LANTUS 11 UNITS EVERY NIGHT TO BE SENT TO [facility].    RX SENT

## 2023-06-15 ENCOUNTER — OFFICE VISIT (OUTPATIENT)
Dept: INTERNAL MEDICINE | Facility: CLINIC | Age: 68
End: 2023-06-15
Payer: MEDICARE

## 2023-06-15 VITALS
DIASTOLIC BLOOD PRESSURE: 50 MMHG | SYSTOLIC BLOOD PRESSURE: 82 MMHG | HEART RATE: 69 BPM | WEIGHT: 170 LBS | HEIGHT: 72 IN | OXYGEN SATURATION: 95 % | TEMPERATURE: 97.8 F | BODY MASS INDEX: 23.03 KG/M2 | RESPIRATION RATE: 18 BRPM

## 2023-06-15 DIAGNOSIS — I95.0 IDIOPATHIC HYPOTENSION: Primary | ICD-10-CM

## 2023-06-15 DIAGNOSIS — E10.65 TYPE 1 DIABETES MELLITUS WITH HYPERGLYCEMIA: ICD-10-CM

## 2023-06-15 DIAGNOSIS — I87.2 VENOUS INSUFFICIENCY: ICD-10-CM

## 2023-06-15 LAB
EXPIRATION DATE: NORMAL
HBA1C MFR BLD: 7.8 %
Lab: NORMAL

## 2023-06-15 NOTE — PROGRESS NOTES
Subjective     Chief Complaint:  Follow-up on leg swelling    HPI:  Patient presents to the office today for follow-up.  He was last seen on 6/1/2023 with complaints of bilateral lower extremity edema.  He had not been having any calf pain, fever or chills, discoloration or redness of his skin.  We did perform a venous Doppler ultrasound as the left leg was noticeably more edematous than the right, which showed no evidence of DVT.  This does showed some evidence of venous insufficiency/reflux.  Patient does have a history of tobacco abuse.  He states that since his last office visit, he has decreased added salt to his diet as we discussed and this has seemed to help.  He has continued to drink mostly tea, and has not had increased his water intake as directed.    Blood pressure is low in the office today at 82/50.  His blood pressure is normally low however this is lower than normal.  He is asymptomatic with this.  He denies any chest pain, shortness of breath, palpitations.  He denies dizziness or lightheadedness.    The patient does use the Dexcom G6 to monitor blood glucoses.  He was able to show me the summary of his last 24 hours and it appeared that he had spikes over 300 between 5 and 6 AM and between 6 and 7 PM.  The lowest his blood glucose was in the last 24 hours was 120 at midnight.  He does have an upcoming appointment with endocrinology next week.    Patient's PMR from outside medical facility reviewed and noted.    Past Medical History:   Past Medical History:   Diagnosis Date    Cataract     Diabetes mellitus     Falls     Gait abnormality     GERD (gastroesophageal reflux disease)     Hypothyroidism     Muscle spasm     Nicotine dependence     Rhabdomyolysis     Tremor      Past Surgical History:  Past Surgical History:   Procedure Laterality Date    OTHER SURGICAL HISTORY      cyst on tailbone, left big toe     Social History:  reports that he has been smoking cigarettes. He has been  smoking an average of .75 packs per day. He has never used smokeless tobacco. Alcohol use questions deferred to the physician. Drug use questions deferred to the physician.    Family History: family history includes Alzheimer's disease in his father; No Known Problems in his mother.      Allergies:  No Known Allergies  Medications:  Prior to Admission medications    Medication Sig Start Date End Date Taking? Authorizing Provider   aspirin 81 MG EC tablet Take 1 tablet by mouth Daily. 12/1/22  Yes Diana Luz APRN   Continuous Blood Gluc  (Dexcom G6 ) device 1 each Take As Directed. 2/28/22  Yes Kesha Del Cid APRN   Continuous Blood Gluc Sensor (Dexcom G6 Sensor) Every 10 (Ten) Days. 7/23/21  Yes Raissa Olivera APRN   Continuous Blood Gluc Transmit (Dexcom G6 Transmitter) misc 1 each Take As Directed. 2/28/22  Yes Kesha Del Cid APRN   Glucagon (Baqsimi One Pack) 3 MG/DOSE powder 1 each into the nostril(s) as directed by provider As Needed (Hypoglycemia). Apply intranasal if hypoglycemia 3/31/23  Yes Tee Gonsales APRN   ibuprofen (ADVIL,MOTRIN) 200 MG tablet Take 3 tablets by mouth Every 6 (Six) Hours As Needed for Moderate Pain . 7/23/21  Yes Raissa Olivera APRN   insulin aspart (NovoLOG FlexPen) 100 UNIT/ML solution pen-injector sc pen 4 up to 6 untis plus meals sliding scale:151-200-2 UNITS 201-250 -4 UNITS 251-300-6 UNITS 301-350-8 UNITS 351-400-10 UNITS 401 +-12 UNITS 8/15/22  Yes Tee Gonsales APRN   Insulin Glargine (Lantus SoloStar) 100 UNIT/ML injection pen Inject 11 Units under the skin into the appropriate area as directed Every Night. 6/14/23  Yes Tee Gonsales APRN   levothyroxine (SYNTHROID, LEVOTHROID) 175 MCG tablet TAKE ONE TABLET BY MOUTH EVERY DAY 1/23/23  Yes Diana Luz APRN   tamsulosin (FLOMAX) 0.4 MG capsule 24 hr capsule Take 1 capsule by mouth Every Night. 8/2/22  Yes Mendoza Alberts, NIGHAT Muñoz  "Pentips Plus 32G X 4 MM misc USE AS DIRECTED WITH LANTUS AND NOVOLOG 5/30/23  Yes Tee Gonsales APRN   vitamin D (ERGOCALCIFEROL) 1.25 MG (86027 UT) capsule capsule TAKE ONE CAPSULE BY MOUTH EVERY WEEK ON WEDNESDAY 5/10/23  Yes Diana Luz APRN   acetaminophen (TYLENOL) 325 MG tablet Take 2 tablets by mouth Every 4 (Four) Hours.  Patient not taking: Reported on 6/15/2023    Provider, MD Deanna   denosumab (PROLIA) 60 MG/ML solution prefilled syringe syringe Inject 1 mL under the skin into the appropriate area as directed 1 (One) Time for 1 dose. 8/13/21 6/15/23  Kesha Del Cid APRN       Objective     Vital Signs: BP (!) 82/50 (BP Location: Left arm, Patient Position: Sitting, Cuff Size: Adult)   Pulse 69   Temp 97.8 °F (36.6 °C) (Infrared)   Resp 18   Ht 182.9 cm (72\")   Wt 77.1 kg (170 lb)   SpO2 95%   BMI 23.06 kg/m²   Physical Exam  Vitals and nursing note reviewed.   Constitutional:       General: He is not in acute distress.     Appearance: He is not ill-appearing or toxic-appearing.   HENT:      Head: Normocephalic and atraumatic.      Mouth/Throat:      Mouth: Mucous membranes are moist.      Pharynx: Oropharynx is clear.   Eyes:      Conjunctiva/sclera:      Right eye: Right conjunctiva is injected.      Left eye: Left conjunctiva is injected.      Comments: Patient states he got shampoo in his eyes this morning which caused quite a bit of irritation.   Cardiovascular:      Rate and Rhythm: Normal rate and regular rhythm.      Pulses: Normal pulses.      Heart sounds: Normal heart sounds.   Pulmonary:      Effort: Pulmonary effort is normal.      Breath sounds: No wheezing, rhonchi or rales.   Abdominal:      General: Bowel sounds are normal. There is no distension.      Palpations: Abdomen is soft.      Tenderness: There is no abdominal tenderness.   Musculoskeletal:         General: No swelling or tenderness. Normal range of motion.      Cervical back: Normal " range of motion and neck supple. No tenderness.   Skin:     General: Skin is warm and dry.      Findings: No erythema or rash.   Neurological:      General: No focal deficit present.      Mental Status: He is alert and oriented to person, place, and time.   Psychiatric:         Mood and Affect: Mood normal.         Behavior: Behavior normal.         Thought Content: Thought content normal.         Judgment: Judgment normal.       BMI is within normal parameters. No other follow-up for BMI required.    Results Reviewed:  Hemoglobin A1c in the office today 7.8%.    Assessment / Plan     Assessment/Plan:  Diagnoses and all orders for this visit:    1. Idiopathic hypotension (Primary)    2. Type 1 diabetes mellitus with hyperglycemia  -     POC Glycated Hemoglobin, Total    3. Venous insufficiency       Patient presents to the office today for a 3-month follow-up.  His hemoglobin A1c today is 7.8, improved from March at 7.9%.  He does have an upcoming appointment with endocrinology next week, so we will not adjust his medication regimen at this time as he is followed very closely by other service.  I did suggest that he may mention use of an insulin pump rather than multiple injections throughout the day, however he may be better compliant with injections.  His Dexcom for the last 24 hours did show spikes above 300 between 5 and 6 AM and 6 and 7 PM.  His blood glucose was at its lowest around midnight at 120.    The patient was seen on 6/1/2023 for complaints of lower extremity edema.  This was asymmetrical with the left lower extremity larger than the right.  We did perform a venous Doppler ultrasound which was negative for DVT.  Venous reflux was noted and it is felt the patient does have venous insufficiency based on these measurements.  Likely due to longstanding history of tobacco abuse.  Patient is not interested in quitting smoking at this time.  We discussed use of compression stockings as needed.  He has cut  back on added salt to his diet, which has seemed to help with the swelling.  We discussed increasing his water intake and decreasing intake of tea.  Explained that as tea does not contain caffeine this is a natural diuretic, and he expresses understanding.    Blood pressure is lower than baseline today in the office at 82/50.  Patient is asymptomatic with this.  We again discussed increasing his water intake.  We will continue to monitor.  Patient does have his blood pressure monitored periodically at his assisted living facility.    Return in about 6 months (around 12/15/2023) for Recheck. unless patient needs to be seen sooner or acute issues arise.    I have discussed the patient results/orders and and plan/recommendation with them at today's visit.      Diana Luz, ELANA   06/15/2023

## 2023-07-21 NOTE — PROGRESS NOTES
Subjective    Mr. Colbert is 67 y.o. male    Chief Complaint: Kidney Stone Follow Up    History of Present Illness  Patient with prior history of kidney stones is here for annual follow-up he got a KUB prior to his to his appointment today.  He states he has not passed a stone since he was last seen he has had no stone episodes.  Patient also was on tamsulosin with history of incomplete bladder emptying.  His postvoid residual today was 37 mL.  His urine was clear.    The following portions of the patient's history were reviewed and updated as appropriate: allergies, current medications, past family history, past medical history, past social history, past surgical history and problem list.    Review of Systems   Constitutional:  Negative for chills and fever.   Gastrointestinal:  Negative for abdominal pain, anal bleeding and blood in stool.   Genitourinary:  Negative for dysuria and hematuria.       Current Outpatient Medications:     acetaminophen (TYLENOL) 325 MG tablet, Take 2 tablets by mouth Every 4 (Four) Hours., Disp: , Rfl:     aspirin 81 MG EC tablet, Take 1 tablet by mouth Daily., Disp: 180 tablet, Rfl: 1    Continuous Blood Gluc  (Dexcom G6 ) device, 1 each Take As Directed., Disp: 1 each, Rfl: 0    Continuous Blood Gluc Sensor (Dexcom G6 Sensor), Every 10 (Ten) Days., Disp: 9 each, Rfl: 3    Continuous Blood Gluc Transmit (Dexcom G6 Transmitter) misc, 1 each Take As Directed., Disp: 1 each, Rfl: 0    Glucagon (Baqsimi One Pack) 3 MG/DOSE powder, 1 each into the nostril(s) as directed by provider As Needed (Hypoglycemia). Apply intranasal if hypoglycemia, Disp: 2 each, Rfl: 11    ibuprofen (ADVIL,MOTRIN) 200 MG tablet, Take 3 tablets by mouth Every 6 (Six) Hours As Needed for Moderate Pain ., Disp: 500 tablet, Rfl: 3    insulin aspart (NovoLOG FlexPen) 100 UNIT/ML solution pen-injector sc pen, 4 up to 6 untis plus meals sliding scale:151-200-2 UNITS 201-250 -4 UNITS 251-300-6 UNITS  "301-350-8 UNITS 351-400-10 UNITS 401 +-12 UNITS, Disp: 4 pen, Rfl: 11    Insulin Glargine (Lantus SoloStar) 100 UNIT/ML injection pen, Inject 20 Units under the skin into the appropriate area as directed Every Night., Disp: 15 mL, Rfl: 5    Insulin Pen Needle (Unifine Pentips Plus) 32G X 4 MM misc, USE AS DIRECTED WITH LANTUS AND NOVOLOG, Disp: 200 each, Rfl: 0    levothyroxine (SYNTHROID, LEVOTHROID) 175 MCG tablet, TAKE ONE TABLET BY MOUTH EVERY DAY, Disp: 90 tablet, Rfl: 3    tamsulosin (FLOMAX) 0.4 MG capsule 24 hr capsule, Take 1 capsule by mouth Every Night., Disp: 90 capsule, Rfl: 3    vitamin D (ERGOCALCIFEROL) 1.25 MG (37288 UT) capsule capsule, TAKE ONE CAPSULE BY MOUTH EVERY WEEK ON WEDNESDAY, Disp: 12 capsule, Rfl: 3    Past Medical History:   Diagnosis Date    Cataract     Diabetes mellitus     Falls     Gait abnormality     GERD (gastroesophageal reflux disease)     Hypothyroidism     Muscle spasm     Nicotine dependence     Rhabdomyolysis     Tremor        Past Surgical History:   Procedure Laterality Date    OTHER SURGICAL HISTORY      cyst on tailbone, left big toe       Social History     Socioeconomic History    Marital status: Single   Tobacco Use    Smoking status: Every Day     Packs/day: 0.75     Types: Cigarettes    Smokeless tobacco: Never    Tobacco comments:     16-17 cigs a day per pt as of 07/22/2021   Substance and Sexual Activity    Alcohol use: Defer    Drug use: Defer    Sexual activity: Defer       Family History   Problem Relation Age of Onset    No Known Problems Mother     Alzheimer's disease Father        Objective    Temp 97.7 °F (36.5 °C)   Ht 182.9 cm (72\")   Wt 77.1 kg (170 lb)   BMI 23.06 kg/m²     Physical Exam  Vitals reviewed.   Constitutional:       Appearance: Normal appearance.   HENT:      Head: Normocephalic and atraumatic.   Pulmonary:      Effort: Pulmonary effort is normal.   Skin:     Coloration: Skin is not jaundiced or pale.   Neurological:      Mental " Status: He is alert and oriented to person, place, and time.   Psychiatric:         Mood and Affect: Mood normal.         Behavior: Behavior normal.           Results for orders placed or performed in visit on 07/28/23   POC Urinalysis Dipstick, Multipro    Specimen: Urine   Result Value Ref Range    Color Yellow Yellow, Straw, Dark Yellow, Sneha    Clarity, UA Clear Clear    Glucose,  mg/dL (A) Negative mg/dL    Bilirubin Negative Negative    Ketones, UA Negative Negative    Specific Gravity  1.025 1.005 - 1.030    Blood, UA Negative Negative    pH, Urine 7.0 5.0 - 8.0    Protein, POC Trace (A) Negative mg/dL    Urobilinogen, UA 0.2 E.U./dL Normal, 0.2 E.U./dL    Nitrite, UA Negative Negative    Leukocytes Negative Negative     KUB independent review    A KUB is available for me to review today.  The image is inspected for a bowel gas pattern and the general bone structure of the spine and pelvis. The kidneys are then inspected closely.  Renal outline is noted if identifiable. The kidney, collecting system, and anticipated path of the ureter are examined for calcifications including those in the true pelvis.  This film reveals:    On the right there are no calcificaitons seen in the kidney or the expected course of the ureter. .    On the left there are no calcificaitons seen in the kidney or the expected course of the ureter.     Estimation of residual urine via abdominal ultrasound  Residual Urine: 37 ml  Indication: Incomplete Emptying   Position: Supine  Examination: Incremental scanning of the suprapubic area using 3 MHz transducer using copious amounts of acoustic gel.   Findings: An anechoic area was demonstrated which represented the bladder, with measurement of residual urine as noted. I inspected this myself. In that the residual urine was stable or insignificant, no treatment will be necessary at this time.       Assessment and Plan    Diagnoses and all orders for this visit:    1. Kidney stones  (Primary)  -     Cancel: POC Urinalysis Dipstick, Multipro    2. Incomplete bladder emptying  -     Cancel: POC Urinalysis Dipstick, Multipro    No obvious kidney stone seen on the KUB has not had any stone episodes follow-up 6 months KUB prior.    Patient continues tamsulosin.  His bladder scan was 37 mL.  He will follow-up and 6 months KUB prior.

## 2023-07-24 DIAGNOSIS — E10.3393 TYPE 1 DIABETES MELLITUS WITH MODERATE NONPROLIFERATIVE RETINOPATHY OF BOTH EYES WITHOUT MACULAR EDEMA: ICD-10-CM

## 2023-07-24 RX ORDER — ACETAMINOPHEN, DEXTROMETHORPHAN HBR, DOXYLAMINE SUCCINATE 650; 30; 12.5 MG/30ML; MG/30ML; MG/30ML
LIQUID ORAL
Qty: 200 EACH | Refills: 0 | Status: SHIPPED | OUTPATIENT
Start: 2023-07-24

## 2023-07-27 ENCOUNTER — TELEPHONE (OUTPATIENT)
Dept: UROLOGY | Facility: CLINIC | Age: 68
End: 2023-07-27
Payer: MEDICARE

## 2023-07-27 NOTE — TELEPHONE ENCOUNTER
Called Patient to remind them to get KUB prior to appointment.  Left vm with Patient.  If patient calls back it is ok for the HUB to tell the pt the message.

## 2023-07-28 ENCOUNTER — HOSPITAL ENCOUNTER (OUTPATIENT)
Dept: GENERAL RADIOLOGY | Facility: HOSPITAL | Age: 68
Discharge: HOME | End: 2023-07-28
Admitting: PHYSICIAN ASSISTANT
Payer: MEDICARE

## 2023-07-28 ENCOUNTER — OFFICE VISIT (OUTPATIENT)
Dept: UROLOGY | Facility: CLINIC | Age: 68
End: 2023-07-28
Payer: MEDICARE

## 2023-07-28 VITALS — WEIGHT: 170 LBS | HEIGHT: 72 IN | TEMPERATURE: 97.7 F | BODY MASS INDEX: 23.03 KG/M2

## 2023-07-28 DIAGNOSIS — N20.0 KIDNEY STONES: ICD-10-CM

## 2023-07-28 DIAGNOSIS — R33.9 INCOMPLETE BLADDER EMPTYING: ICD-10-CM

## 2023-07-28 DIAGNOSIS — N20.0 KIDNEY STONES: Primary | ICD-10-CM

## 2023-07-28 LAB
BILIRUB BLD-MCNC: NEGATIVE MG/DL
CLARITY, POC: CLEAR
COLOR UR: YELLOW
GLUCOSE UR STRIP-MCNC: ABNORMAL MG/DL
KETONES UR QL: NEGATIVE
LEUKOCYTE EST, POC: NEGATIVE
NITRITE UR-MCNC: NEGATIVE MG/ML
PH UR: 7 [PH] (ref 5–8)
PROT UR STRIP-MCNC: ABNORMAL MG/DL
RBC # UR STRIP: NEGATIVE /UL
SP GR UR: 1.02 (ref 1–1.03)
UROBILINOGEN UR QL: ABNORMAL

## 2023-07-28 PROCEDURE — 74018 RADEX ABDOMEN 1 VIEW: CPT

## 2023-08-01 ENCOUNTER — DOCUMENTATION (OUTPATIENT)
Dept: ENDOCRINOLOGY | Facility: CLINIC | Age: 68
End: 2023-08-01
Payer: MEDICARE

## 2023-08-02 ENCOUNTER — OFFICE VISIT (OUTPATIENT)
Dept: GASTROENTEROLOGY | Facility: CLINIC | Age: 68
End: 2023-08-02
Payer: MEDICARE

## 2023-08-02 VITALS
OXYGEN SATURATION: 95 % | HEIGHT: 72 IN | WEIGHT: 170 LBS | BODY MASS INDEX: 23.03 KG/M2 | TEMPERATURE: 97 F | SYSTOLIC BLOOD PRESSURE: 124 MMHG | HEART RATE: 77 BPM | DIASTOLIC BLOOD PRESSURE: 68 MMHG

## 2023-08-02 DIAGNOSIS — Z12.11 ENCOUNTER FOR SCREENING FOR MALIGNANT NEOPLASM OF COLON: Primary | ICD-10-CM

## 2023-08-02 NOTE — H&P (VIEW-ONLY)
Chief Complaint   Patient presents with    Colonoscopy     Had colon several years ago needs colon having no problems       PCP: Diana Luz APRN  REFER: Diana Luz APRN    Subjective     HPI    Hi Colbert is a 67 y.o. male who presents to office for preventative maintenance.  There is not  a personal history of colon polyps.  There is not a history of colon cancer.  He does not have complaints of nausea/vomiting, change in bowels, weight loss, no BRBPR, no melena.  There is not a family history of colon cancer in first degree relative.  There is not a family history of colon polyps in first degree relative.  Hi Colbert last colonoscopy-apx 30 years ago .  Bowels do not move on regular basis.  Stool is often hard.  He will often feel urge to have bowel movement and not able to pass stool, this is not new.          Lab Results - Last 18 Months   Lab Units 03/20/23  1001 12/27/22  1521 12/02/22  0658 08/15/22  1701 03/15/22  1422   GLUCOSE mg/dL 364* 176* 514* 222* 235*   SODIUM mmol/L 138 140 135* 138 140   POTASSIUM mmol/L 4.5 4.3 5.4* 5.0 5.2   CREATININE mg/dL 0.94 1.21 1.03 0.99 1.23   BUN mg/dL 19 26* 23 25* 23   BUN / CREAT RATIO  20.2 21.5 22.3 25.3* 18.7   ALK PHOS U/L 93  --  91 83 83   ALT (SGPT) U/L 11  --  15 15 16   AST (SGOT) U/L 11  --  14 18 14   BILIRUBIN mg/dL 0.4  --  0.3 0.2 0.2   ALBUMIN g/dL 4.6  --  4.50 3.80 4.10       Lab Results - Last 18 Months   Lab Units 03/20/23  1001 12/27/22  1521 12/02/22  0658 03/15/22  1422   EGFR RESULT mL/min/1.73 88.3 65.2 79.1 64.3        Past Medical History:   Diagnosis Date    Cataract     Diabetes mellitus     Falls     Gait abnormality     GERD (gastroesophageal reflux disease)     Hypothyroidism     Muscle spasm     Nicotine dependence     Rhabdomyolysis     Tremor      Past Surgical History:   Procedure Laterality Date    OTHER SURGICAL HISTORY      cyst on tailbone, left big toe     Outpatient Medications Marked as Taking for  the 8/2/23 encounter (Office Visit) with Bret Martinez APRN   Medication Sig Dispense Refill    acetaminophen (TYLENOL) 325 MG tablet Take 2 tablets by mouth Every 4 (Four) Hours.      aspirin 81 MG EC tablet Take 1 tablet by mouth Daily. 180 tablet 1    Continuous Blood Gluc  (Dexcom G6 ) device 1 each Take As Directed. 1 each 0    Continuous Blood Gluc Transmit (Dexcom G6 Transmitter) misc 1 each Take As Directed. 1 each 0    Glucagon (Baqsimi One Pack) 3 MG/DOSE powder 1 each into the nostril(s) as directed by provider As Needed (Hypoglycemia). Apply intranasal if hypoglycemia 2 each 11    ibuprofen (ADVIL,MOTRIN) 200 MG tablet Take 3 tablets by mouth Every 6 (Six) Hours As Needed for Moderate Pain . 500 tablet 3    insulin aspart (NovoLOG FlexPen) 100 UNIT/ML solution pen-injector sc pen 4 up to 6 untis plus meals sliding scale:151-200-2 UNITS 201-250 -4 UNITS 251-300-6 UNITS 301-350-8 UNITS 351-400-10 UNITS 401 +-12 UNITS 4 pen 11    Insulin Glargine (Lantus SoloStar) 100 UNIT/ML injection pen Inject 20 Units under the skin into the appropriate area as directed Every Night. 15 mL 5    levothyroxine (SYNTHROID, LEVOTHROID) 175 MCG tablet TAKE ONE TABLET BY MOUTH EVERY DAY 90 tablet 3    tamsulosin (FLOMAX) 0.4 MG capsule 24 hr capsule Take 1 capsule by mouth Every Night. 90 capsule 3    vitamin D (ERGOCALCIFEROL) 1.25 MG (04416 UT) capsule capsule TAKE ONE CAPSULE BY MOUTH EVERY WEEK ON WEDNESDAY 12 capsule 3     No Known Allergies  Social History     Socioeconomic History    Marital status: Single   Tobacco Use    Smoking status: Every Day     Packs/day: 1.00     Years: 40.00     Pack years: 40.00     Types: Cigarettes    Smokeless tobacco: Never    Tobacco comments:     16-17 cigs a day per pt as of 07/22/2021   Vaping Use    Vaping Use: Never used   Substance and Sexual Activity    Alcohol use: Never    Drug use: Yes     Types: Marijuana     Comment: sometimes    Sexual activity: Defer      Review of Systems   Constitutional:  Negative for fever and unexpected weight change.   HENT:  Negative for trouble swallowing.    Respiratory:  Negative for shortness of breath.    Cardiovascular:  Negative for chest pain.   Gastrointestinal:  Negative for abdominal pain and anal bleeding.   Objective   Vitals:    08/02/23 1400   BP: 124/68   Pulse: 77   Temp: 97 øF (36.1 øC)   SpO2: 95%     Physical Exam  Constitutional:       Appearance: Normal appearance. He is well-developed.   Eyes:      General: No scleral icterus.  Cardiovascular:      Heart sounds: Normal heart sounds. No murmur heard.  Pulmonary:      Effort: Pulmonary effort is normal.   Abdominal:      General: Bowel sounds are normal. There is no distension.      Palpations: Abdomen is soft.      Tenderness: There is no abdominal tenderness. There is no guarding.   Skin:     General: Skin is warm and dry.      Coloration: Skin is not jaundiced.   Neurological:      Mental Status: He is alert.   Psychiatric:         Behavior: Behavior is cooperative.     Imaging Results (Most Recent)       None          Body mass index is 23.06 kg/mý.    Assessment & Plan   Diagnoses and all orders for this visit:    1. Encounter for screening for malignant neoplasm of colon (Primary)  -     Case Request; Standing  -     Implement Anesthesia Orders Day of Procedure; Standing  -     Obtain Informed Consent; Standing  -     Case Request      COLONOSCOPY WITH ANESTHESIA (N/A)    Recommend daily use of Miralax, adjust as needed  Encouraged addition of dietary fiber, increasing daily water consumption, as well daily physical activity    Miralax prep     Very lengthy discussion regarding risk and benefit of proceeding with colonoscopy  Hi Colbert verbalized understanding of our conversation and elected to proceed with colonoscopy   Encouraged miralax to help facilitate bowel movement     Advised pt to stop use of NSAIDs, Fish Oil, and MV 5 days prior to procedure,  per Dr Del Cid protocol.  Tylenol based products are ok to take.  Pt verbalized understanding.     All risks, benefits, alternatives, and indications of colonoscopy procedure have been discussed with the patient. Risks to include perforation of the colon requiring possible surgery or colostomy, risk of bleeding from biopsies or removal of colon tissue, possibility of missing a colon polyp or cancer, or adverse drug reaction.  Benefits to include the diagnosis and management of disease of the colon and rectum. Alternatives to include barium enema, radiographic evaluation, lab testing or no intervention. He verbalizes understanding and agrees.         Bret Martinez, APRN  08/05/23        There are no Patient Instructions on file for this visit.

## 2023-08-03 PROBLEM — Z12.11 ENCOUNTER FOR SCREENING FOR MALIGNANT NEOPLASM OF COLON: Status: ACTIVE | Noted: 2023-08-03

## 2023-08-08 ENCOUNTER — HOSPITAL ENCOUNTER (OUTPATIENT)
Dept: BONE DENSITY | Facility: HOSPITAL | Age: 68
Discharge: HOME | End: 2023-08-08
Admitting: NURSE PRACTITIONER
Payer: MEDICARE

## 2023-08-08 DIAGNOSIS — M81.0 OSTEOPOROSIS, UNSPECIFIED OSTEOPOROSIS TYPE, UNSPECIFIED PATHOLOGICAL FRACTURE PRESENCE: ICD-10-CM

## 2023-08-08 PROCEDURE — 77080 DXA BONE DENSITY AXIAL: CPT

## 2023-08-09 ENCOUNTER — TELEPHONE (OUTPATIENT)
Dept: ENDOCRINOLOGY | Facility: CLINIC | Age: 68
End: 2023-08-09
Payer: MEDICARE

## 2023-08-09 NOTE — TELEPHONE ENCOUNTER
----- Message from ELANA Jasso sent at 8/8/2023  4:46 PM CDT -----  His bone density did show improvement, the tscore shows ostoepenia but his FRAX does show high risk for fracture so continue the prolia

## 2023-08-16 ENCOUNTER — ANESTHESIA EVENT (OUTPATIENT)
Dept: GASTROENTEROLOGY | Facility: HOSPITAL | Age: 68
End: 2023-08-16
Payer: MEDICARE

## 2023-08-16 ENCOUNTER — ANESTHESIA (OUTPATIENT)
Dept: GASTROENTEROLOGY | Facility: HOSPITAL | Age: 68
End: 2023-08-16
Payer: MEDICARE

## 2023-08-16 ENCOUNTER — OFFICE VISIT (OUTPATIENT)
Dept: INTERNAL MEDICINE | Facility: CLINIC | Age: 68
End: 2023-08-16
Payer: MEDICARE

## 2023-08-16 ENCOUNTER — HOSPITAL ENCOUNTER (OUTPATIENT)
Facility: HOSPITAL | Age: 68
Setting detail: HOSPITAL OUTPATIENT SURGERY
Discharge: HOME | End: 2023-08-16
Attending: INTERNAL MEDICINE | Admitting: INTERNAL MEDICINE
Payer: MEDICARE

## 2023-08-16 ENCOUNTER — TELEPHONE (OUTPATIENT)
Dept: GASTROENTEROLOGY | Facility: CLINIC | Age: 68
End: 2023-08-16
Payer: MEDICARE

## 2023-08-16 VITALS
TEMPERATURE: 97 F | RESPIRATION RATE: 13 BRPM | SYSTOLIC BLOOD PRESSURE: 67 MMHG | OXYGEN SATURATION: 96 % | DIASTOLIC BLOOD PRESSURE: 46 MMHG | BODY MASS INDEX: 23.03 KG/M2 | WEIGHT: 170 LBS | HEART RATE: 67 BPM | HEIGHT: 72 IN

## 2023-08-16 VITALS
SYSTOLIC BLOOD PRESSURE: 88 MMHG | HEIGHT: 72 IN | BODY MASS INDEX: 23.03 KG/M2 | TEMPERATURE: 97.1 F | HEART RATE: 69 BPM | OXYGEN SATURATION: 98 % | DIASTOLIC BLOOD PRESSURE: 52 MMHG | WEIGHT: 170 LBS

## 2023-08-16 DIAGNOSIS — L23.9 ALLERGIC CONTACT DERMATITIS, UNSPECIFIED TRIGGER: Primary | ICD-10-CM

## 2023-08-16 DIAGNOSIS — Z12.11 ENCOUNTER FOR SCREENING FOR MALIGNANT NEOPLASM OF COLON: ICD-10-CM

## 2023-08-16 LAB — GLUCOSE BLDC GLUCOMTR-MCNC: 288 MG/DL (ref 70–130)

## 2023-08-16 PROCEDURE — 82948 REAGENT STRIP/BLOOD GLUCOSE: CPT

## 2023-08-16 PROCEDURE — 45385 COLONOSCOPY W/LESION REMOVAL: CPT | Performed by: INTERNAL MEDICINE

## 2023-08-16 PROCEDURE — 25010000002 PROPOFOL 10 MG/ML EMULSION: Performed by: NURSE ANESTHETIST, CERTIFIED REGISTERED

## 2023-08-16 PROCEDURE — 88305 TISSUE EXAM BY PATHOLOGIST: CPT | Performed by: INTERNAL MEDICINE

## 2023-08-16 RX ORDER — SODIUM CHLORIDE 0.9 % (FLUSH) 0.9 %
10 SYRINGE (ML) INJECTION AS NEEDED
Status: DISCONTINUED | OUTPATIENT
Start: 2023-08-16 | End: 2023-08-16 | Stop reason: HOSPADM

## 2023-08-16 RX ORDER — TRIAMCINOLONE ACETONIDE 40 MG/ML
40 INJECTION, SUSPENSION INTRA-ARTICULAR; INTRAMUSCULAR ONCE
Status: COMPLETED | OUTPATIENT
Start: 2023-08-16 | End: 2023-08-16

## 2023-08-16 RX ORDER — SODIUM CHLORIDE 9 MG/ML
1000 INJECTION, SOLUTION INTRAVENOUS CONTINUOUS
Status: DISCONTINUED | OUTPATIENT
Start: 2023-08-16 | End: 2023-08-16 | Stop reason: HOSPADM

## 2023-08-16 RX ORDER — LIDOCAINE HYDROCHLORIDE 10 MG/ML
0.5 INJECTION, SOLUTION EPIDURAL; INFILTRATION; INTRACAUDAL; PERINEURAL ONCE AS NEEDED
Status: DISCONTINUED | OUTPATIENT
Start: 2023-08-16 | End: 2023-08-16 | Stop reason: HOSPADM

## 2023-08-16 RX ORDER — SODIUM CHLORIDE 9 MG/ML
500 INJECTION, SOLUTION INTRAVENOUS CONTINUOUS PRN
Status: DISCONTINUED | OUTPATIENT
Start: 2023-08-16 | End: 2023-08-16 | Stop reason: HOSPADM

## 2023-08-16 RX ORDER — LIDOCAINE HYDROCHLORIDE 20 MG/ML
INJECTION, SOLUTION EPIDURAL; INFILTRATION; INTRACAUDAL; PERINEURAL AS NEEDED
Status: DISCONTINUED | OUTPATIENT
Start: 2023-08-16 | End: 2023-08-16 | Stop reason: SURG

## 2023-08-16 RX ORDER — LORATADINE 10 MG/1
10 TABLET ORAL DAILY
Qty: 7 TABLET | Refills: 0 | Status: SHIPPED | OUTPATIENT
Start: 2023-08-16 | End: 2023-08-17 | Stop reason: SDUPTHER

## 2023-08-16 RX ORDER — SODIUM CHLORIDE 9 MG/ML
100 INJECTION, SOLUTION INTRAVENOUS CONTINUOUS
Status: DISCONTINUED | OUTPATIENT
Start: 2023-08-16 | End: 2023-08-16 | Stop reason: HOSPADM

## 2023-08-16 RX ORDER — SODIUM CHLORIDE 9 MG/ML
40 INJECTION, SOLUTION INTRAVENOUS AS NEEDED
Status: DISCONTINUED | OUTPATIENT
Start: 2023-08-16 | End: 2023-08-16 | Stop reason: HOSPADM

## 2023-08-16 RX ORDER — PROPOFOL 10 MG/ML
VIAL (ML) INTRAVENOUS AS NEEDED
Status: DISCONTINUED | OUTPATIENT
Start: 2023-08-16 | End: 2023-08-16 | Stop reason: SURG

## 2023-08-16 RX ORDER — SODIUM CHLORIDE 0.9 % (FLUSH) 0.9 %
10 SYRINGE (ML) INJECTION EVERY 12 HOURS SCHEDULED
Status: DISCONTINUED | OUTPATIENT
Start: 2023-08-16 | End: 2023-08-16 | Stop reason: HOSPADM

## 2023-08-16 RX ADMIN — SODIUM CHLORIDE 500 ML: 9 INJECTION, SOLUTION INTRAVENOUS at 08:59

## 2023-08-16 RX ADMIN — PROPOFOL INJECTABLE EMULSION 200 MG: 10 INJECTION, EMULSION INTRAVENOUS at 11:56

## 2023-08-16 RX ADMIN — LIDOCAINE HYDROCHLORIDE 100 MG: 20 INJECTION, SOLUTION EPIDURAL; INFILTRATION; INTRACAUDAL; PERINEURAL at 11:56

## 2023-08-16 RX ADMIN — TRIAMCINOLONE ACETONIDE 40 MG: 40 INJECTION, SUSPENSION INTRA-ARTICULAR; INTRAMUSCULAR at 16:16

## 2023-08-16 NOTE — ANESTHESIA POSTPROCEDURE EVALUATION
"Patient: Hi Colbert    Procedure Summary       Date: 08/16/23 Room / Location: [facility] ENDOSCOPY 5 / [facility] ENDOSCOPY    Anesthesia Start: 1152 Anesthesia Stop: 1207    Procedure: COLONOSCOPY WITH ANESTHESIA Diagnosis:       Encounter for screening for malignant neoplasm of colon      (Encounter for screening for malignant neoplasm of colon [Z12.11])    Surgeons: Ryley Del Cid DO Provider: Liana Elliott CRNA    Anesthesia Type: MAC ASA Status: 3            Anesthesia Type: MAC    Vitals  Vitals Value Taken Time   BP 77/57 08/16/23 1231   Temp     Pulse 75 08/16/23 1232   Resp 13 08/16/23 1220   SpO2 96 % 08/16/23 1232   Vitals shown include unvalidated device data.        Post Anesthesia Care and Evaluation    Patient location during evaluation: PACU  Patient participation: complete - patient participated  Level of consciousness: awake and alert  Pain management: adequate    Airway patency: patent  Anesthetic complications: No anesthetic complications    Cardiovascular status: acceptable  Respiratory status: acceptable  Hydration status: acceptable    Comments: Blood pressure (!) 67/46, pulse 67, temperature 97 øF (36.1 øC), temperature source Temporal, resp. rate 13, height 182.9 cm (72\"), weight 77.1 kg (170 lb), SpO2 96 %.    Pt discharged from PACU based on trell score >8    "

## 2023-08-16 NOTE — ANESTHESIA PREPROCEDURE EVALUATION
Anesthesia Evaluation     Patient summary reviewed   no history of anesthetic complications:   NPO Solid Status: > 6 hours             Airway   Mallampati: II  TM distance: >3 FB  Dental - normal exam     Pulmonary    (+) a smoker Current,  (-) sleep apnea, no home oxygen  Cardiovascular   Exercise tolerance: poor (<4 METS)    (-) past MI, cardiac stents, CABG      Neuro/Psych  (+) CVA residual symptoms, dizziness/light headedness, tremors, numbness  (-) seizures  GI/Hepatic/Renal/Endo    (+) GERD, diabetes mellitus using insulin, thyroid problem hypothyroidism    Musculoskeletal     Abdominal    Substance History   (+) drug use     OB/GYN          Other                        Anesthesia Plan    ASA 3     MAC     (wheelchair)  intravenous induction     Anesthetic plan, risks, benefits, and alternatives have been provided, discussed and informed consent has been obtained with: patient and sibling.      CODE STATUS:

## 2023-08-17 DIAGNOSIS — L23.9 ALLERGIC CONTACT DERMATITIS, UNSPECIFIED TRIGGER: ICD-10-CM

## 2023-08-17 LAB
CYTO UR: NORMAL
LAB AP CASE REPORT: NORMAL
Lab: NORMAL
PATH REPORT.FINAL DX SPEC: NORMAL
PATH REPORT.GROSS SPEC: NORMAL

## 2023-08-17 RX ORDER — LORATADINE 10 MG/1
10 TABLET ORAL DAILY
Qty: 7 TABLET | Refills: 0 | Status: SHIPPED | OUTPATIENT
Start: 2023-08-17 | End: 2023-08-24

## 2023-08-17 NOTE — TELEPHONE ENCOUNTER
Pt is requesting medication be sent to [facility] instead of the other pharmacy.      loratadine (Claritin) 10 MG tablet       triamcinolone (KENALOG) 0.1 % ointment

## 2023-08-22 DIAGNOSIS — E10.3393 TYPE 1 DIABETES MELLITUS WITH MODERATE NONPROLIFERATIVE RETINOPATHY OF BOTH EYES WITHOUT MACULAR EDEMA: ICD-10-CM

## 2023-08-22 RX ORDER — INSULIN ASPART 100 [IU]/ML
INJECTION, SOLUTION INTRAVENOUS; SUBCUTANEOUS
Qty: 15 ML | Refills: 0 | Status: SHIPPED | OUTPATIENT
Start: 2023-08-22

## 2023-09-25 DIAGNOSIS — E10.3393 TYPE 1 DIABETES MELLITUS WITH MODERATE NONPROLIFERATIVE RETINOPATHY OF BOTH EYES WITHOUT MACULAR EDEMA: ICD-10-CM

## 2023-09-25 RX ORDER — CALCIUM CITRATE/VITAMIN D3 200MG-6.25
TABLET ORAL
Qty: 50 EACH | Refills: 3 | Status: SHIPPED | OUTPATIENT
Start: 2023-09-25

## 2023-09-25 RX ORDER — ACETAMINOPHEN, DEXTROMETHORPHAN HBR, DOXYLAMINE SUCCINATE 650; 30; 12.5 MG/30ML; MG/30ML; MG/30ML
LIQUID ORAL
Qty: 200 EACH | Refills: 0 | Status: SHIPPED | OUTPATIENT
Start: 2023-09-25

## 2023-12-06 DIAGNOSIS — E03.9 ACQUIRED HYPOTHYROIDISM: ICD-10-CM

## 2023-12-06 RX ORDER — LEVOTHYROXINE SODIUM 175 UG/1
TABLET ORAL
Qty: 90 TABLET | Refills: 3 | OUTPATIENT
Start: 2023-12-06

## 2023-12-06 NOTE — TELEPHONE ENCOUNTER
Will wait to fill until after his next appt, he should still have plenty, given refills for a year in January

## 2023-12-13 ENCOUNTER — OFFICE VISIT (OUTPATIENT)
Dept: INTERNAL MEDICINE | Facility: CLINIC | Age: 68
End: 2023-12-13
Payer: MEDICARE

## 2023-12-13 VITALS
WEIGHT: 172 LBS | HEIGHT: 72 IN | TEMPERATURE: 98.1 F | DIASTOLIC BLOOD PRESSURE: 46 MMHG | BODY MASS INDEX: 23.3 KG/M2 | HEART RATE: 84 BPM | SYSTOLIC BLOOD PRESSURE: 82 MMHG | OXYGEN SATURATION: 95 %

## 2023-12-13 DIAGNOSIS — E03.9 ACQUIRED HYPOTHYROIDISM: ICD-10-CM

## 2023-12-13 DIAGNOSIS — I95.0 IDIOPATHIC HYPOTENSION: ICD-10-CM

## 2023-12-13 DIAGNOSIS — E10.649 TYPE 1 DIABETES MELLITUS WITH HYPOGLYCEMIA AND WITHOUT COMA: Primary | ICD-10-CM

## 2023-12-13 LAB
EXPIRATION DATE: ABNORMAL
HBA1C MFR BLD: 7.6 % (ref 4.5–5.7)
Lab: ABNORMAL

## 2023-12-14 LAB
BUN SERPL-MCNC: 24 MG/DL (ref 8–23)
BUN/CREAT SERPL: 19.8 (ref 7–25)
CALCIUM SERPL-MCNC: 10 MG/DL (ref 8.6–10.5)
CHLORIDE SERPL-SCNC: 104 MMOL/L (ref 98–107)
CO2 SERPL-SCNC: 28.5 MMOL/L (ref 22–29)
CREAT SERPL-MCNC: 1.21 MG/DL (ref 0.76–1.27)
EGFRCR SERPLBLD CKD-EPI 2021: 64.8 ML/MIN/1.73
GLUCOSE SERPL-MCNC: 92 MG/DL (ref 65–99)
POTASSIUM SERPL-SCNC: 4.3 MMOL/L (ref 3.5–5.2)
SODIUM SERPL-SCNC: 141 MMOL/L (ref 136–145)
TSH SERPL DL<=0.005 MIU/L-ACNC: 2.24 UIU/ML (ref 0.27–4.2)

## 2023-12-14 NOTE — PROGRESS NOTES
Subjective     Chief Complaint:  Tired    HPI:  Patient presents to the office today accompanied by his brother.  He states he has been feeling a little tired lately, but has not been sleeping very well some nights.  He otherwise denies any complaints or concerns today.  Please see assessment and plan below.    Patient's PMR from outside medical facility reviewed and noted.    Past Medical History:   Past Medical History:   Diagnosis Date    Cataract     Diabetes mellitus     Falls     Gait abnormality     GERD (gastroesophageal reflux disease)     Hypothyroidism     Muscle spasm     Nicotine dependence     Rhabdomyolysis     Tremor      Past Surgical History:  Past Surgical History:   Procedure Laterality Date    COLONOSCOPY N/A 8/16/2023    Procedure: COLONOSCOPY WITH ANESTHESIA;  Surgeon: Ryley Del Cid DO;  Location: [facility] ENDOSCOPY;  Service: Gastroenterology;  Laterality: N/A;  Pre: Encounter for screening for malignant neoplasm of colon;  Post: Sub optimal prep, polyps;  Dr. Jurado    OTHER SURGICAL HISTORY      cyst on tailbone, left big toe     Social History:  reports that he has been smoking cigarettes. He started smoking about 53 years ago. He has a 30.00 pack-year smoking history. He has never used smokeless tobacco. He reports current drug use. Drug: Marijuana. He reports that he does not drink alcohol.    Family History: family history includes Alzheimer's disease in his father; No Known Problems in his mother.      Allergies:  No Known Allergies  Medications:  Prior to Admission medications    Medication Sig Start Date End Date Taking? Authorizing Provider   acetaminophen (TYLENOL) 325 MG tablet Take 2 tablets by mouth Every 4 (Four) Hours.   Yes Provider, MD Deanna   aspirin 81 MG EC tablet Take 1 tablet by mouth Daily. 12/1/22  Yes Diana Luz APRN   Continuous Blood Gluc  (Dexcom G6 ) device 1 each Take As Directed. 2/28/22  Yes Kesha Del Cid  ELANA   Continuous Blood Gluc Sensor (Dexcom G6 Sensor) Every 10 (Ten) Days. 7/23/21  Yes Raissa Olivera APRN   Continuous Blood Gluc Transmit (Dexcom G6 Transmitter) misc 1 each Take As Directed. 2/28/22  Yes Kesha Del Cid APRN   glucose blood (True Metrix Blood Glucose Test) test strip USE AS DIRECTED 9/25/23  Yes Tee Gonsales APRN   ibuprofen (ADVIL,MOTRIN) 200 MG tablet Take 3 tablets by mouth Every 6 (Six) Hours As Needed for Moderate Pain . 7/23/21  Yes Raissa Olivera APRN   insulin aspart (NovoLOG FlexPen) 100 UNIT/ML solution pen-injector sc pen INJECT 4 UNITS W/ BRKFAST & LUNCH & 5 UNITS W/ SUPPER. MAY USE MAX 12 UNITS PER SSI WITH MEALS 8/22/23  Yes Tee Gonsales APRN   Insulin Glargine (Lantus SoloStar) 100 UNIT/ML injection pen Inject 20 Units under the skin into the appropriate area as directed Every Night.  Patient taking differently: Inject 12 Units under the skin into the appropriate area as directed Daily. 6/21/23  Yes Tee Gonsales APRN   levothyroxine (SYNTHROID, LEVOTHROID) 175 MCG tablet TAKE ONE TABLET BY MOUTH EVERY DAY 1/23/23  Yes Diana Luz APRN   tamsulosin (FLOMAX) 0.4 MG capsule 24 hr capsule Take 1 capsule by mouth Every Night. 7/13/23  Yes Mendoza Alberts PA   Unifine Pentips Plus 32G X 4 MM misc USE AS DIRECTED WITH LANTUS AND NOVOLOG 9/25/23  Yes Tee Gnosales APRN   vitamin D (ERGOCALCIFEROL) 1.25 MG (50729 UT) capsule capsule TAKE ONE CAPSULE BY MOUTH EVERY WEEK ON WEDNESDAY 5/10/23  Yes Diana Luz APRN   Glucagon (Baqsimi One Pack) 3 MG/DOSE powder 1 each into the nostril(s) as directed by provider As Needed (Hypoglycemia). Apply intranasal if hypoglycemia 3/31/23 12/13/23 Yes Tee Gonsales APRN   loratadine (Claritin) 10 MG tablet Take 1 tablet by mouth Daily for 7 days. 8/17/23 12/13/23  Diana Luz, APRN       Objective     Vital Signs: BP (!) 82/46 (BP Location: Left arm, Patient  "Position: Sitting)   Pulse 84   Temp 98.1 °F (36.7 °C) (Temporal)   Ht 182.9 cm (72.01\")   Wt 78 kg (172 lb)   SpO2 95%   BMI 23.32 kg/m²   Physical Exam  Vitals and nursing note reviewed.   Constitutional:       General: He is not in acute distress.     Appearance: He is not ill-appearing or toxic-appearing.      Comments: Sitting up in wheelchair   HENT:      Head: Normocephalic and atraumatic.      Mouth/Throat:      Mouth: Mucous membranes are moist.      Pharynx: Oropharynx is clear.   Cardiovascular:      Rate and Rhythm: Normal rate and regular rhythm.      Pulses: Normal pulses.      Heart sounds: Normal heart sounds.   Pulmonary:      Effort: Pulmonary effort is normal.      Breath sounds: No wheezing, rhonchi or rales.   Abdominal:      General: Bowel sounds are normal. There is no distension.      Palpations: Abdomen is soft.      Tenderness: There is no abdominal tenderness.   Musculoskeletal:         General: No swelling or tenderness. Normal range of motion.      Cervical back: Normal range of motion and neck supple. No tenderness.   Skin:     General: Skin is warm and dry.      Findings: No erythema or rash.   Neurological:      General: No focal deficit present.      Mental Status: He is alert and oriented to person, place, and time.   Psychiatric:         Mood and Affect: Mood normal.         Behavior: Behavior normal.         Thought Content: Thought content normal.         Judgment: Judgment normal.       BMI is within normal parameters. No other follow-up for BMI required.    Results Reviewed:  POC Glycated Hemoglobin, Total (12/13/2023 14:13)     Assessment / Plan     Assessment/Plan:  Diagnoses and all orders for this visit:    1. Type 1 diabetes mellitus with hypoglycemia and without coma (Primary)  -     POC Glycated Hemoglobin, Total  -     Basic metabolic panel    2. Acquired hypothyroidism  -     TSH Rfx On Abnormal To Free T4    3. Idiopathic hypotension  -     Basic metabolic " panel  -     TSH Rfx On Abnormal To Free T4       Patient presents to the office today for a follow-up.  His hemoglobin A1c has shown some improvement from June at 7.8-7.6 today.  He has not changed his insulin regimen.  He is still utilizing 12 units of Lantus in the morning, and NovoLog 4 units with breakfast and lunch and 5 units with dinner.  He is following up with endocrinology next week.  I did review his Dexcom G6 which does show some wide variation in his glucose over the last 24 hours.  This morning around 8 AM, his glucose was 368 and at the time I am seeing him in the office it is 92.  He denies any changes with his diet.    The patient's blood pressure is quite low once again in the office today, at 82/46.  He does not have this regularly checked at his assisted living facility, he states around once per month or every other month.  He does not seem to be having any symptoms with this.  He denies any headache, chest pain, shortness of breath, dizziness/lightheadedness.  He states the only time he feels dizzy is if he reaches for something above his head.  He is quite sedentary, which may be contributory.  When he is not in a chair, he does use a walker but his brother states he does not walk much throughout the day.  As this is not new for the patient and he is not having symptoms, we will continue to monitor closely.  I would like to check a BMP and TSH today to assess for any underlying causes.  We have previously discussed drinking more water to stay well-hydrated, which he has not been doing.  We discussed the importance of this.  He indicates that he typically only drinks tea or sometimes sugar-free drink mixes throughout the day    Return in about 15 weeks (around 3/27/2024) for Medicare Wellness. unless patient needs to be seen sooner or acute issues arise.    I have discussed the patient results/orders and and plan/recommendation with them at today's visit.      ELANA Rodriguez    12/13/2023

## 2023-12-14 NOTE — PROGRESS NOTES
Kidney function and electrolytes look stable. Thyroid function is normal, continue current medications.

## 2023-12-20 DIAGNOSIS — E03.9 ACQUIRED HYPOTHYROIDISM: ICD-10-CM

## 2023-12-20 RX ORDER — LEVOTHYROXINE SODIUM 175 UG/1
TABLET ORAL
Qty: 90 TABLET | Refills: 3 | Status: SHIPPED | OUTPATIENT
Start: 2023-12-20

## 2024-01-16 NOTE — PROGRESS NOTES
Subjective    Mr. Colbert is 68 y.o. male    Chief Complaint: Kidney Stone     History of Present Illness  Patient is a 68-year-old gentleman who presents for 6-month follow-up he has a history of incomplete bladder emptying he has done well on tamsulosin he does not need any refills today.  Not able to provide a urine specimen today had voided prior to his appointment.    Also has prior history of kidney stones previous KUB was negative KUB today shows no obvious calcifications did show moderate constipation any does say that he is constipated.    The following portions of the patient's history were reviewed and updated as appropriate: allergies, current medications, past family history, past medical history, past social history, past surgical history and problem list.    Review of Systems   Constitutional:  Negative for chills and fever.   Gastrointestinal:  Negative for abdominal pain, anal bleeding and blood in stool.   Genitourinary:  Negative for dysuria and hematuria.         Current Outpatient Medications:     acetaminophen (TYLENOL) 325 MG tablet, Take 2 tablets by mouth Every 4 (Four) Hours., Disp: , Rfl:     aspirin 81 MG EC tablet, Take 1 tablet by mouth Daily., Disp: 180 tablet, Rfl: 1    Continuous Blood Gluc  (Dexcom G6 ) device, 1 each Take As Directed., Disp: 1 each, Rfl: 0    Continuous Blood Gluc Sensor (Dexcom G6 Sensor), Every 10 (Ten) Days., Disp: 9 each, Rfl: 3    Continuous Blood Gluc Transmit (Dexcom G6 Transmitter) misc, 1 each Take As Directed., Disp: 1 each, Rfl: 0    glucose blood (True Metrix Blood Glucose Test) test strip, USE AS DIRECTED, Disp: 50 each, Rfl: 3    ibuprofen (ADVIL,MOTRIN) 200 MG tablet, Take 3 tablets by mouth Every 6 (Six) Hours As Needed for Moderate Pain ., Disp: 500 tablet, Rfl: 3    insulin aspart (NovoLOG FlexPen) 100 UNIT/ML solution pen-injector sc pen, INJECT 4 UNITS W/ BRKFAST & LUNCH & 5 UNITS W/ SUPPER. MAY USE MAX 12 UNITS PER SSI WITH  MEALS, Disp: 15 mL, Rfl: 0    Insulin Glargine (Lantus SoloStar) 100 UNIT/ML injection pen, Inject 20 Units under the skin into the appropriate area as directed Every Night. (Patient taking differently: Inject 12 Units under the skin into the appropriate area as directed Daily.), Disp: 15 mL, Rfl: 5    levothyroxine (SYNTHROID, LEVOTHROID) 175 MCG tablet, TAKE ONE TABLET BY MOUTH EVERY MORNING FOR THYROID, Disp: 90 tablet, Rfl: 3    tamsulosin (FLOMAX) 0.4 MG capsule 24 hr capsule, Take 1 capsule by mouth Every Night., Disp: 90 capsule, Rfl: 3    Unifine Pentips Plus 32G X 4 MM misc, USE AS DIRECTED WITH LANTUS AND NOVOLOG, Disp: 200 each, Rfl: 0    vitamin D (ERGOCALCIFEROL) 1.25 MG (34810 UT) capsule capsule, TAKE ONE CAPSULE BY MOUTH EVERY WEEK ON WEDNESDAY, Disp: 12 capsule, Rfl: 3    Past Medical History:   Diagnosis Date    Cataract     Diabetes mellitus     Falls     Gait abnormality     GERD (gastroesophageal reflux disease)     Hypothyroidism     Muscle spasm     Nicotine dependence     Rhabdomyolysis     Tremor        Past Surgical History:   Procedure Laterality Date    COLONOSCOPY N/A 8/16/2023    Procedure: COLONOSCOPY WITH ANESTHESIA;  Surgeon: Ryley Del Cid DO;  Location: [facility] ENDOSCOPY;  Service: Gastroenterology;  Laterality: N/A;  Pre: Encounter for screening for malignant neoplasm of colon;  Post: Sub optimal prep, polyps;  Dr. Jurado    OTHER SURGICAL HISTORY      cyst on tailbone, left big toe       Social History     Socioeconomic History    Marital status: Single   Tobacco Use    Smoking status: Every Day     Packs/day: 0.75     Years: 40.00     Additional pack years: 0.00     Total pack years: 30.00     Types: Cigarettes     Start date: 1970    Smokeless tobacco: Never    Tobacco comments:     16-17 cigs a day per pt as of 07/22/2021   Vaping Use    Vaping Use: Never used   Substance and Sexual Activity    Alcohol use: Never    Drug use: Yes     Types: Marijuana     Comment: sometimes     "Sexual activity: Defer       Family History   Problem Relation Age of Onset    No Known Problems Mother     Alzheimer's disease Father     Colon cancer Neg Hx     Colon polyps Neg Hx     Esophageal cancer Neg Hx        Objective    Temp 98 °F (36.7 °C)   Ht 182.9 cm (72\")   Wt 78 kg (172 lb)   BMI 23.33 kg/m²     Physical Exam  Vitals reviewed.   Constitutional:       Appearance: Normal appearance.   HENT:      Head: Normocephalic and atraumatic.   Pulmonary:      Effort: Pulmonary effort is normal.   Skin:     Coloration: Skin is not pale.   Neurological:      Mental Status: He is alert.   Psychiatric:         Mood and Affect: Mood normal.         Behavior: Behavior normal.           Assessment and Plan    Diagnoses and all orders for this visit:    1. Kidney stones (Primary)  -     Cancel: POC Urinalysis Dipstick, Multipro  -     XR Abdomen KUB; Future    2. Incomplete bladder emptying    No obvious stone seen on the KUB send no stone episodes since he was last seen.    He was unable to provide a urine specimen today he has a history in the past of incomplete bladder emptying.  Last visit is postvoid residual was 37 mL.  Subjectively he is voiding well on the tamsulosin.    Follow-up 1 year KUB prior.            "

## 2024-01-29 ENCOUNTER — TELEPHONE (OUTPATIENT)
Dept: UROLOGY | Facility: CLINIC | Age: 69
End: 2024-01-29
Payer: MEDICARE

## 2024-01-30 ENCOUNTER — OFFICE VISIT (OUTPATIENT)
Dept: UROLOGY | Facility: CLINIC | Age: 69
End: 2024-01-30
Payer: MEDICARE

## 2024-01-30 ENCOUNTER — HOSPITAL ENCOUNTER (OUTPATIENT)
Dept: GENERAL RADIOLOGY | Facility: HOSPITAL | Age: 69
Discharge: HOME | End: 2024-01-30
Admitting: PHYSICIAN ASSISTANT
Payer: MEDICARE

## 2024-01-30 VITALS — HEIGHT: 72 IN | WEIGHT: 172 LBS | BODY MASS INDEX: 23.3 KG/M2 | TEMPERATURE: 98 F

## 2024-01-30 DIAGNOSIS — R33.9 INCOMPLETE BLADDER EMPTYING: ICD-10-CM

## 2024-01-30 DIAGNOSIS — N20.0 KIDNEY STONES: Primary | ICD-10-CM

## 2024-01-30 DIAGNOSIS — N20.0 KIDNEY STONES: ICD-10-CM

## 2024-01-30 PROCEDURE — 74018 RADEX ABDOMEN 1 VIEW: CPT

## 2024-02-26 ENCOUNTER — TELEPHONE (OUTPATIENT)
Dept: INTERNAL MEDICINE | Facility: CLINIC | Age: 69
End: 2024-02-26
Payer: MEDICARE

## 2024-02-26 NOTE — TELEPHONE ENCOUNTER
Mr Colbert has taken a fall 2 x lately. He stopped Physical Therapy because he was doing well but now thinks he might need to have it again.  [facility] is going to offer a PT program so is wanting to get an order for this.

## 2024-02-28 NOTE — TELEPHONE ENCOUNTER
Caller: EUGENIE MUNGUIA    Relationship: Emergency Contact    Best call back number: [phone]     What is the medical concern/diagnosis: HAVING FALLS    What specialty or service is being requested: PHYSICAL THERAPY    Any additional details: PLEASE CALL BACK WITH THE STATUS OF THIS REFERRAL.

## 2024-02-29 DIAGNOSIS — M62.81 MUSCLE WEAKNESS (GENERALIZED): ICD-10-CM

## 2024-02-29 DIAGNOSIS — M48.061 SPINAL STENOSIS OF LUMBAR REGION, UNSPECIFIED WHETHER NEUROGENIC CLAUDICATION PRESENT: ICD-10-CM

## 2024-02-29 DIAGNOSIS — I69.30 HISTORY OF STROKE WITH RESIDUAL DEFICIT: ICD-10-CM

## 2024-02-29 DIAGNOSIS — R29.6 MULTIPLE FALLS: Primary | ICD-10-CM

## 2024-03-06 ENCOUNTER — TELEPHONE (OUTPATIENT)
Dept: INTERNAL MEDICINE | Facility: CLINIC | Age: 69
End: 2024-03-06
Payer: MEDICARE

## 2024-03-06 DIAGNOSIS — I69.30 HISTORY OF STROKE WITH RESIDUAL DEFICIT: Primary | ICD-10-CM

## 2024-03-06 NOTE — TELEPHONE ENCOUNTER
Caller: EUGENIE MUNGUIA    Relationship: Emergency Contact    Best call back number: [phone]     What is the medical concern/diagnosis: ISSUES WALKING     What specialty or service is being requested: PHYSICAL THERAPY     What is the provider, practice or medical service name: PHYSICAL THERAPY AT [facility]     What is the office location: [facility] [place], [place]     What is the office phone number: [phone], FAX NUMBER [phone]    Any additional details:     PATIENT'S BROTHER STATES [facility] HAS NOT RECEIVED THIS REFERRAL FOR PHYSICAL THERAPY.

## 2024-03-06 NOTE — TELEPHONE ENCOUNTER
Nisha Knight from [facility] called 11:05am wanted to see if Occupational Therapy can be added to the order. It is now [facility] not [facility]. [facility] fax [phone].

## 2024-03-28 ENCOUNTER — OFFICE VISIT (OUTPATIENT)
Dept: INTERNAL MEDICINE | Facility: CLINIC | Age: 69
End: 2024-03-28
Payer: MEDICARE

## 2024-03-28 VITALS
DIASTOLIC BLOOD PRESSURE: 54 MMHG | WEIGHT: 170 LBS | SYSTOLIC BLOOD PRESSURE: 88 MMHG | HEART RATE: 79 BPM | BODY MASS INDEX: 23.03 KG/M2 | HEIGHT: 72 IN | OXYGEN SATURATION: 96 % | TEMPERATURE: 97.8 F

## 2024-03-28 DIAGNOSIS — Z23 ENCOUNTER FOR IMMUNIZATION: ICD-10-CM

## 2024-03-28 DIAGNOSIS — E55.9 VITAMIN D DEFICIENCY: ICD-10-CM

## 2024-03-28 DIAGNOSIS — Z91.89 PNEUMOCOCCAL VACCINATION INDICATED: ICD-10-CM

## 2024-03-28 DIAGNOSIS — I95.9 HYPOTENSION, UNSPECIFIED HYPOTENSION TYPE: ICD-10-CM

## 2024-03-28 DIAGNOSIS — Z79.899 ENCOUNTER FOR LONG-TERM CURRENT USE OF MEDICATION: ICD-10-CM

## 2024-03-28 DIAGNOSIS — Z86.73 HISTORY OF CARDIOEMBOLIC CEREBROVASCULAR ACCIDENT (CVA): ICD-10-CM

## 2024-03-28 DIAGNOSIS — Z87.891 PERSONAL HISTORY OF NICOTINE DEPENDENCE: ICD-10-CM

## 2024-03-28 DIAGNOSIS — E10.65 TYPE 1 DIABETES MELLITUS WITH HYPERGLYCEMIA: ICD-10-CM

## 2024-03-28 DIAGNOSIS — Z00.00 ENCOUNTER FOR SUBSEQUENT ANNUAL WELLNESS VISIT (AWV) IN MEDICARE PATIENT: Primary | ICD-10-CM

## 2024-03-28 DIAGNOSIS — Z12.5 SCREENING PSA (PROSTATE SPECIFIC ANTIGEN): ICD-10-CM

## 2024-03-28 DIAGNOSIS — E03.9 ACQUIRED HYPOTHYROIDISM: ICD-10-CM

## 2024-03-28 NOTE — PROGRESS NOTES
The ABCs of the Annual Wellness Visit  Subsequent Medicare Wellness Visit    Subjective      Hi Colbert is a 68 y.o. male who presents for a Subsequent Medicare Wellness Visit.    The following portions of the patient's history were reviewed and   updated as appropriate: allergies, current medications, past family history, past medical history, past social history, past surgical history, and problem list.    Compared to one year ago, the patient feels his physical   health is better.    Compared to one year ago, the patient feels his mental   health is the same.    Recent Hospitalizations:  He was not admitted to the hospital during the last year.       Current Medical Providers:  Patient Care Team:  Diana Luz APRN as PCP - General (Nurse Practitioner)    Outpatient Medications Prior to Visit   Medication Sig Dispense Refill    acetaminophen (TYLENOL) 325 MG tablet Take 2 tablets by mouth Every 4 (Four) Hours.      aspirin 81 MG EC tablet Take 1 tablet by mouth Daily. 180 tablet 1    Continuous Blood Gluc  (Dexcom G6 ) device 1 each Take As Directed. 1 each 0    Continuous Blood Gluc Sensor (Dexcom G6 Sensor) Every 10 (Ten) Days. 9 each 3    Continuous Blood Gluc Transmit (Dexcom G6 Transmitter) misc 1 each Take As Directed. 1 each 0    glucose blood (True Metrix Blood Glucose Test) test strip USE AS DIRECTED 50 each 3    ibuprofen (ADVIL,MOTRIN) 200 MG tablet Take 3 tablets by mouth Every 6 (Six) Hours As Needed for Moderate Pain . 500 tablet 3    insulin aspart (NovoLOG FlexPen) 100 UNIT/ML solution pen-injector sc pen INJECT 4 UNITS W/ BRKFAST & LUNCH & 5 UNITS W/ SUPPER. MAY USE MAX 12 UNITS PER SSI WITH MEALS 15 mL 0    Insulin Glargine (Lantus SoloStar) 100 UNIT/ML injection pen Inject 20 Units under the skin into the appropriate area as directed Every Night. (Patient taking differently: Inject 12 Units under the skin into the appropriate area as directed Daily.) 15 mL 5     levothyroxine (SYNTHROID, LEVOTHROID) 175 MCG tablet TAKE ONE TABLET BY MOUTH EVERY MORNING FOR THYROID 90 tablet 3    tamsulosin (FLOMAX) 0.4 MG capsule 24 hr capsule Take 1 capsule by mouth Every Night. 90 capsule 3    Unifine Pentips Plus 32G X 4 MM misc USE AS DIRECTED WITH LANTUS AND NOVOLOG 200 each 0    vitamin D (ERGOCALCIFEROL) 1.25 MG (22973 UT) capsule capsule TAKE ONE CAPSULE BY MOUTH EVERY WEEK ON WEDNESDAY 12 capsule 3     No facility-administered medications prior to visit.       No opioid medication identified on active medication list. I have reviewed chart for other potential  high risk medication/s and harmful drug interactions in the elderly.        Aspirin is on active medication list. Aspirin use is indicated based on review of current medical condition/s. Pros and cons of this therapy have been discussed today. Benefits of this medication outweigh potential harm.  Patient has been encouraged to continue taking this medication.  .      Patient Active Problem List   Diagnosis    Amputated toe of left foot    Autonomic neuropathy    Smoker    Diabetic polyneuropathy associated with diabetes mellitus due to underlying condition    Hammer toe    HL (hallux limitus), unspecified laterality    Hyperglycemia    Hypotension    Hypothyroidism    Mild dehydration    Multiple falls    Type 1 diabetes mellitus with hypoglycemia and without coma    Unable to care for self    Diabetic ketoacidosis without coma associated with type 1 diabetes mellitus    UTI (urinary tract infection) due to urinary indwelling catheter    Epiretinal membrane    Ingrown nail    Lumbar spinal stenosis    Metatarsalgia of both feet    Peripheral autonomic neuropathy in disorders classified elsewhere    Type 1 diabetes mellitus with hyperglycemia    Vitamin D deficiency    Osteoporosis    Encounter for screening for malignant neoplasm of colon     Advance Care Planning   Advance Care Planning     Advance Directive is on file.  ACP  "discussion was held with the patient during this visit. Patient has an advance directive in EMR which is still valid.      Objective    Vitals:    03/28/24 1354   BP: (!) 88/54   BP Location: Left arm   Patient Position: Sitting   Cuff Size: Adult   Pulse: 79   Temp: 97.8 °F (36.6 °C)   TempSrc: Temporal   SpO2: 96%   Weight: 77.1 kg (170 lb)   Height: 182.9 cm (72.01\")   PainSc: 0-No pain     Estimated body mass index is 23.05 kg/m² as calculated from the following:    Height as of this encounter: 182.9 cm (72.01\").    Weight as of this encounter: 77.1 kg (170 lb).    BMI is within normal parameters. No other follow-up for BMI required.      Does the patient have evidence of cognitive impairment?   No            HEALTH RISK ASSESSMENT    Smoking Status:  Social History     Tobacco Use   Smoking Status Every Day    Current packs/day: 0.75    Average packs/day: 0.8 packs/day for 54.2 years (40.7 ttl pk-yrs)    Types: Cigarettes    Start date: 1970   Smokeless Tobacco Never   Tobacco Comments    16-17 cigs a day per pt as of 07/22/2021     Alcohol Consumption:  Social History     Substance and Sexual Activity   Alcohol Use Never     Fall Risk Screen:    MAKSIM Fall Risk Assessment has not been completed.    Depression Screening:      3/15/2022     2:12 PM   PHQ-2/PHQ-9 Depression Screening   Little Interest or Pleasure in Doing Things 0-->not at all   Feeling Down, Depressed or Hopeless 0-->not at all   PHQ-9: Brief Depression Severity Measure Score 0       Health Habits and Functional and Cognitive Screening:      3/15/2022     2:12 PM   Functional & Cognitive Status   Do you have difficulty preparing food and eating? No   Do you have difficulty bathing yourself, getting dressed or grooming yourself? No   Do you have difficulty using the toilet? No   Do you have difficulty moving around from place to place? Yes   Do you have trouble with steps or getting out of a bed or a chair? No   Current Diet Well Balanced Diet "   Dental Exam Unknown   Eye Exam Unknown   Exercise (times per week) 0 times per week   Current Exercises Include No Regular Exercise   Do you need help using the phone?  No   Are you deaf or do you have serious difficulty hearing?  No   Do you need help to go to places out of walking distance? No   Do you need help shopping? No   Do you need help preparing meals?  No   Do you need help with housework?  No   Do you need help with laundry? No   Do you need help taking your medications? No   Do you need help managing money? No   Do you ever drive or ride in a car without wearing a seat belt? No   Have you felt unusual stress, anger or loneliness in the last month? No   Who do you live with? Community   If you need help, do you have trouble finding someone available to you? No   Have you been bothered in the last four weeks by sexual problems? No   Do you have difficulty concentrating, remembering or making decisions? No       Age-appropriate Screening Schedule:  Refer to the list below for future screening recommendations based on patient's age, sex and/or medical conditions. Orders for these recommended tests are listed in the plan section. The patient has been provided with a written plan.    Health Maintenance   Topic Date Due    ZOSTER VACCINE (1 of 2) Never done    RSV Vaccine - Adults (1 - 1-dose 60+ series) Never done    TDAP/TD VACCINES (2 - Td or Tdap) 10/26/2022    COVID-19 Vaccine (1 - 2023-24 season) Never done    DIABETIC EYE EXAM  10/01/2023    URINE MICROALBUMIN  03/20/2024    LUNG CANCER SCREENING  04/12/2024    HEMOGLOBIN A1C  06/13/2024    INFLUENZA VACCINE  10/01/2024 (Originally 8/1/2023)    ANNUAL WELLNESS VISIT  03/28/2025    DIABETIC FOOT EXAM  03/28/2025    DXA SCAN  08/08/2025    COLORECTAL CANCER SCREENING  08/16/2028    HEPATITIS C SCREENING  Completed    Pneumococcal Vaccine 65+  Completed    AAA SCREEN (ONE-TIME)  Completed                Physical Exam   Constitutional: He is oriented to  person, place, and time.  Non-toxic appearance. He does not appear ill. No distress.   HENT:   Head: Normocephalic and atraumatic.   Mouth/Throat: Mucous membranes are moist. Oropharynx is clear.   Cardiovascular: Normal rate, regular rhythm, normal heart sounds and normal pulses.   Pulmonary/Chest: Effort normal and breath sounds normal. He has no wheezes. He has no rhonchi. He has no rales.   Abdominal: Soft. Bowel sounds are normal. He exhibits no distension. There is no abdominal tenderness.   Musculoskeletal: Normal range of motion. No swelling or tenderness.      Cervical back: He exhibits no tenderness.   Neurological: He is alert and oriented to person, place, and time.   Skin: Skin is warm and dry. No rash noted. No erythema.   Psychiatric: His behavior is normal. Mood, judgment and thought content normal.   Vitals reviewed.      CMS Preventative Services Quick Reference  Risk Factors Identified During Encounter:    Fall Risk-High or Moderate: Discussed Fall Prevention in the home  Immunizations Discussed/Encouraged: Prevnar 20 (Pneumococcal 20-valent conjugate), Shingrix, and RSV (Respiratory Syncytial Virus)  Inactivity/Sedentary: Patient was advised to exercise at least 150 minutes a week per CDC recommendations.  Tobacco Use/Dependance Risk Hi Colbert  reports that he has been smoking cigarettes. He started smoking about 54 years ago. He has a 40.7 pack-year smoking history. He has never used smokeless tobacco. I have educated him on the risk of diseases from using tobacco products such as cancer, COPD, and heart disease.     I advised him to quit and he is not willing to quit.    I spent 3  minutes counseling the patient.    The above risks/problems have been discussed with the patient.  Pertinent information has been shared with the patient in the After Visit Summary.    Diagnoses and all orders for this visit:    1. Encounter for subsequent annual wellness visit (AWV) in Medicare patient  (Primary)    2. Type 1 diabetes mellitus with hyperglycemia  -     Lipid Panel  -     Urinalysis With Microscopic - Urine, Clean Catch  -     Microalbumin / Creatinine Urine Ratio - Urine, Clean Catch  -     Hemoglobin A1c    3. Encounter for long-term current use of medication  -     Urinalysis With Microscopic - Urine, Clean Catch    4. Vitamin D deficiency  -     Vitamin D,25-Hydroxy    5. Acquired hypothyroidism  -     TSH Rfx On Abnormal To Free T4    6. Screening PSA (prostate specific antigen)  -     PSA Screen    7. History of cardioembolic cerebrovascular accident (CVA)  -     Lipid Panel    8. Hypotension, unspecified hypotension type  -     Comprehensive Metabolic Panel  -     CBC & Differential    9. Pneumococcal vaccination indicated  -     Pneumococcal Conjugate Vaccine 20-Valent (PCV20)    10. Encounter for immunization  -     Pneumococcal Conjugate Vaccine 20-Valent (PCV20)    11. Personal history of nicotine dependence  -     CT Chest Low Dose Wo; Future      Patient presents to the office today for subsequent Medicare wellness exam.  He will have labs drawn today as above.  Will follow-up with these results and make changes to plan of care as necessary.  He does continue to follow with endocrinology for type 1 diabetes mellitus.  He does utilize Dexcom device.  He has a follow-up with endocrinology scheduled on 4/9.  Last hemoglobin A1c in December was stable at 7.6%.    Patient is up-to-date on colonoscopy, last performed in August 2023.  This did show a couple of polyps in the rectum.  Pathology showed hyperplasia.  Recommendations to repeat colonoscopy in 5 years.    PSA will be assessed today with yearly labs.    The patient does keep up-to-date with regular dental and vision exams.  Diabetic foot exam performed today.  Patient has very little feeling with the monofilament in either foot, which is not new for him.    The patient continues to smoke tobacco on a daily basis, typically between 3/4  to 1 pack/day.  He has smoked since the age of 15.  He did have a low-dose chest CT in April 2023 for lung cancer screening which showed a solitary 3 mm noncalcified right upper lobe nodule.  Will repeat scan.  The patient is not interested in quitting smoking at this time.    Patient received PCV 20 in the office today.  He was encouraged to obtain the Shingrix vaccine and RSV vaccine at a pharmacy of his choosing.    Patient with history of osteoporosis.  His last DEXA scan in August 2023 did show osteopenia, but did show some improvement in T-score.  Last femoral neck bone density in 2021 was -2.3, which had improved to -2.1 on latest exam.  He is receiving Prolia injections.    Patient with history of CVA. He is maintained on aspirin and statin therapy.  LDL will be reassessed today to see if this is within goal.    Follow Up:   Next Medicare Wellness visit to be scheduled in 1 year.      An After Visit Summary and PPPS were made available to the patient.

## 2024-04-12 NOTE — TELEPHONE ENCOUNTER
Labs ordered 03/28 to check vitamin D levels, not obtained.  Do you want labs drawn before refill??

## 2024-04-13 RX ORDER — ERGOCALCIFEROL 1.25 MG/1
CAPSULE ORAL
Qty: 4 CAPSULE | Refills: 0 | OUTPATIENT
Start: 2024-04-13

## 2024-04-15 RX ORDER — ERGOCALCIFEROL 1.25 MG/1
CAPSULE ORAL
Qty: 4 CAPSULE | Refills: 0 | OUTPATIENT
Start: 2024-04-15

## 2024-04-16 ENCOUNTER — HOSPITAL ENCOUNTER (OUTPATIENT)
Dept: CT IMAGING | Facility: HOSPITAL | Age: 69
Discharge: HOME | End: 2024-04-16
Admitting: NURSE PRACTITIONER
Payer: MEDICARE

## 2024-04-16 DIAGNOSIS — Z87.891 PERSONAL HISTORY OF NICOTINE DEPENDENCE: ICD-10-CM

## 2024-04-16 PROCEDURE — 71271 CT THORAX LUNG CANCER SCR C-: CPT

## 2024-04-17 LAB
25(OH)D3+25(OH)D2 SERPL-MCNC: 67.3 NG/ML (ref 30–100)
ALBUMIN SERPL-MCNC: 4 G/DL (ref 3.5–5.2)
ALBUMIN/GLOB SERPL: 1.6 G/DL
ALP SERPL-CCNC: 92 U/L (ref 39–117)
ALT SERPL-CCNC: 10 U/L (ref 1–41)
AST SERPL-CCNC: 9 U/L (ref 1–40)
BASOPHILS # BLD AUTO: 0.05 10*3/MM3 (ref 0–0.2)
BASOPHILS NFR BLD AUTO: 0.9 % (ref 0–1.5)
BILIRUB SERPL-MCNC: 0.3 MG/DL (ref 0–1.2)
BUN SERPL-MCNC: 27 MG/DL (ref 8–23)
BUN/CREAT SERPL: 17 (ref 7–25)
CALCIUM SERPL-MCNC: 9.5 MG/DL (ref 8.6–10.5)
CHLORIDE SERPL-SCNC: 106 MMOL/L (ref 98–107)
CHOLEST SERPL-MCNC: 149 MG/DL (ref 0–200)
CO2 SERPL-SCNC: 23.9 MMOL/L (ref 22–29)
CREAT SERPL-MCNC: 1.59 MG/DL (ref 0.76–1.27)
EGFRCR SERPLBLD CKD-EPI 2021: 46.7 ML/MIN/1.73
EOSINOPHIL # BLD AUTO: 0.17 10*3/MM3 (ref 0–0.4)
EOSINOPHIL NFR BLD AUTO: 3 % (ref 0.3–6.2)
ERYTHROCYTE [DISTWIDTH] IN BLOOD BY AUTOMATED COUNT: 11.9 % (ref 12.3–15.4)
GLOBULIN SER CALC-MCNC: 2.5 GM/DL
GLUCOSE SERPL-MCNC: 76 MG/DL (ref 65–99)
HBA1C MFR BLD: 7.9 % (ref 4.8–5.6)
HCT VFR BLD AUTO: 42.4 % (ref 37.5–51)
HDLC SERPL-MCNC: 50 MG/DL (ref 40–60)
HGB BLD-MCNC: 13.8 G/DL (ref 13–17.7)
IMM GRANULOCYTES # BLD AUTO: 0.02 10*3/MM3 (ref 0–0.05)
IMM GRANULOCYTES NFR BLD AUTO: 0.3 % (ref 0–0.5)
LDLC SERPL CALC-MCNC: 84 MG/DL (ref 0–100)
LYMPHOCYTES # BLD AUTO: 1.42 10*3/MM3 (ref 0.7–3.1)
LYMPHOCYTES NFR BLD AUTO: 24.7 % (ref 19.6–45.3)
MCH RBC QN AUTO: 30.7 PG (ref 26.6–33)
MCHC RBC AUTO-ENTMCNC: 32.5 G/DL (ref 31.5–35.7)
MCV RBC AUTO: 94.4 FL (ref 79–97)
MONOCYTES # BLD AUTO: 0.42 10*3/MM3 (ref 0.1–0.9)
MONOCYTES NFR BLD AUTO: 7.3 % (ref 5–12)
NEUTROPHILS # BLD AUTO: 3.68 10*3/MM3 (ref 1.7–7)
NEUTROPHILS NFR BLD AUTO: 63.8 % (ref 42.7–76)
NRBC BLD AUTO-RTO: 0 /100 WBC (ref 0–0.2)
PLATELET # BLD AUTO: 199 10*3/MM3 (ref 140–450)
POTASSIUM SERPL-SCNC: 4.6 MMOL/L (ref 3.5–5.2)
PROT SERPL-MCNC: 6.5 G/DL (ref 6–8.5)
PSA SERPL-MCNC: 0.28 NG/ML (ref 0–4)
RBC # BLD AUTO: 4.49 10*6/MM3 (ref 4.14–5.8)
SODIUM SERPL-SCNC: 143 MMOL/L (ref 136–145)
TRIGL SERPL-MCNC: 78 MG/DL (ref 0–150)
TSH SERPL DL<=0.005 MIU/L-ACNC: 1.48 UIU/ML (ref 0.27–4.2)
VLDLC SERPL CALC-MCNC: 15 MG/DL (ref 5–40)
WBC # BLD AUTO: 5.76 10*3/MM3 (ref 3.4–10.8)

## 2024-04-23 DIAGNOSIS — N17.9 AKI (ACUTE KIDNEY INJURY): Primary | ICD-10-CM

## 2024-06-04 ENCOUNTER — TELEPHONE (OUTPATIENT)
Dept: INTERNAL MEDICINE | Facility: CLINIC | Age: 69
End: 2024-06-04

## 2024-06-04 RX ORDER — ERGOCALCIFEROL 1.25 MG/1
50000 CAPSULE ORAL
Qty: 12 CAPSULE | Refills: 3 | Status: CANCELLED | OUTPATIENT
Start: 2024-06-04

## 2024-06-04 NOTE — TELEPHONE ENCOUNTER
Caller: EUGENIE MUNGUIA    Relationship: Emergency Contact    Best call back number: [phone]    What is the best time to reach you: SOON PLEASE     Who are you requesting to speak with (clinical staff, provider,  specific staff member): PROVIDER OR CLINICAL STAFF         What was the call regarding: PATIENTS BROTHER REQUESTING A CALL BACK TO DISCUSS WHETHER OR NOT PATIENT NEEDS TO STILL BE TAKING THE VITAMIN D  IF SO PATIENT WILL NEED A NEW PRESCRIPTION SENT TO THE PHARMACY     Is it okay if the provider responds through MyChart: PREFERS A CALL BACK

## 2024-06-05 DIAGNOSIS — R35.0 URINE FREQUENCY: ICD-10-CM

## 2024-06-05 RX ORDER — TAMSULOSIN HYDROCHLORIDE 0.4 MG/1
CAPSULE ORAL
Qty: 28 CAPSULE | Refills: 0 | Status: SHIPPED | OUTPATIENT
Start: 2024-06-05

## 2024-06-27 ENCOUNTER — OFFICE VISIT (OUTPATIENT)
Dept: INTERNAL MEDICINE | Facility: CLINIC | Age: 69
End: 2024-06-27
Payer: MEDICARE

## 2024-06-27 VITALS
SYSTOLIC BLOOD PRESSURE: 90 MMHG | WEIGHT: 172 LBS | HEIGHT: 72 IN | HEART RATE: 77 BPM | DIASTOLIC BLOOD PRESSURE: 52 MMHG | OXYGEN SATURATION: 95 % | BODY MASS INDEX: 23.3 KG/M2 | TEMPERATURE: 97.8 F

## 2024-06-27 DIAGNOSIS — E10.3393 TYPE 1 DIABETES MELLITUS WITH MODERATE NONPROLIFERATIVE RETINOPATHY OF BOTH EYES WITHOUT MACULAR EDEMA: ICD-10-CM

## 2024-06-27 DIAGNOSIS — I95.0 IDIOPATHIC HYPOTENSION: Primary | ICD-10-CM

## 2024-06-27 DIAGNOSIS — R29.6 MULTIPLE FALLS: ICD-10-CM

## 2024-06-27 DIAGNOSIS — I69.998 WEAKNESS AS LATE EFFECT OF CEREBROVASCULAR ACCIDENT (CVA): ICD-10-CM

## 2024-06-27 DIAGNOSIS — R53.1 WEAKNESS AS LATE EFFECT OF CEREBROVASCULAR ACCIDENT (CVA): ICD-10-CM

## 2024-06-27 PROCEDURE — 3051F HG A1C>EQUAL 7.0%<8.0%: CPT | Performed by: NURSE PRACTITIONER

## 2024-06-27 PROCEDURE — 99214 OFFICE O/P EST MOD 30 MIN: CPT | Performed by: NURSE PRACTITIONER

## 2024-06-27 PROCEDURE — 1126F AMNT PAIN NOTED NONE PRSNT: CPT | Performed by: NURSE PRACTITIONER

## 2024-06-27 PROCEDURE — 1159F MED LIST DOCD IN RCRD: CPT | Performed by: NURSE PRACTITIONER

## 2024-06-27 PROCEDURE — 1160F RVW MEDS BY RX/DR IN RCRD: CPT | Performed by: NURSE PRACTITIONER

## 2024-06-27 RX ORDER — INSULIN ASPART 100 [IU]/ML
INJECTION, SOLUTION INTRAVENOUS; SUBCUTANEOUS
Start: 2024-06-27

## 2024-06-27 RX ORDER — INSULIN GLARGINE 100 [IU]/ML
10 INJECTION, SOLUTION SUBCUTANEOUS DAILY
Start: 2024-06-27

## 2024-06-28 LAB
BUN SERPL-MCNC: 27 MG/DL (ref 8–23)
BUN/CREAT SERPL: 19.3 (ref 7–25)
CALCIUM SERPL-MCNC: 9.4 MG/DL (ref 8.6–10.5)
CHLORIDE SERPL-SCNC: 103 MMOL/L (ref 98–107)
CO2 SERPL-SCNC: 24.3 MMOL/L (ref 22–29)
CREAT SERPL-MCNC: 1.4 MG/DL (ref 0.76–1.27)
EGFRCR SERPLBLD CKD-EPI 2021: 54.4 ML/MIN/1.73
GLUCOSE SERPL-MCNC: 191 MG/DL (ref 65–99)
POTASSIUM SERPL-SCNC: 5.1 MMOL/L (ref 3.5–5.2)
SODIUM SERPL-SCNC: 140 MMOL/L (ref 136–145)

## 2024-06-28 NOTE — PROGRESS NOTES
Kidney function has slightly worsened from the last time that it was checked but overall I think this is fairly stable and probably near what his baseline is.  He needs to continue to try to increase his water intake, goal of 64 ounces per day.  Also avoid any NSAIDs-ibuprofen, Aleve.  Tylenol is okay to take if he needs it

## 2024-07-03 RX ORDER — ERGOCALCIFEROL 1.25 MG/1
50000 CAPSULE ORAL
Qty: 12 CAPSULE | Refills: 3 | Status: SHIPPED | OUTPATIENT
Start: 2024-07-03

## 2024-07-07 PROBLEM — I69.998 WEAKNESS AS LATE EFFECT OF CEREBROVASCULAR ACCIDENT (CVA): Status: ACTIVE | Noted: 2024-07-07

## 2024-07-07 PROBLEM — R53.1 WEAKNESS AS LATE EFFECT OF CEREBROVASCULAR ACCIDENT (CVA): Status: ACTIVE | Noted: 2024-07-07

## 2024-07-07 NOTE — PROGRESS NOTES
Subjective     Chief Complaint:  Falls     HPI:  Patient presents to the office today for a follow-up.  He does indicate problems with ongoing weakness and indicates he had a 10-day period where he had 3 falls.  He and his brother do request an additional course of physical therapy.  Please see assessment and plan below.    Patient's PMR from outside medical facility reviewed and noted.    Past Medical History:   Past Medical History:   Diagnosis Date    Cataract     Diabetes mellitus     Falls     Gait abnormality     GERD (gastroesophageal reflux disease)     Hypothyroidism     Muscle spasm     Nicotine dependence     Rhabdomyolysis     Tremor      Past Surgical History:  Past Surgical History:   Procedure Laterality Date    COLONOSCOPY N/A 8/16/2023    Procedure: COLONOSCOPY WITH ANESTHESIA;  Surgeon: Ryley Del Cid DO;  Location: [facility] ENDOSCOPY;  Service: Gastroenterology;  Laterality: N/A;  Pre: Encounter for screening for malignant neoplasm of colon;  Post: Sub optimal prep, polyps;  Dr. Jurado    OTHER SURGICAL HISTORY      cyst on tailbone, left big toe     Social History:  reports that he has been smoking cigarettes. He started smoking about 54 years ago. He has a 40.9 pack-year smoking history. He has never used smokeless tobacco. He reports current drug use. Drug: Marijuana. He reports that he does not drink alcohol.    Family History: family history includes Alzheimer's disease in his father; No Known Problems in his mother.      Allergies:  No Known Allergies  Medications:  Prior to Admission medications    Medication Sig Start Date End Date Taking? Authorizing Provider   acetaminophen (TYLENOL) 325 MG tablet Take 2 tablets by mouth Every 4 (Four) Hours.   Yes Provider, MD Deanna   aspirin 81 MG EC tablet Take 1 tablet by mouth Daily. 12/1/22  Yes Diana Luz APRN   Continuous Blood Gluc  (Dexcom G6 ) device 1 each Take As Directed. 2/28/22  Yes Kesha Del Cid  "ELANA Stokes   Continuous Blood Gluc Sensor (Dexcom G6 Sensor) Every 10 (Ten) Days. 7/23/21  Yes Raissa Olivera APRN   Continuous Blood Gluc Transmit (Dexcom G6 Transmitter) misc 1 each Take As Directed. 2/28/22  Yes Kesha Del Cid APRN   glucose blood (True Metrix Blood Glucose Test) test strip USE AS DIRECTED 9/25/23  Yes Tee Gonsales APRN   ibuprofen (ADVIL,MOTRIN) 200 MG tablet Take 3 tablets by mouth Every 6 (Six) Hours As Needed for Moderate Pain . 7/23/21  Yes Raissa Olivera APRN   insulin aspart (NovoLOG FlexPen) 100 UNIT/ML solution pen-injector sc pen INJECT 6 UNITS W/ BRKFAST,4 UNITS W/ LUNCH & 5 UNITS W/ SUPPER. MAY USE MAX 12 UNITS PER SSI WITH MEALS 6/27/24  Yes Diana Luz APRN   Insulin Glargine (Lantus SoloStar) 100 UNIT/ML injection pen Inject 10 Units under the skin into the appropriate area as directed Daily. 6/27/24  Yes Diana Luz APRN   levothyroxine (SYNTHROID, LEVOTHROID) 175 MCG tablet TAKE ONE TABLET BY MOUTH EVERY MORNING FOR THYROID 12/20/23  Yes Diana Luz APRN   tamsulosin (FLOMAX) 0.4 MG capsule 24 hr capsule TAKE ONE CAPSULE BY MOUTH AT BEDTIME FOR BPH 6/5/24  Yes Mendoza Alberts PA   Unifine Pentips Plus 32G X 4 MM misc USE AS DIRECTED WITH LANTUS AND NOVOLOG 9/25/23  Yes Tee Gonsales APRN   vitamin D (ERGOCALCIFEROL) 1.25 MG (53146 UT) capsule capsule Take 1 capsule by mouth Every 7 (Seven) Days. On Wednesday 7/3/24   Diana Luz APRN       Objective     Vital Signs: BP 90/52 (BP Location: Left arm, Patient Position: Sitting, Cuff Size: Adult)   Pulse 77   Temp 97.8 °F (36.6 °C) (Temporal)   Ht 182.9 cm (72.01\")   Wt 78 kg (172 lb)   SpO2 95%   BMI 23.32 kg/m²   Physical Exam  Vitals and nursing note reviewed.   Constitutional:       General: He is not in acute distress.     Appearance: He is not ill-appearing or toxic-appearing.      Comments: Sitting up in wheelchair   HENT:      Head: " Normocephalic and atraumatic.      Mouth/Throat:      Mouth: Mucous membranes are moist.      Pharynx: Oropharynx is clear.   Cardiovascular:      Rate and Rhythm: Normal rate and regular rhythm.      Pulses: Normal pulses.      Heart sounds: Normal heart sounds.   Pulmonary:      Effort: Pulmonary effort is normal.      Breath sounds: No wheezing, rhonchi or rales.   Abdominal:      General: Bowel sounds are normal. There is no distension.      Palpations: Abdomen is soft.      Tenderness: There is no abdominal tenderness.   Musculoskeletal:         General: No swelling or tenderness. Normal range of motion.      Cervical back: Normal range of motion and neck supple. No tenderness.   Skin:     General: Skin is warm and dry.      Coloration: Skin is pale.      Findings: No erythema or rash.   Neurological:      General: No focal deficit present.      Mental Status: He is alert and oriented to person, place, and time.      Comments: Generalized weakness noted, more so on left side as residual from previous CVA. Slower speech is baseline   Psychiatric:         Mood and Affect: Mood normal.         Behavior: Behavior normal.         Thought Content: Thought content normal.         Judgment: Judgment normal.       BMI is within normal parameters. No other follow-up for BMI required.    Results Reviewed:  Reviewed last office visit with endocrinology 4/9/2024.    Assessment / Plan     Assessment/Plan:  Diagnoses and all orders for this visit:    1. Idiopathic hypotension (Primary)  -     Cancel: Basic metabolic panel; Future  -     Basic metabolic panel    2. 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  -     insulin aspart (NovoLOG FlexPen) 100 UNIT/ML solution pen-injector sc pen; INJECT 6 UNITS W/ BRKFAST,4 UNITS W/  LUNCH & 5 UNITS W/ SUPPER. MAY USE MAX 12 UNITS PER SSI WITH MEALS    3. Multiple falls  -     Ambulatory Referral to Physical Therapy    4. Weakness as late effect of cerebrovascular accident (CVA)  -     Ambulatory Referral to Physical Therapy       Patient presents to the office today for a follow-up.  His brother is present with him as usual.  At the end of February, his brother had called and had asked for physical therapy to be ordered as the patient was exhibiting weakness and falls.  The patient did complete this as directed and felt that it may have helped a little.  He and his brother requested an additional course of physical therapy today as he continues to have falls.  He had a 10-day.  Where he indicated he fell 3 times.  He states he did have some soreness to the right knee and shoulder following 1 of these falls but this is not bothering him now and he does not feel that he has sustained any injuries otherwise.  He denies ever hitting his head or losing consciousness during any of these falls.  His last fall was several days ago when his feet got tangled up in a blanket when he got up to go to the bathroom during the night.  He does not necessarily feel that he has had any dizziness to contribute to his falls, feels that these are all mechanical related.  He does have a history of CVA with residual left-sided weakness.  He and I have discussed before that I believe some of his issue with falling, lack of mobility and ambulation are motivation related which he does not necessarily disagree with.  We discussed trying to take physical therapy more seriously and trying to complete some exercises on his own time as well.    Last hemoglobin A1c assessed 4/16/2024 was 7.9%.  His last office visit with endocrinology was on 4/9/2024 and he has an upcoming visit on 7/9/2024.  It appears that at his last office visit his Lantus was decreased from 12 units.  He continues on NovoLog before meals, 6 units with  breakfast, 4 units with lunch, 5 units with dinner and a sliding scale as well.  The patient's Dexcom readings were reviewed for the last 14 days.  His average glucose is 190 with being in range 51% of the time.  He has a pattern of daytime highs between 5:45 AM and 11:35 AM.  Will await further recommendations from endocrinology at his upcoming appointment. It would be too early to reassess his A1c at this point.  Estimated A1c from his Dexcom is 7.8%.    Patient's blood pressure continues to run low at baseline, 90/52 today.  He denies any chest pain, shortness of breath, palpitations, dizziness/lightheadedness.  He did have some worsening of renal function noted when labs were assessed in April with GFR of 46.  This had typically been running above 60.  Could be that worsening of diabetes is contributing to worsening of chronic kidney disease but could also be effect of poor renal perfusion from chronic hypotension.  This was rechecked on 5/9 with GFR coming back up to 64.  Will recheck this today.  The patient and I have discussed multiple times that he does need to be drinking more water.  He needs to continue to try to avoid NSAIDs.  Will continue to monitor labs.  Patient denies any urinary symptoms at this time.  He was unable to complete urinalysis and microalbumin/creatinine ratio with his yearly labs in March, so we will need to try to collect these at his next office visit.    Return in about 3 months (around 9/27/2024) for Recheck- A1c. unless patient needs to be seen sooner or acute issues arise.    I have discussed the patient results/orders and and plan/recommendation with them at today's visit.      Diana Luz, ELANA   06/27/2024

## 2024-07-09 ENCOUNTER — TELEPHONE (OUTPATIENT)
Dept: INTERNAL MEDICINE | Facility: CLINIC | Age: 69
End: 2024-07-09

## 2024-07-09 NOTE — TELEPHONE ENCOUNTER
Caller: EUGENIE MUNGUIA    Relationship to patient: Emergency Contact    Best call back number: [phone]    Patient is needing: STATES THAT MEDICATION WAS NOT SENT TO THE CORRECT PHARMACY, NEEDS TO GO TO [facility]AB [facility].

## 2024-07-17 ENCOUNTER — TELEPHONE (OUTPATIENT)
Dept: INTERNAL MEDICINE | Facility: CLINIC | Age: 69
End: 2024-07-17
Payer: MEDICARE

## 2024-07-17 NOTE — TELEPHONE ENCOUNTER
[facility] called to let you (Catherine) know that they reached out to Mr Colbert and he is currently under the care of another PT so they are unable to take him on at this time.

## 2024-07-23 ENCOUNTER — HOSPITAL ENCOUNTER (EMERGENCY)
Facility: HOSPITAL | Age: 69
Discharge: HOME | End: 2024-07-23
Payer: MEDICARE

## 2024-07-23 VITALS
HEART RATE: 80 BPM | BODY MASS INDEX: 23.03 KG/M2 | HEIGHT: 72 IN | WEIGHT: 170 LBS | SYSTOLIC BLOOD PRESSURE: 91 MMHG | TEMPERATURE: 97.8 F | RESPIRATION RATE: 18 BRPM | DIASTOLIC BLOOD PRESSURE: 53 MMHG | OXYGEN SATURATION: 98 %

## 2024-07-23 DIAGNOSIS — Z13.9 ENCOUNTER FOR MEDICAL SCREENING EXAMINATION: Primary | ICD-10-CM

## 2024-07-23 PROCEDURE — 99282 EMERGENCY DEPT VISIT SF MDM: CPT

## 2024-07-23 NOTE — ED PROVIDER NOTES
Subjective   History of Present Illness  Patient is a 68-year-old male who presents emergency department.  Patient resides at [facility] assisted living [facility].  Apparently, the staff noticed the patient was making suicidal comments and was sent here for evaluation.  Patient denies any suicidal or homicidal thoughts at all.  Patient reports that he has a sarcastic person but denies any suicidal or homicidal thoughts.  Patient denies any symptoms whatsoever.  Patient is alert and oriented x 4 on exam.  He is answering all questions appropriately.  Brother is at the bedside.        Review of Systems   All other systems reviewed and are negative.      Past Medical History:   Diagnosis Date    Cataract     Diabetes mellitus     Falls     Gait abnormality     GERD (gastroesophageal reflux disease)     Hypothyroidism     Muscle spasm     Nicotine dependence     Rhabdomyolysis     Tremor        No Known Allergies    Past Surgical History:   Procedure Laterality Date    COLONOSCOPY N/A 8/16/2023    Procedure: COLONOSCOPY WITH ANESTHESIA;  Surgeon: Ryley Del Cid DO;  Location: [facility] ENDOSCOPY;  Service: Gastroenterology;  Laterality: N/A;  Pre: Encounter for screening for malignant neoplasm of colon;  Post: Sub optimal prep, polyps;  Dr. Jurado    OTHER SURGICAL HISTORY      cyst on tailbone, left big toe       Family History   Problem Relation Age of Onset    No Known Problems Mother     Alzheimer's disease Father     Colon cancer Neg Hx     Colon polyps Neg Hx     Esophageal cancer Neg Hx        Social History     Socioeconomic History    Marital status: Single   Tobacco Use    Smoking status: Every Day     Current packs/day: 0.75     Average packs/day: 0.8 packs/day for 54.6 years (40.9 ttl pk-yrs)     Types: Cigarettes     Start date: 1970    Smokeless tobacco: Never    Tobacco comments:     16-17 cigs a day per pt as of 07/22/2021   Vaping Use    Vaping status: Never Used   Substance and Sexual Activity     Alcohol use: Never    Drug use: Yes     Types: Marijuana     Comment: sometimes    Sexual activity: Defer           Objective   Physical Exam  Vitals and nursing note reviewed.   Constitutional:       General: He is not in acute distress.     Appearance: Normal appearance. He is normal weight. He is not ill-appearing or toxic-appearing.   HENT:      Head: Normocephalic.   Cardiovascular:      Rate and Rhythm: Normal rate and regular rhythm.      Pulses: Normal pulses.   Pulmonary:      Effort: Pulmonary effort is normal.      Breath sounds: Normal breath sounds.   Abdominal:      General: Abdomen is flat. Bowel sounds are normal.      Palpations: Abdomen is soft.   Musculoskeletal:         General: Normal range of motion.      Cervical back: Normal range of motion and neck supple.   Skin:     General: Skin is warm and dry.   Neurological:      General: No focal deficit present.      Mental Status: He is alert and oriented to person, place, and time. Mental status is at baseline.   Psychiatric:         Mood and Affect: Mood normal.         Behavior: Behavior normal.         Thought Content: Thought content normal.         Judgment: Judgment normal.         Procedures           ED Course                                             Medical Decision Making  Patient is a 68-year-old male who presents emergency department.  Patient resides at [facility].  Apparently, the staff noticed the patient was making suicidal comments and was sent here for evaluation.  Patient denies any suicidal or homicidal thoughts at all.  Patient reports that he has a sarcastic person but denies any suicidal or homicidal thoughts.  Patient denies any symptoms whatsoever.  Patient is alert and oriented x 4 on exam.  He is answering all questions appropriately.  Brother is at the bedside.    Differential diagnoses include encounter for medical screening, psychiatric evaluation, other. Patient has denied suicidal or  homicidal thoughts numerous times to myself and nursing staff. He denies any symptoms at all and feels at his baseline. He is alert and oriented x4 on exam. Given him continuing to deny suicidal thoughts, homicidal thoughts, or any symptoms at all, we will discharge patient from the ER. He was advised to return to ER for any new or concerning symptoms or concerns. Patient verbalized understanding. Discharged in stable condition.    Problems Addressed:  Encounter for medical screening examination: acute illness or injury    Amount and/or Complexity of Data Reviewed  Labs: ordered.  Radiology: ordered.        Final diagnoses:   Encounter for medical screening examination       ED Disposition  ED Disposition       ED Disposition   Discharge    Condition   Stable    Comment   --               JONATHAN Petersen,   [address] [place] [address] Dr. Ross DILLARD [id number]  [phone]    Schedule an appointment as soon as possible for a visit in 1 day      [facility] EMERGENCY DEPARTMENT  [address] [place] [id number] [phone]  Go to   If symptoms worsen         Medication List      No changes were made to your prescriptions during this visit.            Kiley Sheriff, APRN  07/23/24 1546

## 2024-08-01 DIAGNOSIS — R35.0 URINE FREQUENCY: ICD-10-CM

## 2024-08-01 RX ORDER — TAMSULOSIN HYDROCHLORIDE 0.4 MG/1
CAPSULE ORAL
Qty: 28 CAPSULE | Refills: 0 | Status: SHIPPED | OUTPATIENT
Start: 2024-08-01

## 2024-08-12 ENCOUNTER — TELEPHONE (OUTPATIENT)
Dept: INTERNAL MEDICINE | Facility: CLINIC | Age: 69
End: 2024-08-12
Payer: MEDICARE

## 2024-08-12 DIAGNOSIS — E08.42 DIABETIC POLYNEUROPATHY ASSOCIATED WITH DIABETES MELLITUS DUE TO UNDERLYING CONDITION: Primary | ICD-10-CM

## 2024-09-06 NOTE — PROGRESS NOTES
[facility] - PODIATRY    Today's Date: 09/10/2024     Patient Name: Hi Colbert  MRN: [id number]  CSN: [id number]  PCP: Hossein Mcnamara MD  Referring Provider: Diana Luz APRN    SUBJECTIVE     Chief Complaint   Patient presents with    Establish Care     Hossein Mcnamara MD 06/27/2024 Diabetic polyneuropathy associated with diabetes mellitus due to underlying condition-pt states he is here today for diabetic nail/foot care-pt denies pain    Diabetes     266 Mg/dl BG     HPI: Hi Colbert, a 68 y.o.male, comes to clinic as a(n) new patient presenting for diabetic foot exam and complaining of toenail/callus issues. Patient has h/o DM1, Gait Difficulty, GERD, Hypothyroid, Tobacco Use, Left Hallux Amputation . Patient is IDDM with last stated BG level of 266mg/dl. Admits to numbness in feet. Denies open wounds or sores. Notes that he had his left hallux amputated due to bone infection. Area has remained healed. Notes his toenails are long, thick and discolored. He is unable to care for them himself. Resides at [facility]. Has a caregiver. Denies pain. Denies previous treatment. Denies any constitutional symptoms. No other pedal complaints at this time.    Past Medical History:   Diagnosis Date    Cataract     Diabetes mellitus     Falls     Gait abnormality     GERD (gastroesophageal reflux disease)     Hypothyroidism     Muscle spasm     Nicotine dependence     Rhabdomyolysis     Tremor      Past Surgical History:   Procedure Laterality Date    AMPUTATION DIGIT Left     great toe    COLONOSCOPY N/A 08/16/2023    Procedure: COLONOSCOPY WITH ANESTHESIA;  Surgeon: Ryley Del Cid DO;  Location: [facility] ENDOSCOPY;  Service: Gastroenterology;  Laterality: N/A;  Pre: Encounter for screening for malignant neoplasm of colon;  Post: Sub optimal prep, polyps;  Dr. Jurado    OTHER SURGICAL HISTORY      cyst on tailbone, left big toe     Family History   Problem Relation Age of Onset     No Known Problems Mother     Alzheimer's disease Father     Colon cancer Neg Hx     Colon polyps Neg Hx     Esophageal cancer Neg Hx      Social History     Socioeconomic History    Marital status: Single   Tobacco Use    Smoking status: Every Day     Current packs/day: 0.75     Average packs/day: 0.8 packs/day for 54.7 years (41.0 ttl pk-yrs)     Types: Cigarettes     Start date: 1970     Passive exposure: Current    Smokeless tobacco: Never    Tobacco comments:     16-17 cigs a day per pt as of 07/22/2021   Vaping Use    Vaping status: Never Used   Substance and Sexual Activity    Alcohol use: Never    Drug use: Yes     Types: Marijuana     Comment: sometimes    Sexual activity: Defer     No Known Allergies  Current Outpatient Medications   Medication Sig Dispense Refill    acetaminophen (TYLENOL) 325 MG tablet Take 2 tablets by mouth Every 4 (Four) Hours.      aspirin 81 MG EC tablet Take 1 tablet by mouth Daily. 180 tablet 1    CALCIUM MAGNESIUM ZINC PO Take  by mouth. OTC      Continuous Blood Gluc  (Dexcom G6 ) device 1 each Take As Directed. 1 each 0    Continuous Blood Gluc Sensor (Dexcom G6 Sensor) Every 10 (Ten) Days. 9 each 3    Continuous Blood Gluc Transmit (Dexcom G6 Transmitter) misc 1 each Take As Directed. 1 each 0    glucose blood (True Metrix Blood Glucose Test) test strip USE AS DIRECTED 50 each 3    ibuprofen (ADVIL,MOTRIN) 200 MG tablet Take 3 tablets by mouth Every 6 (Six) Hours As Needed for Moderate Pain . 500 tablet 3    insulin aspart (NovoLOG FlexPen) 100 UNIT/ML solution pen-injector sc pen INJECT 6 UNITS W/ BRKFAST,4 UNITS W/ LUNCH & 5 UNITS W/ SUPPER. MAY USE MAX 12 UNITS PER SSI WITH MEALS      Insulin Glargine (Lantus SoloStar) 100 UNIT/ML injection pen Inject 10 Units under the skin into the appropriate area as directed Daily.      levothyroxine (SYNTHROID, LEVOTHROID) 175 MCG tablet TAKE ONE TABLET BY MOUTH EVERY MORNING FOR THYROID 90 tablet 3    tamsulosin  (FLOMAX) 0.4 MG capsule 24 hr capsule TAKE ONE CAPSULE BY MOUTH AT BEDTIME FOR BPH 28 capsule 0    Unifine Pentips Plus 32G X 4 MM misc USE AS DIRECTED WITH LANTUS AND NOVOLOG 200 each 0    vitamin D (ERGOCALCIFEROL) 1.25 MG (81446 UT) capsule capsule Take 1 capsule by mouth Every 7 (Seven) Days. On Wednesday 12 capsule 3     No current facility-administered medications for this visit.     Review of Systems   Constitutional:  Negative for chills and fever.   HENT:  Negative for congestion.    Respiratory:  Negative for shortness of breath.    Cardiovascular:  Negative for chest pain and leg swelling.   Gastrointestinal:  Negative for constipation, diarrhea, nausea and vomiting.   Musculoskeletal:  Positive for arthralgias and gait problem.        Foot pain   Skin:  Negative for wound.   Neurological:  Positive for tremors, weakness and numbness.   Psychiatric/Behavioral:  Negative for agitation.        OBJECTIVE     Vitals:    09/10/24 0917   BP: (!) 82/50   Pulse: 78   SpO2: 96%       PHYSICAL EXAM  GEN:   Accompanied by caretaker.     Foot/Ankle Exam    GENERAL  Appearance:  appears stated age  Orientation:  AAOx3  Affect:  appropriate  Assistance:  wheelchair    VASCULAR     Right Foot Vascularity   Dorsalis pedis:  1+  Posterior tibial:  1+  Skin temperature:  warm  Edema grading:  None  CFT:  4  Pedal hair growth:  Present  Varicosities:  mild varicosities     Left Foot Vascularity   Dorsalis pedis:  1+  Posterior tibial:  1+  Skin temperature:  warm  Edema grading:  None  CFT:  4  Pedal hair growth:  Present  Varicosities:  mild varicosities     NEUROLOGIC     Right Foot Neurologic   Light touch sensation: diminished  Vibratory sensation: diminished  Hot/Cold sensation: diminished  Protective Sensation using Tanana-Eligio Monofilament:   Right Foot Sites Intact: 0.  Sites tested: 10     Left Foot Neurologic   Light touch sensation: diminished  Vibratory sensation: diminished  Hot/Cold sensation:   diminished  Protective Sensation using Winter-Eligio Monofilament:   Left Foot Sites Intact: 0.  Sites tested: 10    MUSCULOSKELETAL     Right Foot Musculoskeletal   Ecchymosis:  none  Tenderness:  none    Arch:  Normal     Left Foot Musculoskeletal    Amputation   Toes amputated: first toe  Ecchymosis:  none  Tenderness:  none  Arch:  Normal  Hammertoe:  Second toe, third toe, fourth toe and fifth toe    MUSCLE STRENGTH     Right Foot Muscle Strength   Foot dorsiflexion:  4+  Foot plantar flexion:  4+  Foot inversion:  4+  Foot eversion:  4+     Left Foot Muscle Strength   Foot dorsiflexion:  4+  Foot plantar flexion:  4+  Foot inversion:  4+  Foot eversion:  4+    RANGE OF MOTION     Right Foot Range of Motion   Foot and ankle ROM within normal limits       Left Foot Range of Motion   Foot and ankle ROM within normal limits      DERMATOLOGIC      Right Foot Dermatologic   Skin  Positive for corn.   Nails  1.  Positive for elongated, onychomycosis, abnormal thickness and subungual debris.  2.  Positive for elongated, onychomycosis, abnormal thickness and subungual debris.  3.  Positive for elongated, onychomycosis, abnormal thickness and subungual debris.  4.  Positive for elongated, onychomycosis, abnormal thickness and subungual debris.  5.  Positive for elongated, onychomycosis, abnormal thickness and subungual debris.     Left Foot Dermatologic   Skin  Left foot skin is intact.   Nails  2.  Positive for elongated, onychomycosis, abnormal thickness and subungual debris.  3.  Positive for elongated, onychomycosis, abnormal thickness and subungual debris.  4.  Positive for elongated, onychomycosis, abnormally thick and subungual debris.  5.  Positive for elongated, onychomycosis, abnormally thick and subungual debris.    Image:       RADIOLOGY/NUCLEAR:  No results found.    LABORATORY/CULTURE RESULTS:      PATHOLOGY RESULTS:       ASSESSMENT/PLAN     Diagnoses and all orders for this visit:    1. Onychomycosis  (Primary)    2. Foot callus    3. Diabetic polyneuropathy associated with type 1 diabetes mellitus    4. Encounter for diabetic foot exam    5. Amputated toe of left foot    6. Weakness as late effect of cerebrovascular accident (CVA)    7. Smoker  -     US Ankle / Brachial Indices Extremity Complete; Future    8. Hammer toe of left foot    9. Weak arterial pulse  -     US Ankle / Brachial Indices Extremity Complete; Future      Comprehensive lower extremity examination and evaluation was performed.  Discussed findings and treatment plan including risks, benefits, and treatment options with patient in detail. Patient agreed with treatment plan.  DFE performed  After verbal consent obtained, nail(s) x9 debrided of length and thickness with nail nipper without incidence  After verbal consent obtained, calluses x1 pared utilizing dermal curette and/or scalpel without incidence  Patient may maintain nails and calluses at home utilizing emery board or pumice stone between visits as needed  Reviewed at home diabetic foot care including daily foot checks   Improved BG control needed.   Continue use of assistive devices for ambulatory support.   NEY ordered to evaluate perfusion.  Tobacco cessation needed.  Not ready to quit.  An After Visit Summary was printed and given to the patient at discharge, including (if requested) any available informative/educational handouts regarding diagnosis, treatment, or medications. All questions were answered to patient/family satisfaction. Should symptoms fail to improve or worsen they agree to call or return to clinic or to go to the Emergency Department. Discussed the importance of following up with any needed screening tests/labs/specialist appointments and any requested follow-up recommended by me today. Importance of maintaining follow-up discussed and patient accepts that missed appointments can delay diagnosis and potentially lead to worsening of conditions.  Return in about 3  months (around 12/10/2024) for Follow-up with Podiatry APRN, Schedule Foot Care Clinic., or sooner if acute issues arise.      This document has been electronically signed by Natanael Giron DPM on September 10, 2024 09:45 CDT

## 2024-09-09 ENCOUNTER — TELEPHONE (OUTPATIENT)
Age: 69
End: 2024-09-09
Payer: MEDICARE

## 2024-09-10 ENCOUNTER — OFFICE VISIT (OUTPATIENT)
Age: 69
End: 2024-09-10
Payer: MEDICARE

## 2024-09-10 ENCOUNTER — PATIENT ROUNDING (BHMG ONLY) (OUTPATIENT)
Age: 69
End: 2024-09-10
Payer: MEDICARE

## 2024-09-10 VITALS
WEIGHT: 170 LBS | DIASTOLIC BLOOD PRESSURE: 50 MMHG | OXYGEN SATURATION: 96 % | HEIGHT: 72 IN | BODY MASS INDEX: 23.03 KG/M2 | SYSTOLIC BLOOD PRESSURE: 82 MMHG | HEART RATE: 78 BPM

## 2024-09-10 DIAGNOSIS — F17.200 SMOKER: ICD-10-CM

## 2024-09-10 DIAGNOSIS — R09.89 WEAK ARTERIAL PULSE: ICD-10-CM

## 2024-09-10 DIAGNOSIS — B35.1 ONYCHOMYCOSIS: Primary | ICD-10-CM

## 2024-09-10 DIAGNOSIS — L84 FOOT CALLUS: ICD-10-CM

## 2024-09-10 DIAGNOSIS — R53.1 WEAKNESS AS LATE EFFECT OF CEREBROVASCULAR ACCIDENT (CVA): ICD-10-CM

## 2024-09-10 DIAGNOSIS — M20.42 HAMMER TOE OF LEFT FOOT: ICD-10-CM

## 2024-09-10 DIAGNOSIS — S98.132A AMPUTATED TOE OF LEFT FOOT: ICD-10-CM

## 2024-09-10 DIAGNOSIS — I69.998 WEAKNESS AS LATE EFFECT OF CEREBROVASCULAR ACCIDENT (CVA): ICD-10-CM

## 2024-09-10 DIAGNOSIS — E10.42 DIABETIC POLYNEUROPATHY ASSOCIATED WITH TYPE 1 DIABETES MELLITUS: ICD-10-CM

## 2024-09-10 DIAGNOSIS — E11.9 ENCOUNTER FOR DIABETIC FOOT EXAM: ICD-10-CM

## 2024-09-10 PROCEDURE — 1159F MED LIST DOCD IN RCRD: CPT | Performed by: PODIATRIST

## 2024-09-10 PROCEDURE — 99203 OFFICE O/P NEW LOW 30 MIN: CPT | Performed by: PODIATRIST

## 2024-09-10 PROCEDURE — 11721 DEBRIDE NAIL 6 OR MORE: CPT | Performed by: PODIATRIST

## 2024-09-10 PROCEDURE — 11055 PARING/CUTG B9 HYPRKER LES 1: CPT | Performed by: PODIATRIST

## 2024-09-10 PROCEDURE — 1160F RVW MEDS BY RX/DR IN RCRD: CPT | Performed by: PODIATRIST

## 2024-09-10 NOTE — PROGRESS NOTES
September 10, 2024    Hello, may I speak with Hi Colbert?    My name is Rina    I am  with [facility]  [address]  [facility] [address] [phone].    Before we get started may I verify your date of birth? 1955    I am calling to officially welcome you to our practice and ask about your recent visit. Is this a good time to talk? yes    Tell me about your visit with us. What things went well?  It went great!        We're always looking for ways to make our patients' experiences even better. Do you have recommendations on ways we may improve?  no    Overall were you satisfied with your first visit to our practice? yes       I appreciate you taking the time to speak with me today. Is there anything else I can do for you? no      Thank you, and have a great day.

## 2024-09-26 DIAGNOSIS — R35.0 URINE FREQUENCY: ICD-10-CM

## 2024-09-26 RX ORDER — TAMSULOSIN HYDROCHLORIDE 0.4 MG/1
CAPSULE ORAL
Qty: 28 CAPSULE | Refills: 0 | Status: SHIPPED | OUTPATIENT
Start: 2024-09-26

## 2024-10-30 DIAGNOSIS — R35.0 URINE FREQUENCY: ICD-10-CM

## 2024-10-30 RX ORDER — TAMSULOSIN HYDROCHLORIDE 0.4 MG/1
CAPSULE ORAL
Qty: 28 CAPSULE | Refills: 0 | Status: SHIPPED | OUTPATIENT
Start: 2024-10-30

## 2024-11-07 ENCOUNTER — OFFICE VISIT (OUTPATIENT)
Dept: INTERNAL MEDICINE | Facility: CLINIC | Age: 69
End: 2024-11-07
Payer: MEDICARE

## 2024-11-07 VITALS
BODY MASS INDEX: 23.3 KG/M2 | OXYGEN SATURATION: 96 % | WEIGHT: 172 LBS | HEIGHT: 72 IN | SYSTOLIC BLOOD PRESSURE: 112 MMHG | TEMPERATURE: 97.1 F | HEART RATE: 85 BPM | DIASTOLIC BLOOD PRESSURE: 60 MMHG

## 2024-11-07 DIAGNOSIS — E10.3393 TYPE 1 DIABETES MELLITUS WITH MODERATE NONPROLIFERATIVE RETINOPATHY OF BOTH EYES WITHOUT MACULAR EDEMA: Primary | ICD-10-CM

## 2024-11-07 DIAGNOSIS — N40.1 BPH WITH LOWER URINARY TRACT SYMPTOMS WITHOUT URINARY OBSTRUCTION: ICD-10-CM

## 2024-11-07 DIAGNOSIS — E03.9 ACQUIRED HYPOTHYROIDISM: ICD-10-CM

## 2024-11-07 DIAGNOSIS — Z72.0 TOBACCO ABUSE: ICD-10-CM

## 2024-11-07 DIAGNOSIS — I69.998 WEAKNESS AS LATE EFFECT OF CEREBROVASCULAR ACCIDENT (CVA): ICD-10-CM

## 2024-11-07 DIAGNOSIS — R53.1 WEAKNESS AS LATE EFFECT OF CEREBROVASCULAR ACCIDENT (CVA): ICD-10-CM

## 2024-11-07 DIAGNOSIS — Z86.0102 PERSONAL HISTORY OF HYPERPLASTIC COLON POLYPS: ICD-10-CM

## 2024-11-07 DIAGNOSIS — R29.6 MULTIPLE FALLS: ICD-10-CM

## 2024-11-07 LAB
EXPIRATION DATE: ABNORMAL
HBA1C MFR BLD: 8.2 % (ref 4.5–5.7)
Lab: ABNORMAL

## 2024-11-07 PROCEDURE — 83036 HEMOGLOBIN GLYCOSYLATED A1C: CPT | Performed by: INTERNAL MEDICINE

## 2024-11-07 PROCEDURE — 3052F HG A1C>EQUAL 8.0%<EQUAL 9.0%: CPT | Performed by: INTERNAL MEDICINE

## 2024-11-07 PROCEDURE — 1159F MED LIST DOCD IN RCRD: CPT | Performed by: INTERNAL MEDICINE

## 2024-11-07 PROCEDURE — 1160F RVW MEDS BY RX/DR IN RCRD: CPT | Performed by: INTERNAL MEDICINE

## 2024-11-07 PROCEDURE — 1126F AMNT PAIN NOTED NONE PRSNT: CPT | Performed by: INTERNAL MEDICINE

## 2024-11-07 PROCEDURE — 99214 OFFICE O/P EST MOD 30 MIN: CPT | Performed by: INTERNAL MEDICINE

## 2024-11-07 RX ORDER — INSULIN GLARGINE 100 [IU]/ML
12 INJECTION, SOLUTION SUBCUTANEOUS DAILY
Start: 2024-11-07

## 2024-11-07 NOTE — PROGRESS NOTES
"    Chief Complaint  Follow-up (3 month follow up ), Diabetes (A1C 8.2), and Hypertension    Subjective        Hi Colbert presents to [facility]  Follow-up  Diabetes    Hypertension    See below.     Objective   Vital Signs:  /60 (BP Location: Left arm, Patient Position: Sitting, Cuff Size: Adult)   Pulse 85   Temp 97.1 °F (36.2 °C) (Temporal)   Ht 182.9 cm (72\")   Wt 78 kg (172 lb)   SpO2 96%   BMI 23.33 kg/m²   Estimated body mass index is 23.33 kg/m² as calculated from the following:    Height as of this encounter: 182.9 cm (72\").    Weight as of this encounter: 78 kg (172 lb).     BMI is within normal parameters. No other follow-up for BMI required.    Physical Exam  Constitutional:       Comments: Seen and discussed with his brother.    HENT:      Head: Normocephalic and atraumatic.   Eyes:      Conjunctiva/sclera: Conjunctivae normal.      Pupils: Pupils are equal, round, and reactive to light.   Cardiovascular:      Rate and Rhythm: Normal rate and regular rhythm.      Heart sounds: Normal heart sounds.   Pulmonary:      Effort: Pulmonary effort is normal. No respiratory distress.      Breath sounds: Normal breath sounds.   Musculoskeletal:         General: Swelling (trace) present.   Skin:     General: Skin is warm and dry.      Comments: Chronic stasis and xerosis of the bilateral LE.      Neurological:      General: No focal deficit present.      Mental Status: He is alert and oriented to person, place, and time.      Motor: Weakness (L>R) present.   Psychiatric:         Mood and Affect: Mood normal.         Behavior: Behavior normal.         Thought Content: Thought content normal.         Judgment: Judgment normal.        Result Review :  Labs from July 2024:  1.  CMP normal.  2.  Hemoglobin A1c 8.0.  3.  CBC showed a hemoglobin of 13.2.  Otherwise unremarkable.  4.  Urinalysis unremarkable.  No protein.    Last lipid panel was in April 2024 and showed a " total cholesterol of 149, HDL 50, LDL 84, triglycerides 78.  TSH was appropriate in April 2024.     PSA was normal in April 2024.         Assessment and Plan   Diagnoses and all orders for this visit:    1. 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    2. Multiple falls  -     Ambulatory Referral to Physical Therapy for Evaluation & Treatment    3. Weakness as late effect of cerebrovascular accident (CVA)  -     Ambulatory Referral to Physical Therapy for Evaluation & Treatment    4. Acquired hypothyroidism    5. Tobacco abuse    6. BPH with lower urinary tract symptoms without urinary obstruction    7. Personal history of hyperplastic colon polyps       He has been a patient of ELANA Sweet.  She has taken an opportunity in [place], [place] and left our practice.  He last saw her in June and then subsequently canceled an appointment in September.  He presents today to start following with me.    He leaves at [facility] and spends most of his time in a wheelchair after a prior CVA.      He is a type I diabetic.  Not only does he follow with our office, he also follows with endocrinology in [place].  He sees ELANA Packer.  He last saw her on 10/17.  Hemoglobin A1c is 8.2 after being 8.0 in July.  He takes long-acting insulin as well as correction insulin.  He has a Dexcom 6 and we downloaded the data. Will be scanned. No significant lows or very lows. In range around 60%. Needs to have highs addressed. Increased long acting insulin. Sees Dr. Giron, ELANA Prather with podiatry. Had NEY studies ordered on 9/10. He sees them again on 12/12.     He has sequelae from a prior CVA.  He did some extra PT after an appointment in June. His brother  wants this ordered again. His left leg gives him the most trouble. On ASA, but no statin.      /60 today.  He has had problems with hypotension at times in the past.  He is not on any antihypertensive medications.    TSH appropriate when last checked. Continue levothyroxine.     Smokes daily. He has [place] re[place] in his shirt pocket. No desire to quit.     He sees Mendoza Alberts PA-C with urology. No current problems on Flomax.     PSA was normal in April 2024.    He underwent colonoscopy in August 2023 with Dr. Del Cid; hyperplastic polyp.  Repeat in 5 years, August 2028.     Plan to have him back in 3 months.  Medicare wellness due after 3/28/2025.      Follow Up   Return in about 3 months (around 2/7/2025) for Recheck.  Patient was given instructions and counseling regarding his condition or for health maintenance advice. Please see specific information pulled into the AVS if appropriate.      TAWANNA Petersen DO       Electronically signed by JONATHAN Petersen DO, 11/07/24, 9:37 AM CST.

## 2024-12-09 NOTE — PROGRESS NOTES
[facility] - PODIATRY    Today's Date: 12/12/2024     Patient Name: Hi Colbert  MRN: [id number]  CSN: [id number]  PCP: JONATHAN Petersen DO  Referring Provider: No ref. provider found    SUBJECTIVE     Chief Complaint   Patient presents with    Follow-up     Hossein Mcnamara MD 06/27/2024 Return in about 3 months - pt states feet doing ok- pt denies pain     Diabetes     229mg/dl BG at present      HPI: Hi Colbert, a 69 y.o.male, comes to clinic as a(n) established patient presenting for diabetic foot exam and complaining of toenail/callus issues. Patient has h/o DM1, Gait Difficulty, GERD, Hypothyroid, Tobacco Use, Left Hallux Amputation . Patient is IDDM with last stated BG level of 229mg/dl.  Unsure of last A1c.  Admits to numbness in feet. Denies open wounds or sores. Notes that he had his left hallux amputated due to bone infection. Area has remained healed.  Today patient complains that his toenails are long, thick, discolored and in need of care.  He is unable to care for them himself.  Obtain use to reside at [facility]. Has a caregiver accompanying him today.  Has not went to have NEY testing that Dr. Giron previously ordered.  Denies pain. Denies previous treatment. Denies any constitutional symptoms. No other pedal complaints at this time.    Past Medical History:   Diagnosis Date    Cataract     Diabetes mellitus     Falls     Gait abnormality     GERD (gastroesophageal reflux disease)     Hypothyroidism     Muscle spasm     Nicotine dependence     Rhabdomyolysis     Tremor      Past Surgical History:   Procedure Laterality Date    AMPUTATION DIGIT Left     great toe    COLONOSCOPY N/A 08/16/2023    Procedure: COLONOSCOPY WITH ANESTHESIA;  Surgeon: Ryley Del Cid DO;  Location: [facility] ENDOSCOPY;  Service: Gastroenterology;  Laterality: N/A;  Pre: Encounter for screening for malignant neoplasm of colon;  Post: Sub optimal prep, polyps;  Dr. Jurado    OTHER  SURGICAL HISTORY      cyst on tailbone, left big toe     Family History   Problem Relation Age of Onset    No Known Problems Mother     Alzheimer's disease Father     Colon cancer Neg Hx     Colon polyps Neg Hx     Esophageal cancer Neg Hx      Social History     Socioeconomic History    Marital status: Single   Tobacco Use    Smoking status: Every Day     Current packs/day: 0.75     Average packs/day: 0.8 packs/day for 54.9 years (41.2 ttl pk-yrs)     Types: Cigarettes     Start date: 1970     Passive exposure: Current    Smokeless tobacco: Never    Tobacco comments:     16-17 cigs a day per pt as of 07/22/2021   Vaping Use    Vaping status: Never Used   Substance and Sexual Activity    Alcohol use: Never    Drug use: Yes     Types: Marijuana     Comment: sometimes    Sexual activity: Defer     No Known Allergies  Current Outpatient Medications   Medication Sig Dispense Refill    acetaminophen (TYLENOL) 325 MG tablet Take 2 tablets by mouth Every 4 (Four) Hours.      aspirin 81 MG EC tablet Take 1 tablet by mouth Daily. 180 tablet 1    CALCIUM MAGNESIUM ZINC PO Take  by mouth. OTC      Continuous Blood Gluc  (Dexcom G6 ) device 1 each Take As Directed. 1 each 0    Continuous Blood Gluc Sensor (Dexcom G6 Sensor) Every 10 (Ten) Days. 9 each 3    Continuous Blood Gluc Transmit (Dexcom G6 Transmitter) misc 1 each Take As Directed. 1 each 0    glucose blood (True Metrix Blood Glucose Test) test strip USE AS DIRECTED 50 each 3    ibuprofen (ADVIL,MOTRIN) 200 MG tablet Take 3 tablets by mouth Every 6 (Six) Hours As Needed for Moderate Pain . 500 tablet 3    insulin aspart (NovoLOG FlexPen) 100 UNIT/ML solution pen-injector sc pen INJECT 6 UNITS W/ BRKFAST,4 UNITS W/ LUNCH & 5 UNITS W/ SUPPER. MAY USE MAX 12 UNITS PER SSI WITH MEALS      Insulin Glargine (Lantus SoloStar) 100 UNIT/ML injection pen Inject 12 Units under the skin into the appropriate area as directed Daily.      levothyroxine (SYNTHROID,  LEVOTHROID) 175 MCG tablet TAKE ONE TABLET BY MOUTH EVERY MORNING FOR THYROID 90 tablet 3    tamsulosin (FLOMAX) 0.4 MG capsule 24 hr capsule TAKE ONE CAPSULE BY MOUTH AT BEDTIME FOR BPH 28 capsule 0    Unifine Pentips Plus 32G X 4 MM misc USE AS DIRECTED WITH LANTUS AND NOVOLOG 200 each 0    vitamin D (ERGOCALCIFEROL) 1.25 MG (01746 UT) capsule capsule Take 1 capsule by mouth Every 7 (Seven) Days. On Wednesday 12 capsule 3     No current facility-administered medications for this visit.     Review of Systems   Constitutional:  Negative for chills and fever.   HENT:  Negative for congestion.    Respiratory:  Negative for shortness of breath.    Cardiovascular:  Negative for chest pain and leg swelling.   Gastrointestinal:  Negative for constipation, diarrhea, nausea and vomiting.   Musculoskeletal:  Positive for arthralgias and gait problem.        Foot pain   Skin:  Negative for wound.   Neurological:  Positive for tremors, weakness and numbness.   Psychiatric/Behavioral:  Negative for agitation.        OBJECTIVE     Vitals:    12/12/24 1011   BP: 116/64         PHYSICAL EXAM  GEN:   Accompanied by caretaker.     Foot/Ankle Exam    GENERAL  Appearance:  appears stated age  Orientation:  AAOx3  Affect:  appropriate  Assistance:  wheelchair    VASCULAR     Right Foot Vascularity   Dorsalis pedis:  1+  Posterior tibial:  1+  Skin temperature:  warm  Edema grading:  None  CFT:  4  Pedal hair growth:  Present  Varicosities:  mild varicosities     Left Foot Vascularity   Dorsalis pedis:  1+  Posterior tibial:  1+  Skin temperature:  warm  Edema grading:  None  CFT:  4  Pedal hair growth:  Present  Varicosities:  mild varicosities     NEUROLOGIC     Right Foot Neurologic   Light touch sensation: diminished  Vibratory sensation: diminished  Hot/Cold sensation: diminished  Protective Sensation using Emma-Eligio Monofilament:   Right Foot Sites Intact: 0.  Sites tested: 10     Left Foot Neurologic   Light touch  sensation: diminished  Vibratory sensation: diminished  Hot/Cold sensation:  diminished  Protective Sensation using [place]-Eligio Monofilament:   Left Foot Sites Intact: 0.  Sites tested: 10    MUSCULOSKELETAL     Right Foot Musculoskeletal   Ecchymosis:  none  Tenderness:  none    Arch:  Normal     Left Foot Musculoskeletal    Amputation   Toes amputated: first toe  Ecchymosis:  none  Tenderness:  none  Arch:  Normal  Hammertoe:  Second toe, third toe, fourth toe and fifth toe    MUSCLE STRENGTH     Right Foot Muscle Strength   Foot dorsiflexion:  4+  Foot plantar flexion:  4+  Foot inversion:  4+  Foot eversion:  4+     Left Foot Muscle Strength   Foot dorsiflexion:  4+  Foot plantar flexion:  4+  Foot inversion:  4+  Foot eversion:  4+    RANGE OF MOTION     Right Foot Range of Motion   Foot and ankle ROM within normal limits       Left Foot Range of Motion   Foot and ankle ROM within normal limits      DERMATOLOGIC      Right Foot Dermatologic   Skin  Right foot skin is intact.   Nails  1.  Positive for elongated, onychomycosis, abnormal thickness and subungual debris.  2.  Positive for elongated, onychomycosis, abnormal thickness and subungual debris.  3.  Positive for elongated, onychomycosis, abnormal thickness and subungual debris.  4.  Positive for elongated, onychomycosis, abnormal thickness and subungual debris.  5.  Positive for elongated, onychomycosis, abnormal thickness and subungual debris.     Left Foot Dermatologic   Skin  Left foot skin is intact.   Nails  2.  Positive for elongated, onychomycosis, abnormal thickness and subungual debris.  3.  Positive for elongated, onychomycosis, abnormal thickness and subungual debris.  4.  Positive for elongated, onychomycosis, abnormally thick and subungual debris.  5.  Positive for elongated, onychomycosis, abnormally thick and subungual debris.      RADIOLOGY/NUCLEAR:  No results found.    LABORATORY/CULTURE RESULTS:      PATHOLOGY RESULTS:        ASSESSMENT/PLAN     Diagnoses and all orders for this visit:    1. Onychomycosis (Primary)    2. Diabetic polyneuropathy associated with type 1 diabetes mellitus    3. Amputated toe of left foot    4. Weakness as late effect of cerebrovascular accident (CVA)    5. Smoker    6. Weak arterial pulse        Comprehensive lower extremity examination and evaluation was performed.  Discussed findings and treatment plan including risks, benefits, and treatment options with patient in detail. Patient agreed with treatment plan.  After verbal consent obtained, nail(s) x9 debrided of length and thickness with nail nipper without incidence  After verbal consent obtained, calluses x1 pared utilizing dermal curette and/or scalpel without incidence  Patient may maintain nails and calluses at home utilizing emery board or pumice stone between visits as needed  Reviewed at home diabetic foot care including daily foot checks   Improved BG control needed.   Continue use of assistive devices for ambulatory support.   Instructed patient to have NEY form to evaluate perfusion to BLEs.  Tobacco cessation needed.  Not ready to quit.  10 use of wheelchair for ambulation support.  Follow-up in 3 months.  Encouraged to call sooner with any questions or concerns.    An After Visit Summary was printed and given to the patient at discharge, including (if requested) any available informative/educational handouts regarding diagnosis, treatment, or medications. All questions were answered to patient/family satisfaction. Should symptoms fail to improve or worsen they agree to call or return to clinic or to go to the Emergency Department. Discussed the importance of following up with any needed screening tests/labs/specialist appointments and any requested follow-up recommended by me today. Importance of maintaining follow-up discussed and patient accepts that missed appointments can delay diagnosis and potentially lead to worsening of  conditions.  Return in about 3 months (around 3/12/2025) for Follow-up with APRN, Follow-up in [facility] or sooner if acute issues arise.      This document has been electronically signed by ELANA Parada on December 12, 2024 11:26 CST

## 2024-12-12 ENCOUNTER — OFFICE VISIT (OUTPATIENT)
Age: 69
End: 2024-12-12
Payer: MEDICARE

## 2024-12-12 VITALS
SYSTOLIC BLOOD PRESSURE: 116 MMHG | WEIGHT: 172 LBS | DIASTOLIC BLOOD PRESSURE: 64 MMHG | HEIGHT: 72 IN | BODY MASS INDEX: 23.3 KG/M2

## 2024-12-12 DIAGNOSIS — E10.42 DIABETIC POLYNEUROPATHY ASSOCIATED WITH TYPE 1 DIABETES MELLITUS: ICD-10-CM

## 2024-12-12 DIAGNOSIS — R53.1 WEAKNESS AS LATE EFFECT OF CEREBROVASCULAR ACCIDENT (CVA): ICD-10-CM

## 2024-12-12 DIAGNOSIS — B35.1 ONYCHOMYCOSIS: Primary | ICD-10-CM

## 2024-12-12 DIAGNOSIS — I69.998 WEAKNESS AS LATE EFFECT OF CEREBROVASCULAR ACCIDENT (CVA): ICD-10-CM

## 2024-12-12 DIAGNOSIS — S98.132A AMPUTATED TOE OF LEFT FOOT: ICD-10-CM

## 2024-12-12 DIAGNOSIS — R09.89 WEAK ARTERIAL PULSE: ICD-10-CM

## 2024-12-12 DIAGNOSIS — F17.200 SMOKER: ICD-10-CM

## 2024-12-13 ENCOUNTER — OFFICE VISIT (OUTPATIENT)
Dept: INTERNAL MEDICINE | Facility: CLINIC | Age: 69
End: 2024-12-13
Payer: MEDICARE

## 2024-12-13 VITALS
BODY MASS INDEX: 23.38 KG/M2 | HEIGHT: 72 IN | TEMPERATURE: 97.3 F | HEART RATE: 82 BPM | OXYGEN SATURATION: 96 % | DIASTOLIC BLOOD PRESSURE: 55 MMHG | SYSTOLIC BLOOD PRESSURE: 96 MMHG | WEIGHT: 172.6 LBS

## 2024-12-13 DIAGNOSIS — M67.40 GANGLION CYST: ICD-10-CM

## 2024-12-13 DIAGNOSIS — B07.9 VERRUCA: Primary | ICD-10-CM

## 2024-12-13 PROCEDURE — 1160F RVW MEDS BY RX/DR IN RCRD: CPT

## 2024-12-13 PROCEDURE — 3052F HG A1C>EQUAL 8.0%<EQUAL 9.0%: CPT

## 2024-12-13 PROCEDURE — 1159F MED LIST DOCD IN RCRD: CPT

## 2024-12-13 PROCEDURE — 99213 OFFICE O/P EST LOW 20 MIN: CPT

## 2024-12-13 PROCEDURE — 17110 DESTRUCTION B9 LES UP TO 14: CPT

## 2024-12-13 PROCEDURE — 1126F AMNT PAIN NOTED NONE PRSNT: CPT

## 2024-12-13 NOTE — PROGRESS NOTES
Subjective   Hi Colbert is a 69 y.o. male.   Chief Complaint   Patient presents with    Mass     Noticed about two or three months ago that he had a lump on his outer right hand. Also has a callus on his inside ring finger.       History of Present Illness   History of Present Illness  The patient is a 69-year-old male who presents to the office today with concerns of a lump on his right hand. Notable history includes CVA with baseline weakness greater on the left side than the right. He also has type 1 diabetes.    He reports the presence of a callus on his right hand, which he occasionally grinds down or trims. He first noticed the callus approximately 3 months ago and has observed a slight increase in its size since then. Initially, he mistook it for a boil and considered having it drained. He reports no associated pain. He also mentions that he has developed calluses on his hands from work. He has not experienced any issues with mobility in his pinky finger, although he does have numbness in his hand due to his diabetes. He has been using an electric nail  to manage the callus as he finds it difficult to use fingernail clippers. He has attempted to clip the callus with the fingernail clippers but finds it challenging. He has not noticed any constant friction over the area of the callus. He has been tugging and pulling at the callus, which causes it to protrude slightly more, and then he trims it with the fingernail clippers or . He has not experienced any bleeding from the callus.    He is uncertain about his blood pressure readings but reports no lightheadedness or dizziness. He occasionally feels more fatigued than usual and tends to rest when this occurs. He has difficulty sleeping due to frequent urination, necessitating bathroom visits every 3.5 hours. He typically sleeps for 3 to 4 hours before needing to use the bathroom. He also experiences frequent interruptions during his sleep due  to noise and staff visits. He does not believe he is dehydrated as he frequently sips on iced tea or water. His blood pressure is checked once or twice a year. He has a continuous glucose monitor, and his blood sugar was 235 this morning. He had breakfast earlier today.    He has a history of stroke, which has affected his balance and walking ability. He experienced 7 falls within a 2-week period, which is unusual for him. He is not currently undergoing physical therapy.    Supplemental Information  He has neuropathy in his eye and is on eyedrops every 4 hours. He has calcium deposits in his eyes. He had cataract surgery and a lens was placed. He went back in 3 months and told them it was worse, he had more blurred vision. They had him doing eyedrops 3 times a day. He is going to order a device that fits over the eyeball to help him put in eyedrops on his own.       The following portions of the patient's history were reviewed and updated as appropriate: allergies, current medications, past family history, past medical history, past social history, past surgical history and problem list.    Review of Systems    Objective   Past Medical History:   Diagnosis Date    Cataract     Diabetes mellitus     Falls     Gait abnormality     GERD (gastroesophageal reflux disease)     Hypothyroidism     Muscle spasm     Nicotine dependence     Rhabdomyolysis     Tremor       Past Surgical History:   Procedure Laterality Date    AMPUTATION DIGIT Left     great toe    COLONOSCOPY N/A 08/16/2023    Procedure: COLONOSCOPY WITH ANESTHESIA;  Surgeon: Ryley Del Cid DO;  Location: [facility] ENDOSCOPY;  Service: Gastroenterology;  Laterality: N/A;  Pre: Encounter for screening for malignant neoplasm of colon;  Post: Sub optimal prep, polyps;  Dr. Jurado    OTHER SURGICAL HISTORY      cyst on tailbone, left big toe        Current Outpatient Medications:     acetaminophen (TYLENOL) 325 MG tablet, Take 2 tablets by mouth Every 4 (Four)  "Hours., Disp: , Rfl:     aspirin 81 MG EC tablet, Take 1 tablet by mouth Daily., Disp: 180 tablet, Rfl: 1    CALCIUM MAGNESIUM ZINC PO, Take  by mouth. OTC, Disp: , Rfl:     Continuous Blood Gluc  (Dexcom G6 ) device, 1 each Take As Directed., Disp: 1 each, Rfl: 0    Continuous Blood Gluc Sensor (Dexcom G6 Sensor), Every 10 (Ten) Days., Disp: 9 each, Rfl: 3    Continuous Blood Gluc Transmit (Dexcom G6 Transmitter) misc, 1 each Take As Directed., Disp: 1 each, Rfl: 0    glucose blood (True Metrix Blood Glucose Test) test strip, USE AS DIRECTED, Disp: 50 each, Rfl: 3    ibuprofen (ADVIL,MOTRIN) 200 MG tablet, Take 3 tablets by mouth Every 6 (Six) Hours As Needed for Moderate Pain ., Disp: 500 tablet, Rfl: 3    insulin aspart (NovoLOG FlexPen) 100 UNIT/ML solution pen-injector sc pen, INJECT 6 UNITS W/ BRKFAST,4 UNITS W/ LUNCH & 5 UNITS W/ SUPPER. MAY USE MAX 12 UNITS PER SSI WITH MEALS, Disp: , Rfl:     Insulin Glargine (Lantus SoloStar) 100 UNIT/ML injection pen, Inject 12 Units under the skin into the appropriate area as directed Daily., Disp: , Rfl:     levothyroxine (SYNTHROID, LEVOTHROID) 175 MCG tablet, TAKE ONE TABLET BY MOUTH EVERY MORNING FOR THYROID, Disp: 90 tablet, Rfl: 3    tamsulosin (FLOMAX) 0.4 MG capsule 24 hr capsule, TAKE ONE CAPSULE BY MOUTH AT BEDTIME FOR BPH, Disp: 28 capsule, Rfl: 0    Unifine Pentips Plus 32G X 4 MM misc, USE AS DIRECTED WITH LANTUS AND NOVOLOG, Disp: 200 each, Rfl: 0    vitamin D (ERGOCALCIFEROL) 1.25 MG (94401 UT) capsule capsule, Take 1 capsule by mouth Every 7 (Seven) Days. On Wednesday, Disp: 12 capsule, Rfl: 3      BP 96/55   Pulse 82   Temp 97.3 °F (36.3 °C) (Temporal)   Ht 182.9 cm (72.01\")   Wt 78.3 kg (172 lb 9.6 oz)   SpO2 96%   BMI 23.40 kg/m²      Body mass index is 23.4 kg/m².  BMI is within normal parameters. No other follow-up for BMI required.       Physical Exam  Vitals and nursing note reviewed.   Constitutional:       General: He is not " in acute distress.     Appearance: Normal appearance. He is not ill-appearing, toxic-appearing or diaphoretic.   HENT:      Head: Normocephalic and atraumatic.   Eyes:      Extraocular Movements: Extraocular movements intact.      Conjunctiva/sclera: Conjunctivae normal.      Pupils: Pupils are equal, round, and reactive to light.   Cardiovascular:      Rate and Rhythm: Normal rate and regular rhythm.      Pulses: Normal pulses.      Heart sounds: Normal heart sounds.   Pulmonary:      Effort: Pulmonary effort is normal.      Breath sounds: Normal breath sounds.   Musculoskeletal:      Cervical back: Normal range of motion.   Skin:     Capillary Refill: Capillary refill takes less than 2 seconds.      Findings: Lesion (What appears to be a wart on the plantar aspect of right hand, cryotherapy applied.) present.      Comments: What appears to be a ganglion cyst on dorsal aspect of right hand, no pain, erythema, drainage, abnormal range of motion, peripheral pulses easily palpable, cap refill appropriate.   Neurological:      General: No focal deficit present.      Mental Status: He is alert and oriented to person, place, and time. Mental status is at baseline.   Psychiatric:         Mood and Affect: Mood normal.         Behavior: Behavior normal.         Thought Content: Thought content normal.         Judgment: Judgment normal.               Assessment & Plan   Diagnoses and all orders for this visit:    1. Verruca (Primary)    2. Ganglion cyst    Other orders  -     Cryotherapy, Skin Lesion            Cryotherapy, Skin Lesion    Date/Time: 12/13/2024 6:42 PM    Performed by: Lor Brown APRN  Authorized by: Lor Brown APRN  Preparation: Patient was prepped and draped in the usual sterile fashion.  Local anesthesia used: no    Anesthesia:  Local anesthesia used: no    Sedation:  Patient sedated: no    Patient tolerance: patient tolerated the procedure well with no immediate  complications  Comments: Right hand, plantar aspect            Assessment & Plan  1. Ganglion cyst.  The lump on his right hand is suspected to be a ganglion cyst, given its lack of warmth and the absence of pain. It is not a boil. He was informed that these cysts are benign and not cancerous, but they can grow on tendons, ligaments, and sometimes affect nerves. Draining the cyst is not recommended due to the risk of losing function in the hand. Surgical removal or steroid injection by a hand orthopedic surgeon, Dr. Harden, may be considered if the cyst becomes bothersome. If the cyst continues to grow, causes pain, or affects hand mobility, a referral to Dr. Harden will be made.    2. Wart.  The callus on his right hand appears to have a warty appearance, suggesting it may be a wart rather than a callus. He was advised to avoid picking at the wart until it is ready to come off, which should occur in about a week. He was also advised to try duct tape occlusion therapy, keeping a piece of duct tape on the wart during the day and switching it out every 12 hours.  Cryotherapy will be applied to the wart today. If the wart persists, additional treatments may be necessary.    3. Low blood pressure.  His blood pressure is low at 96/55. He is not on any medication that could cause low blood pressure. One of the more common reasons for low blood pressure in the setting of not being on blood pressure medicine is maybe some dehydration. He was advised to increase his water intake and have his blood pressure checked this afternoon. If the systolic blood pressure is less than 100, he should contact the office.    4. Type 1 diabetes mellitus.  His blood sugar level was noted to be 235, which is slightly elevated. He was encouraged to maintain good hydration and monitor his blood sugar levels closely.    5. Cerebrovascular accident (CVA).  He has a history of CVA with baseline weakness greater on the left side than the right. He  mentioned experiencing multiple falls recently. He was advised to resume physical therapy. A referral to physical therapy will be made once he settles into his new residence at [facility].    Follow-up  The patient will follow up with Dr. Petersen in February 2025.    PROCEDURE  Cryotherapy with liquid nitrogen was performed on the wart today.    Patient or patient representative verbalized consent for the use of Ambient Listening during the visit with  ELANA Perez for chart documentation. 12/13/2024  18:45 CST    Please note that portions of this note were completed with a voice recognition program.     Electronically signed by ELANA Perez, 12/13/24, 18:44 CST.

## 2024-12-19 DIAGNOSIS — R35.0 URINE FREQUENCY: ICD-10-CM

## 2024-12-19 RX ORDER — TAMSULOSIN HYDROCHLORIDE 0.4 MG/1
CAPSULE ORAL
Qty: 28 CAPSULE | Refills: 0 | Status: SHIPPED | OUTPATIENT
Start: 2024-12-19

## 2025-01-07 ENCOUNTER — OFFICE VISIT (OUTPATIENT)
Dept: INTERNAL MEDICINE | Facility: CLINIC | Age: 70
End: 2025-01-07
Payer: MEDICARE

## 2025-01-07 VITALS
DIASTOLIC BLOOD PRESSURE: 68 MMHG | HEART RATE: 81 BPM | BODY MASS INDEX: 23.4 KG/M2 | SYSTOLIC BLOOD PRESSURE: 108 MMHG | HEIGHT: 72 IN | OXYGEN SATURATION: 97 % | TEMPERATURE: 96.4 F

## 2025-01-07 DIAGNOSIS — M70.22 OLECRANON BURSITIS OF LEFT ELBOW: Primary | ICD-10-CM
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
## 2025-01-07 DIAGNOSIS — M67.441 GANGLION CYST OF JOINT OF FINGER OF RIGHT HAND: ICD-10-CM

## 2025-01-07 PROCEDURE — 99214 OFFICE O/P EST MOD 30 MIN: CPT

## 2025-01-07 PROCEDURE — 1159F MED LIST DOCD IN RCRD: CPT

## 2025-01-07 PROCEDURE — 1125F AMNT PAIN NOTED PAIN PRSNT: CPT

## 2025-01-07 PROCEDURE — 1160F RVW MEDS BY RX/DR IN RCRD: CPT

## 2025-01-07 NOTE — PROGRESS NOTES
Subjective   Hi Colbert is a 69 y.o. male.   Chief Complaint   Patient presents with    knots     Painful knots on right hand.     Elbow Injury     Patient fell 2 days ago.  Complaining of fluid on left elbow.            History of Present Illness  The patient is a 69-year-old male presenting to the office today with concerns about a new painful bump on his hand. He was seen personally on 12/13/2024, where a verruca lesion was treated, and a likely ganglion cyst was diagnosed.    He reports the development of a new bump on his right hand by his thumb, which he first noticed approximately 2 days after his last visit. He also mentions a fall that occurred the day before yesterday, during which he sustained an injury to his left elbow. He is unsure if he hit his back or shoulder blade. He does not have any ace wraps at home but possesses elastic stretch bands. He plans to consult with the nurse at [facility] upon his return for further evaluation of his condition. He does not have any ice packs at home. He describes experiencing pain along the side and bottom of his hand, accompanied by numbness. He expresses concern about the potential growth of the bump, as it appeared suddenly overnight (this is concerning right thumb). He inquires about the possibility of the bump extending down to his finger.     He recalls a previous incident where he had a sleeve with a pad, which provided some relief. He does not believe the current issue is causing significant discomfort.    Supplemental Information  He has transitioned from [facility] to [facility] and reports a satisfactory adjustment period. He recounts the circumstances leading to his fall, which was not precipitated by dizziness. He was attempting to retrieve his electric blanket while leaning over his bed, which is positioned against the wall. In doing so, he overextended himself and fell, striking his elbow on the plywood spring box since he did not yet have a  mattress. He managed to rise independently using a pillow for support. He experiences difficulty standing up from a seated position on hard surfaces due to knee pain and requires assistance to stand. Once upright, he can maintain his balance. He also reports poor balance, which limits his mobility.       The following portions of the patient's history were reviewed and updated as appropriate: allergies, current medications, past family history, past medical history, past social history, past surgical history and problem list.    Review of Systems    Objective   Past Medical History:   Diagnosis Date    Cataract     Diabetes mellitus     Falls     Gait abnormality     GERD (gastroesophageal reflux disease)     Hypothyroidism     Muscle spasm     Nicotine dependence     Rhabdomyolysis     Tremor       Past Surgical History:   Procedure Laterality Date    AMPUTATION DIGIT Left     great toe    COLONOSCOPY N/A 08/16/2023    Procedure: COLONOSCOPY WITH ANESTHESIA;  Surgeon: Ryley Del Cid DO;  Location: [facility] ENDOSCOPY;  Service: Gastroenterology;  Laterality: N/A;  Pre: Encounter for screening for malignant neoplasm of colon;  Post: Sub optimal prep, polyps;  Dr. Jurado    OTHER SURGICAL HISTORY      cyst on tailbone, left big toe        Current Outpatient Medications:     acetaminophen (TYLENOL) 325 MG tablet, Take 2 tablets by mouth Every 4 (Four) Hours., Disp: , Rfl:     aspirin 81 MG EC tablet, Take 1 tablet by mouth Daily., Disp: 180 tablet, Rfl: 1    CALCIUM MAGNESIUM ZINC PO, Take  by mouth. OTC, Disp: , Rfl:     Continuous Blood Gluc  (Dexcom G6 ) device, 1 each Take As Directed., Disp: 1 each, Rfl: 0    Continuous Blood Gluc Sensor (Dexcom G6 Sensor), Every 10 (Ten) Days., Disp: 9 each, Rfl: 3    Continuous Blood Gluc Transmit (Dexcom G6 Transmitter) misc, 1 each Take As Directed., Disp: 1 each, Rfl: 0    glucose blood (True Metrix Blood Glucose Test) test strip, USE AS DIRECTED, Disp: 50  "each, Rfl: 3    ibuprofen (ADVIL,MOTRIN) 200 MG tablet, Take 3 tablets by mouth Every 6 (Six) Hours As Needed for Moderate Pain ., Disp: 500 tablet, Rfl: 3    insulin aspart (NovoLOG FlexPen) 100 UNIT/ML solution pen-injector sc pen, INJECT 6 UNITS W/ BRKFAST,4 UNITS W/ LUNCH & 5 UNITS W/ SUPPER. MAY USE MAX 12 UNITS PER SSI WITH MEALS, Disp: , Rfl:     Insulin Glargine (Lantus SoloStar) 100 UNIT/ML injection pen, Inject 12 Units under the skin into the appropriate area as directed Daily., Disp: , Rfl:     levothyroxine (SYNTHROID, LEVOTHROID) 175 MCG tablet, TAKE ONE TABLET BY MOUTH EVERY MORNING FOR THYROID, Disp: 90 tablet, Rfl: 3    tamsulosin (FLOMAX) 0.4 MG capsule 24 hr capsule, TAKE ONE CAPSULE BY MOUTH AT BEDTIME FOR BPH, Disp: 28 capsule, Rfl: 0    Unifine Pentips Plus 32G X 4 MM misc, USE AS DIRECTED WITH LANTUS AND NOVOLOG, Disp: 200 each, Rfl: 0    vitamin D (ERGOCALCIFEROL) 1.25 MG (96936 UT) capsule capsule, Take 1 capsule by mouth Every 7 (Seven) Days. On Wednesday, Disp: 12 capsule, Rfl: 3      /68 (BP Location: Left arm, Patient Position: Sitting, Cuff Size: Adult)   Pulse 81   Temp 96.4 °F (35.8 °C) (Temporal)   Ht 182.9 cm (72.01\")   SpO2 97%   BMI 23.40 kg/m²      Body mass index is 23.4 kg/m².  BMI is within normal parameters. No other follow-up for BMI required.       Physical Exam  Vitals and nursing note reviewed.   Constitutional:       General: He is not in acute distress.     Appearance: Normal appearance. He is not ill-appearing, toxic-appearing or diaphoretic.   HENT:      Head: Normocephalic and atraumatic.   Eyes:      Extraocular Movements: Extraocular movements intact.      Conjunctiva/sclera: Conjunctivae normal.      Pupils: Pupils are equal, round, and reactive to light.   Cardiovascular:      Rate and Rhythm: Normal rate and regular rhythm.      Pulses: Normal pulses.      Heart sounds: Normal heart sounds.   Pulmonary:      Effort: Pulmonary effort is normal.      " Breath sounds: Normal breath sounds.   Musculoskeletal:         General: Swelling and tenderness (left elbow) present.      Left elbow: Swelling, deformity and effusion present. No lacerations. Decreased range of motion (very slight). No tenderness.      Cervical back: Normal range of motion and neck supple.   Skin:     General: Skin is warm and dry.   Neurological:      General: No focal deficit present.      Mental Status: He is alert and oriented to person, place, and time. Mental status is at baseline.      Motor: Weakness present.      Gait: Gait abnormal (in wheelchair).   Psychiatric:         Mood and Affect: Mood normal.         Behavior: Behavior normal.         Thought Content: Thought content normal.         Judgment: Judgment normal.               Assessment & Plan   Diagnoses and all orders for this visit:    1. Olecranon bursitis of left elbow (Primary)    2. Fall, initial encounter    3. Ganglion cyst of joint of finger of right hand                 Assessment & Plan  1. Traumatic olecranon bursitis left elbow.  The condition is likely a result of trauma to the left elbow sustained during recent fall. A compression bandage will be provided for use. He is advised to apply an ice pack or any form of cold compress several times daily over the next 1 to 2 weeks. The condition should gradually improve with this regimen. He is instructed to monitor for signs of infection such as redness, increased pain, warmth, and worsening swelling.  There was no evidence of skin integrity impairment on examination of the left elbow today.  There was no overt warmth, erythema, range of motion was slightly limited, no significant pain reported on passive range of motion.  He should also avoid further trauma to the area. If there is no improvement within a week or if the condition worsens before then, he is to contact the office.     2. Ganglion cyst.  The most common cause of the symptoms is a ganglion cyst, at his  previous appointment noted on the lateral aspect of right hand, now similar appearance to this cyst with a new one also on the proximal joint of the right thumb.. He is advised to monitor the cyst for any changes. If the cyst becomes more bothersome, painful, or affects the functionality of his right hand, a referral to Dr. Harden, a hand specialist, will be considered.  I did offer to go ahead and place referral today however he does decline and would like to just monitor for now.    Patient or patient representative verbalized consent for the use of Ambient Listening during the visit with  ELANA Perez for chart documentation. 1/7/2025  17:45 CST    Please note that portions of this note were completed with a voice recognition program.     Electronically signed by ELANA Perez, 01/07/25, 17:48 CST.

## 2025-01-09 ENCOUNTER — TELEPHONE (OUTPATIENT)
Dept: INTERNAL MEDICINE | Facility: CLINIC | Age: 70
End: 2025-01-09

## 2025-01-09 NOTE — TELEPHONE ENCOUNTER
Caller: EUGENIE MUNGUIA    Relationship to patient: Emergency Contact    Best call back number:   Telephone Information:   Mobile [phone]       PT'S CAREGIVER CALLED STATING PT HAS MOVED AND THAT PT'S HAS A NEW PHARMACY -[facility]- THAT ALL PRESCRIPTIONS WILL NEED TO GO TO FOR NOW ON.   PRESCRIPTIONS WILL NEED TO BE SENT TO [facility] AS WELL AND PHYSICAL THERAPY WILL NEED TO STARTED UP AGAIN.

## 2025-01-13 DIAGNOSIS — E10.3393 TYPE 1 DIABETES MELLITUS WITH MODERATE NONPROLIFERATIVE RETINOPATHY OF BOTH EYES WITHOUT MACULAR EDEMA: ICD-10-CM

## 2025-01-13 DIAGNOSIS — E03.9 ACQUIRED HYPOTHYROIDISM: ICD-10-CM

## 2025-01-13 DIAGNOSIS — R35.0 URINE FREQUENCY: ICD-10-CM

## 2025-01-13 DIAGNOSIS — I69.30 HISTORY OF STROKE WITH RESIDUAL DEFICIT: Primary | ICD-10-CM

## 2025-01-13 RX ORDER — TAMSULOSIN HYDROCHLORIDE 0.4 MG/1
1 CAPSULE ORAL DAILY
Qty: 28 CAPSULE | Refills: 0 | Status: SHIPPED | OUTPATIENT
Start: 2025-01-13

## 2025-01-13 RX ORDER — INSULIN ASPART 100 [IU]/ML
INJECTION, SOLUTION INTRAVENOUS; SUBCUTANEOUS
Start: 2025-01-13

## 2025-01-13 RX ORDER — LEVOTHYROXINE SODIUM 175 UG/1
175 TABLET ORAL
Qty: 90 TABLET | Refills: 3 | Status: SHIPPED | OUTPATIENT
Start: 2025-01-13

## 2025-01-13 RX ORDER — INSULIN GLARGINE 100 [IU]/ML
12 INJECTION, SOLUTION SUBCUTANEOUS DAILY
Start: 2025-01-13

## 2025-01-21 NOTE — PROGRESS NOTES
Subjective    Mr. Colbert is 69 y.o. male    Chief Complaint: Kidney stone    History of Present Illness  Patient here for annual follow-up he has history of kidney stones as well as incomplete bladder emptying.  He continues tamsulosin subjectively has no acute complaints.  No stone episodes since last seen he was complaining of left-sided pain it appears to be centered at the left lower ribs which might have been injured after a recent fall.  KUB was done prior department today shows stable pelvic calcifications but no obvious new stones.        The following portions of the patient's history were reviewed and updated as appropriate: allergies, current medications, past family history, past medical history, past social history, past surgical history and problem list.    Review of Systems   Genitourinary: Negative.          Current Outpatient Medications:     acetaminophen (TYLENOL) 325 MG tablet, Take 2 tablets by mouth Every 4 (Four) Hours., Disp: , Rfl:     aspirin 81 MG EC tablet, Take 1 tablet by mouth Daily., Disp: 180 tablet, Rfl: 1    CALCIUM MAGNESIUM ZINC PO, Take  by mouth. OTC, Disp: , Rfl:     Continuous Blood Gluc  (Dexcom G6 ) device, 1 each Take As Directed., Disp: 1 each, Rfl: 0    Continuous Blood Gluc Sensor (Dexcom G6 Sensor), Every 10 (Ten) Days., Disp: 9 each, Rfl: 3    Continuous Blood Gluc Transmit (Dexcom G6 Transmitter) misc, 1 each Take As Directed., Disp: 1 each, Rfl: 0    glucose blood (True Metrix Blood Glucose Test) test strip, USE AS DIRECTED, Disp: 50 each, Rfl: 3    ibuprofen (ADVIL,MOTRIN) 200 MG tablet, Take 3 tablets by mouth Every 6 (Six) Hours As Needed for Moderate Pain ., Disp: 500 tablet, Rfl: 3    insulin aspart (NovoLOG FlexPen) 100 UNIT/ML solution pen-injector sc pen, INJECT 6 UNITS W/ BRKFAST,4 UNITS W/ LUNCH & 5 UNITS W/ SUPPER. MAY USE MAX 12 UNITS PER SSI WITH MEALS, Disp: , Rfl:     Insulin Glargine (Lantus SoloStar) 100 UNIT/ML injection pen, Inject  12 Units under the skin into the appropriate area as directed Daily., Disp: , Rfl:     levothyroxine (SYNTHROID, LEVOTHROID) 175 MCG tablet, Take 1 tablet by mouth Every Morning., Disp: 90 tablet, Rfl: 3    NON FORMULARY, Gel Eye drop OTC, Disp: , Rfl:     tamsulosin (FLOMAX) 0.4 MG capsule 24 hr capsule, Take 1 capsule by mouth Daily., Disp: 28 capsule, Rfl: 0    Unifine Pentips Plus 32G X 4 MM misc, USE AS DIRECTED WITH LANTUS AND NOVOLOG, Disp: 200 each, Rfl: 0    vitamin D (ERGOCALCIFEROL) 1.25 MG (11468 UT) capsule capsule, Take 1 capsule by mouth Every 7 (Seven) Days. On Wednesday, Disp: 12 capsule, Rfl: 3    Past Medical History:   Diagnosis Date    Cataract     Diabetes mellitus     Falls     Gait abnormality     GERD (gastroesophageal reflux disease)     Hypothyroidism     Muscle spasm     Nicotine dependence     Rhabdomyolysis     Tremor        Past Surgical History:   Procedure Laterality Date    AMPUTATION DIGIT Left     great toe    COLONOSCOPY N/A 08/16/2023    Procedure: COLONOSCOPY WITH ANESTHESIA;  Surgeon: Ryley Del Cid DO;  Location: [facility] ENDOSCOPY;  Service: Gastroenterology;  Laterality: N/A;  Pre: Encounter for screening for malignant neoplasm of colon;  Post: Sub optimal prep, polyps;  Dr. Jurado    OTHER SURGICAL HISTORY      cyst on tailbone, left big toe       Social History     Socioeconomic History    Marital status: Single   Tobacco Use    Smoking status: Every Day     Current packs/day: 0.75     Average packs/day: 0.8 packs/day for 55.1 years (41.3 ttl pk-yrs)     Types: Cigarettes     Start date: 1970     Passive exposure: Current    Smokeless tobacco: Never    Tobacco comments:     16-17 cigs a day per pt as of 07/22/2021   Vaping Use    Vaping status: Never Used   Substance and Sexual Activity    Alcohol use: Never    Drug use: Yes     Types: Marijuana     Comment: sometimes    Sexual activity: Defer       Family History   Problem Relation Age of Onset    No Known Problems  "Mother     Alzheimer's disease Father     Colon cancer Neg Hx     Colon polyps Neg Hx     Esophageal cancer Neg Hx        Objective    Temp 98.4 °F (36.9 °C) (Infrared)   Ht 182.9 cm (72\")   Wt 79 kg (174 lb 3.2 oz)   BMI 23.63 kg/m²     Physical Exam  Vitals reviewed.   Constitutional:       Appearance: Normal appearance.   HENT:      Head: Normocephalic and atraumatic.   Pulmonary:      Effort: Pulmonary effort is normal.   Skin:     Coloration: Skin is not pale.   Neurological:      Mental Status: He is alert.   Psychiatric:         Mood and Affect: Mood normal.         Behavior: Behavior normal.       KUB independent review    A KUB is available for me to review today.  The image is inspected for a bowel gas pattern and the general bone structure of the spine and pelvis. The kidneys are then inspected closely.  Renal outline is noted if identifiable. The kidney, collecting system, and anticipated path of the ureter are examined for calcifications including those in the true pelvis.  This film reveals:    On the right there are no calcificaitons seen in the kidney or the expected course of the ureter. .    On the left there are no calcificaitons seen in the kidney or the expected course of the ureter.     Assessment and Plan    Diagnoses and all orders for this visit:    1. Kidney stones (Primary)  -     Cancel: POC Urinalysis Dipstick, Multipro  -     XR Abdomen KUB    2. Incomplete bladder emptying      Patient had had previous low postvoid residual he will continue tamsulosin subjectively he is doing well from a voiding standpoint.    He has had no stone episodes since last seen KUB shows no obvious kidney stones.      Patient will follow-up in 1 year with KUB on an as-needed basis                "

## 2025-01-29 ENCOUNTER — TELEPHONE (OUTPATIENT)
Dept: UROLOGY | Facility: CLINIC | Age: 70
End: 2025-01-29
Payer: MEDICARE

## 2025-01-30 ENCOUNTER — HOSPITAL ENCOUNTER (OUTPATIENT)
Dept: GENERAL RADIOLOGY | Facility: HOSPITAL | Age: 70
Discharge: HOME | End: 2025-01-30
Admitting: PHYSICIAN ASSISTANT
Payer: MEDICARE

## 2025-01-30 ENCOUNTER — OFFICE VISIT (OUTPATIENT)
Dept: UROLOGY | Facility: CLINIC | Age: 70
End: 2025-01-30
Payer: MEDICARE

## 2025-01-30 VITALS — HEIGHT: 72 IN | BODY MASS INDEX: 23.6 KG/M2 | WEIGHT: 174.2 LBS | TEMPERATURE: 98.4 F

## 2025-01-30 DIAGNOSIS — N20.0 KIDNEY STONES: Primary | ICD-10-CM

## 2025-01-30 DIAGNOSIS — R33.9 INCOMPLETE BLADDER EMPTYING: ICD-10-CM

## 2025-01-30 PROCEDURE — 74018 RADEX ABDOMEN 1 VIEW: CPT

## 2025-02-04 ENCOUNTER — OFFICE VISIT (OUTPATIENT)
Dept: INTERNAL MEDICINE | Facility: CLINIC | Age: 70
End: 2025-02-04
Payer: MEDICARE

## 2025-02-04 VITALS
TEMPERATURE: 96.2 F | DIASTOLIC BLOOD PRESSURE: 68 MMHG | OXYGEN SATURATION: 99 % | SYSTOLIC BLOOD PRESSURE: 110 MMHG | HEIGHT: 72 IN | HEART RATE: 80 BPM | BODY MASS INDEX: 23.63 KG/M2

## 2025-02-04 DIAGNOSIS — I69.30 HISTORY OF STROKE WITH RESIDUAL DEFICIT: ICD-10-CM

## 2025-02-04 DIAGNOSIS — D21.9 FIBROMA: ICD-10-CM

## 2025-02-04 DIAGNOSIS — E10.649 TYPE 1 DIABETES MELLITUS WITH HYPOGLYCEMIA AND WITHOUT COMA: Primary | ICD-10-CM

## 2025-02-04 DIAGNOSIS — E03.9 ACQUIRED HYPOTHYROIDISM: ICD-10-CM

## 2025-02-04 LAB
EXPIRATION DATE: ABNORMAL
HBA1C MFR BLD: 8.1 % (ref 4.5–5.7)
Lab: ABNORMAL

## 2025-02-04 PROCEDURE — 99214 OFFICE O/P EST MOD 30 MIN: CPT | Performed by: INTERNAL MEDICINE

## 2025-02-04 PROCEDURE — 1159F MED LIST DOCD IN RCRD: CPT | Performed by: INTERNAL MEDICINE

## 2025-02-04 PROCEDURE — 1125F AMNT PAIN NOTED PAIN PRSNT: CPT | Performed by: INTERNAL MEDICINE

## 2025-02-04 PROCEDURE — 83036 HEMOGLOBIN GLYCOSYLATED A1C: CPT | Performed by: INTERNAL MEDICINE

## 2025-02-04 PROCEDURE — 1160F RVW MEDS BY RX/DR IN RCRD: CPT | Performed by: INTERNAL MEDICINE

## 2025-02-04 PROCEDURE — 3052F HG A1C>EQUAL 8.0%<EQUAL 9.0%: CPT | Performed by: INTERNAL MEDICINE

## 2025-02-04 NOTE — PROGRESS NOTES
"    Chief Complaint  Follow-up (3 month follow up. Annual due 3/25/2025.), Hypertension, and Diabetes (A1C 8.1)    Subjective        Hi Colbert presents to [facility]  Hypertension    Diabetes      See below.     Objective   Vital Signs:  /68 (BP Location: Left arm, Patient Position: Sitting, Cuff Size: Adult)   Pulse 80   Temp 96.2 °F (35.7 °C) (Temporal)   Ht 182.9 cm (72\")   SpO2 99%   BMI 23.63 kg/m²   Estimated body mass index is 23.63 kg/m² as calculated from the following:    Height as of this encounter: 182.9 cm (72\").    Weight as of 1/30/25: 79 kg (174 lb 3.2 oz).     BMI is within normal parameters. No other follow-up for BMI required.    Physical Exam  Constitutional:       Comments: Seen and discussed with his brother.   HENT:      Head: Normocephalic and atraumatic.   Eyes:      Conjunctiva/sclera: Conjunctivae normal.      Pupils: Pupils are equal, round, and reactive to light.   Cardiovascular:      Rate and Rhythm: Normal rate and regular rhythm.      Heart sounds: Normal heart sounds.   Pulmonary:      Effort: Pulmonary effort is normal. No respiratory distress.      Breath sounds: Normal breath sounds.   Musculoskeletal:         General: No swelling.      Comments: He has what is likely a fibroma near the junction of the fifth metacarpophalangeal joint on the right hand.  Somewhat mobile.  Firm, but also with some possible cystic features.   Skin:     General: Skin is warm and dry.      Findings: No rash.   Neurological:      General: No focal deficit present.      Mental Status: He is alert and oriented to person, place, and time.      Comments: In a wheelchair as usual.   Psychiatric:         Mood and Affect: Mood normal.         Behavior: Behavior normal.         Thought Content: Thought content normal.         Judgment: Judgment normal.        Result Review :  We downloaded his Dexcom data.  This will be scanned in.         Assessment and Plan "   Diagnoses and all orders for this visit:    1. Type 1 diabetes mellitus with hypoglycemia and without coma (Primary)  -     POC Glycated Hemoglobin, Total    2. History of stroke with residual deficit    3. Acquired hypothyroidism    4. Fibroma  -     Ambulatory Referral to Orthopedic Surgery       Seen and discussed with his brother.    He recently moved to [facility] instead of living at [facility].  He likes this facility better.  He is doing therapy there.    Hemoglobin A1c is 8.1.  It was 8.2 in November after being 8.0 in July.  He also has a Dexcom 6.  He sees ELANA Packer with endocrinology on 5/1.  He actually just saw her last week and she made some adjustments to his insulin regimen.    Plan to do fasting labs with his next visit.  His TSH had been acceptable all the way back to March 2022.  He is not fasting today for other labs.    Blood pressure 110/68.  Not presently on any antihypertensive medications.  He has had hypotensive numbers at times in the past.    He brings up an issue with a nodule near the right fifth MCP joint.  This is either a fibroma or a ganglion.  Seems more firm than cystic.  He hits this on his walker and it bothers him.  Plan to send him to Dr. Harden with orthopedic hand.    He is due for his Medicare wellness visit at his next appointment and we will recheck fasting labs at that point in time.      Follow Up   Return in about 3 months (around 5/4/2025) for Annual Medicare Wellness Visit.  Patient was given instructions and counseling regarding his condition or for health maintenance advice. Please see specific information pulled into the AVS if appropriate.      TAWANNA Petersen DO       Electronically signed by JONATHAN Petersen DO, 02/04/25, 9:49 AM CST.

## 2025-02-11 DIAGNOSIS — R35.0 URINE FREQUENCY: ICD-10-CM

## 2025-02-11 RX ORDER — TAMSULOSIN HYDROCHLORIDE 0.4 MG/1
CAPSULE ORAL
Qty: 30 CAPSULE | Refills: 0 | Status: SHIPPED | OUTPATIENT
Start: 2025-02-11

## (undated) DEVICE — Device: Brand: DEFENDO AIR/WATER/SUCTION AND BIOPSY VALVE

## (undated) DEVICE — YANKAUER,BULB TIP WITH VENT: Brand: ARGYLE

## (undated) DEVICE — MASK,OXYGEN,MED CONC,ADLT,7' TUB, UC: Brand: PENDING

## (undated) DEVICE — THE CHANNEL CLEANING BRUSH IS A NYLON FLEXI BRUSH ATTACHED TO A FLEXIBLE PLASTIC SHEATH DESIGNED TO SAFELY REMOVE DEBRIS FROM FLEXIBLE ENDOSCOPES.

## (undated) DEVICE — SENSR O2 OXIMAX FNGR A/ 18IN NONSTR

## (undated) DEVICE — SNAR POLYP CAPTIVATOR MICROHEX 13 240CM

## (undated) DEVICE — THE SINGLE USE ETRAP – POLYP TRAP IS USED FOR SUCTION RETRIEVAL OF ENDOSCOPICALLY REMOVED POLYPS.: Brand: ETRAP